# Patient Record
Sex: MALE | Race: OTHER | HISPANIC OR LATINO
[De-identification: names, ages, dates, MRNs, and addresses within clinical notes are randomized per-mention and may not be internally consistent; named-entity substitution may affect disease eponyms.]

---

## 2017-11-06 ENCOUNTER — APPOINTMENT (OUTPATIENT)
Dept: ENDOCRINOLOGY | Facility: CLINIC | Age: 57
End: 2017-11-06

## 2018-04-16 ENCOUNTER — INPATIENT (INPATIENT)
Facility: HOSPITAL | Age: 58
LOS: 1 days | Discharge: HOME | DRG: 247 | End: 2018-04-18
Attending: INTERNAL MEDICINE | Admitting: INTERNAL MEDICINE
Payer: COMMERCIAL

## 2018-04-16 VITALS
RESPIRATION RATE: 18 BRPM | HEIGHT: 65 IN | OXYGEN SATURATION: 100 % | TEMPERATURE: 99 F | DIASTOLIC BLOOD PRESSURE: 70 MMHG | SYSTOLIC BLOOD PRESSURE: 166 MMHG | WEIGHT: 160.06 LBS | HEART RATE: 73 BPM

## 2018-04-16 DIAGNOSIS — E11.9 TYPE 2 DIABETES MELLITUS WITHOUT COMPLICATIONS: ICD-10-CM

## 2018-04-16 DIAGNOSIS — Z95.1 PRESENCE OF AORTOCORONARY BYPASS GRAFT: Chronic | ICD-10-CM

## 2018-04-16 DIAGNOSIS — I25.110 ATHEROSCLEROTIC HEART DISEASE OF NATIVE CORONARY ARTERY WITH UNSTABLE ANGINA PECTORIS: ICD-10-CM

## 2018-04-16 DIAGNOSIS — I20.0 UNSTABLE ANGINA: ICD-10-CM

## 2018-04-16 LAB
ALBUMIN SERPL ELPH-MCNC: 3.8 G/DL — SIGNIFICANT CHANGE UP (ref 3.3–5)
ALP SERPL-CCNC: 68 U/L — SIGNIFICANT CHANGE UP (ref 40–120)
ALT FLD-CCNC: 22 U/L — SIGNIFICANT CHANGE UP (ref 10–45)
ANION GAP SERPL CALC-SCNC: 10 MMOL/L — SIGNIFICANT CHANGE UP (ref 5–17)
APTT BLD: 26.6 SEC — LOW (ref 27.5–37.4)
AST SERPL-CCNC: 18 U/L — SIGNIFICANT CHANGE UP (ref 10–40)
BASOPHILS NFR BLD AUTO: 0.3 % — SIGNIFICANT CHANGE UP (ref 0–2)
BILIRUB SERPL-MCNC: 0.3 MG/DL — SIGNIFICANT CHANGE UP (ref 0.2–1.2)
BUN SERPL-MCNC: 16 MG/DL — SIGNIFICANT CHANGE UP (ref 7–23)
CALCIUM SERPL-MCNC: 8.6 MG/DL — SIGNIFICANT CHANGE UP (ref 8.4–10.5)
CHLORIDE SERPL-SCNC: 97 MMOL/L — SIGNIFICANT CHANGE UP (ref 96–108)
CK MB CFR SERPL CALC: 2.6 NG/ML — SIGNIFICANT CHANGE UP (ref 0–6.7)
CK MB CFR SERPL CALC: 3.2 NG/ML — SIGNIFICANT CHANGE UP (ref 0–6.7)
CK SERPL-CCNC: 157 U/L — SIGNIFICANT CHANGE UP (ref 30–200)
CK SERPL-CCNC: 181 U/L — SIGNIFICANT CHANGE UP (ref 30–200)
CO2 SERPL-SCNC: 27 MMOL/L — SIGNIFICANT CHANGE UP (ref 22–31)
CREAT SERPL-MCNC: 0.86 MG/DL — SIGNIFICANT CHANGE UP (ref 0.5–1.3)
EOSINOPHIL NFR BLD AUTO: 1.4 % — SIGNIFICANT CHANGE UP (ref 0–6)
GLUCOSE BLDC GLUCOMTR-MCNC: 217 MG/DL — HIGH (ref 70–99)
GLUCOSE BLDC GLUCOMTR-MCNC: 222 MG/DL — HIGH (ref 70–99)
GLUCOSE SERPL-MCNC: 377 MG/DL — HIGH (ref 70–99)
HCT VFR BLD CALC: 41.7 % — SIGNIFICANT CHANGE UP (ref 39–50)
HGB BLD-MCNC: 14.5 G/DL — SIGNIFICANT CHANGE UP (ref 13–17)
INR BLD: 1.01 — SIGNIFICANT CHANGE UP (ref 0.88–1.16)
LYMPHOCYTES # BLD AUTO: 15.7 % — SIGNIFICANT CHANGE UP (ref 13–44)
MCHC RBC-ENTMCNC: 29.7 PG — SIGNIFICANT CHANGE UP (ref 27–34)
MCHC RBC-ENTMCNC: 34.8 G/DL — SIGNIFICANT CHANGE UP (ref 32–36)
MCV RBC AUTO: 85.3 FL — SIGNIFICANT CHANGE UP (ref 80–100)
MONOCYTES NFR BLD AUTO: 10.2 % — SIGNIFICANT CHANGE UP (ref 2–14)
NEUTROPHILS NFR BLD AUTO: 72.4 % — SIGNIFICANT CHANGE UP (ref 43–77)
PLATELET # BLD AUTO: 232 K/UL — SIGNIFICANT CHANGE UP (ref 150–400)
POTASSIUM SERPL-MCNC: 4.5 MMOL/L — SIGNIFICANT CHANGE UP (ref 3.5–5.3)
POTASSIUM SERPL-SCNC: 4.5 MMOL/L — SIGNIFICANT CHANGE UP (ref 3.5–5.3)
PROT SERPL-MCNC: 6.6 G/DL — SIGNIFICANT CHANGE UP (ref 6–8.3)
PROTHROM AB SERPL-ACNC: 11.2 SEC — SIGNIFICANT CHANGE UP (ref 9.8–12.7)
RBC # BLD: 4.89 M/UL — SIGNIFICANT CHANGE UP (ref 4.2–5.8)
RBC # FLD: 12.3 % — SIGNIFICANT CHANGE UP (ref 10.3–16.9)
SODIUM SERPL-SCNC: 134 MMOL/L — LOW (ref 135–145)
TROPONIN T SERPL-MCNC: <0.01 NG/ML — SIGNIFICANT CHANGE UP (ref 0–0.01)
WBC # BLD: 8.8 K/UL — SIGNIFICANT CHANGE UP (ref 3.8–10.5)
WBC # FLD AUTO: 8.8 K/UL — SIGNIFICANT CHANGE UP (ref 3.8–10.5)

## 2018-04-16 PROCEDURE — 99285 EMERGENCY DEPT VISIT HI MDM: CPT | Mod: 25

## 2018-04-16 PROCEDURE — 99223 1ST HOSP IP/OBS HIGH 75: CPT | Mod: AI

## 2018-04-16 PROCEDURE — 93010 ELECTROCARDIOGRAM REPORT: CPT

## 2018-04-16 PROCEDURE — 71045 X-RAY EXAM CHEST 1 VIEW: CPT | Mod: 26

## 2018-04-16 RX ORDER — SODIUM CHLORIDE 9 MG/ML
1000 INJECTION, SOLUTION INTRAVENOUS
Qty: 0 | Refills: 0 | Status: DISCONTINUED | OUTPATIENT
Start: 2018-04-16 | End: 2018-04-18

## 2018-04-16 RX ORDER — ASPIRIN/CALCIUM CARB/MAGNESIUM 324 MG
162 TABLET ORAL ONCE
Qty: 0 | Refills: 0 | Status: COMPLETED | OUTPATIENT
Start: 2018-04-16 | End: 2018-04-16

## 2018-04-16 RX ORDER — DEXTROSE 50 % IN WATER 50 %
25 SYRINGE (ML) INTRAVENOUS ONCE
Qty: 0 | Refills: 0 | Status: DISCONTINUED | OUTPATIENT
Start: 2018-04-16 | End: 2018-04-18

## 2018-04-16 RX ORDER — INSULIN HUMAN 100 [IU]/ML
8 INJECTION, SOLUTION SUBCUTANEOUS ONCE
Qty: 0 | Refills: 0 | Status: COMPLETED | OUTPATIENT
Start: 2018-04-16 | End: 2018-04-16

## 2018-04-16 RX ORDER — NITROGLYCERIN 6.5 MG
0.4 CAPSULE, EXTENDED RELEASE ORAL
Qty: 0 | Refills: 0 | Status: DISCONTINUED | OUTPATIENT
Start: 2018-04-16 | End: 2018-04-18

## 2018-04-16 RX ORDER — NITROGLYCERIN 6.5 MG
0.4 CAPSULE, EXTENDED RELEASE ORAL ONCE
Qty: 0 | Refills: 0 | Status: COMPLETED | OUTPATIENT
Start: 2018-04-16 | End: 2018-04-16

## 2018-04-16 RX ORDER — ASPIRIN/CALCIUM CARB/MAGNESIUM 324 MG
81 TABLET ORAL DAILY
Qty: 0 | Refills: 0 | Status: DISCONTINUED | OUTPATIENT
Start: 2018-04-18 | End: 2018-04-18

## 2018-04-16 RX ORDER — CLOPIDOGREL BISULFATE 75 MG/1
0 TABLET, FILM COATED ORAL
Qty: 0 | Refills: 0 | COMMUNITY

## 2018-04-16 RX ORDER — ASPIRIN/CALCIUM CARB/MAGNESIUM 324 MG
325 TABLET ORAL ONCE
Qty: 0 | Refills: 0 | Status: COMPLETED | OUTPATIENT
Start: 2018-04-17 | End: 2018-04-17

## 2018-04-16 RX ORDER — DIPHENHYDRAMINE HCL 50 MG
50 CAPSULE ORAL ONCE
Qty: 0 | Refills: 0 | Status: COMPLETED | OUTPATIENT
Start: 2018-04-17 | End: 2018-04-17

## 2018-04-16 RX ORDER — ATORVASTATIN CALCIUM 80 MG/1
80 TABLET, FILM COATED ORAL AT BEDTIME
Qty: 0 | Refills: 0 | Status: DISCONTINUED | OUTPATIENT
Start: 2018-04-16 | End: 2018-04-18

## 2018-04-16 RX ORDER — LOSARTAN POTASSIUM 100 MG/1
0 TABLET, FILM COATED ORAL
Qty: 0 | Refills: 0 | COMMUNITY

## 2018-04-16 RX ORDER — NITROGLYCERIN 6.5 MG
0.3 CAPSULE, EXTENDED RELEASE ORAL ONCE
Qty: 0 | Refills: 0 | Status: DISCONTINUED | OUTPATIENT
Start: 2018-04-16 | End: 2018-04-16

## 2018-04-16 RX ORDER — LOSARTAN POTASSIUM 100 MG/1
50 TABLET, FILM COATED ORAL DAILY
Qty: 0 | Refills: 0 | Status: DISCONTINUED | OUTPATIENT
Start: 2018-04-16 | End: 2018-04-18

## 2018-04-16 RX ORDER — METOPROLOL TARTRATE 50 MG
0 TABLET ORAL
Qty: 0 | Refills: 0 | COMMUNITY

## 2018-04-16 RX ORDER — INSULIN GLARGINE 100 [IU]/ML
25 INJECTION, SOLUTION SUBCUTANEOUS AT BEDTIME
Qty: 0 | Refills: 0 | Status: DISCONTINUED | OUTPATIENT
Start: 2018-04-16 | End: 2018-04-18

## 2018-04-16 RX ORDER — SODIUM CHLORIDE 9 MG/ML
1000 INJECTION INTRAMUSCULAR; INTRAVENOUS; SUBCUTANEOUS
Qty: 0 | Refills: 0 | Status: DISCONTINUED | OUTPATIENT
Start: 2018-04-17 | End: 2018-04-17

## 2018-04-16 RX ORDER — GLUCAGON INJECTION, SOLUTION 0.5 MG/.1ML
1 INJECTION, SOLUTION SUBCUTANEOUS ONCE
Qty: 0 | Refills: 0 | Status: DISCONTINUED | OUTPATIENT
Start: 2018-04-16 | End: 2018-04-18

## 2018-04-16 RX ORDER — DEXTROSE 50 % IN WATER 50 %
1 SYRINGE (ML) INTRAVENOUS ONCE
Qty: 0 | Refills: 0 | Status: DISCONTINUED | OUTPATIENT
Start: 2018-04-16 | End: 2018-04-18

## 2018-04-16 RX ORDER — CLOPIDOGREL BISULFATE 75 MG/1
75 TABLET, FILM COATED ORAL ONCE
Qty: 0 | Refills: 0 | Status: DISCONTINUED | OUTPATIENT
Start: 2018-04-16 | End: 2018-04-16

## 2018-04-16 RX ORDER — METOPROLOL TARTRATE 50 MG
50 TABLET ORAL
Qty: 0 | Refills: 0 | Status: DISCONTINUED | OUTPATIENT
Start: 2018-04-16 | End: 2018-04-18

## 2018-04-16 RX ORDER — AMLODIPINE BESYLATE 2.5 MG/1
5 TABLET ORAL ONCE
Qty: 0 | Refills: 0 | Status: COMPLETED | OUTPATIENT
Start: 2018-04-16 | End: 2018-04-16

## 2018-04-16 RX ORDER — INSULIN LISPRO 100/ML
VIAL (ML) SUBCUTANEOUS
Qty: 0 | Refills: 0 | Status: DISCONTINUED | OUTPATIENT
Start: 2018-04-16 | End: 2018-04-18

## 2018-04-16 RX ORDER — METOPROLOL TARTRATE 50 MG
25 TABLET ORAL ONCE
Qty: 0 | Refills: 0 | Status: DISCONTINUED | OUTPATIENT
Start: 2018-04-16 | End: 2018-04-16

## 2018-04-16 RX ORDER — CLOPIDOGREL BISULFATE 75 MG/1
75 TABLET, FILM COATED ORAL DAILY
Qty: 0 | Refills: 0 | Status: DISCONTINUED | OUTPATIENT
Start: 2018-04-16 | End: 2018-04-18

## 2018-04-16 RX ORDER — CLOPIDOGREL BISULFATE 75 MG/1
300 TABLET, FILM COATED ORAL ONCE
Qty: 0 | Refills: 0 | Status: COMPLETED | OUTPATIENT
Start: 2018-04-17 | End: 2018-04-17

## 2018-04-16 RX ORDER — SODIUM CHLORIDE 9 MG/ML
1000 INJECTION INTRAMUSCULAR; INTRAVENOUS; SUBCUTANEOUS ONCE
Qty: 0 | Refills: 0 | Status: COMPLETED | OUTPATIENT
Start: 2018-04-16 | End: 2018-04-16

## 2018-04-16 RX ORDER — ASPIRIN/CALCIUM CARB/MAGNESIUM 324 MG
162 TABLET ORAL ONCE
Qty: 0 | Refills: 0 | Status: DISCONTINUED | OUTPATIENT
Start: 2018-04-16 | End: 2018-04-16

## 2018-04-16 RX ORDER — DEXTROSE 50 % IN WATER 50 %
12.5 SYRINGE (ML) INTRAVENOUS ONCE
Qty: 0 | Refills: 0 | Status: DISCONTINUED | OUTPATIENT
Start: 2018-04-16 | End: 2018-04-18

## 2018-04-16 RX ADMIN — AMLODIPINE BESYLATE 5 MILLIGRAM(S): 2.5 TABLET ORAL at 18:50

## 2018-04-16 RX ADMIN — Medication 0.4 MILLIGRAM(S): at 10:41

## 2018-04-16 RX ADMIN — Medication 162 MILLIGRAM(S): at 10:41

## 2018-04-16 RX ADMIN — SODIUM CHLORIDE 3000 MILLILITER(S): 9 INJECTION INTRAMUSCULAR; INTRAVENOUS; SUBCUTANEOUS at 10:47

## 2018-04-16 RX ADMIN — INSULIN HUMAN 8 UNIT(S): 100 INJECTION, SOLUTION SUBCUTANEOUS at 10:54

## 2018-04-16 RX ADMIN — ATORVASTATIN CALCIUM 80 MILLIGRAM(S): 80 TABLET, FILM COATED ORAL at 22:02

## 2018-04-16 RX ADMIN — Medication 2: at 16:36

## 2018-04-16 RX ADMIN — Medication 2: at 22:05

## 2018-04-16 RX ADMIN — Medication 50 MILLIGRAM(S): at 17:09

## 2018-04-16 RX ADMIN — Medication 50 MILLIGRAM(S): at 22:03

## 2018-04-16 RX ADMIN — INSULIN GLARGINE 25 UNIT(S): 100 INJECTION, SOLUTION SUBCUTANEOUS at 22:06

## 2018-04-16 NOTE — ED ADULT NURSE NOTE - CHPI ED SYMPTOMS NEG
no back pain/no cough/no diaphoresis/no chills/no vomiting/no nausea/no syncope/no fever/no dizziness

## 2018-04-16 NOTE — H&P ADULT - PROBLEM SELECTOR PLAN 2
type II  resume home lantus at lower dose 25units sq qhs  hold metformin  F/UP Hg A1C in am   SS ordered

## 2018-04-16 NOTE — H&P ADULT - HISTORY OF PRESENT ILLNESS
57 y/o man  with IV CONTRAST DYE ALLERGY (unknown reaction), ex-smoker (quit in 2008, 20 pack years), questionable medical compliance, PMH  HTN, DM II, CAD s/p 3vCABG at [facility] in 2008 (LIMA-LAD, SVG-OM, SVG-PDA), who presented to [facility] ED 4/16/18 with intermittent 6/10 L sided chest discomfort for 3 days (CCS IV).  IN ED  with 6/10 CP resolved with SL NTG and ASA,  BP  160/80, HR 70's, RR 18, O2 sat 98% RA, aferbrile, troponin negative x 1. 57 y/o man  with IV CONTRAST DYE ALLERGY (hives), ex-smoker (quit in 2008, 20 pack years), questionable medical compliance, PMH  HTN, DM II, CAD s/p 3vCABG at [facility] in 2008 (LIMA-LAD, SVG-OM, SVG-PDA), who presented to [facility] ED 4/16/18 with intermittent 6/10 L sided chest discomfort for 3 days (CCS IV).  IN ED  with 6/10 CP resolved with SL NTG and ASA,  BP  160/80, HR 70's, RR 18, O2 sat 98% RA, aferbrile, troponin negative x 1.    Pt is now admitted to 5U telemetry, serial troponins with plans for a cardiac cath with possible PTCA/stent 4/17 (premedication with oral prednisone x 3 doses ordered).    Patient has not seen a cardiologist for 2 years.  Last stress test reports about 2 years ago, was not recommended for cath.  Pt stopped taking asa for about 2 years (no particular reason, denies allergy of bleed) but takes daily plavix prescribed by his PMD.  As per patient, has good exercise tolerance  and has been CP free since CABG in 2008 until 3 days ago when he developed 6/10 L sided chest discomfort without radiation and without associated symptoms.  Discomfort last 1-2 min,  self limited but recurs 5-6 times a day while at rest.  Patient did not try NTG at home but with NTG in ED CP resolved. 57 y/o man  with IV CONTRAST DYE ALLERGY (hives), ex-smoker (quit in 2008, 20 pack years), questionable medical compliance, PMH  HTN, DM II, CAD s/p 3vCABG at [facility] in 2008 (LIMA-LAD, SVG-OM, SVG-PDA), who presented to [facility] ED 4/16/18 with intermittent 6/10 L sided chest discomfort for 3 days (CCS IV).  IN ED  with 6/10 CP resolved with SL NTG and ASA 162mg.,  BP  160/80, HR 70's, RR 18, O2 sat 98% RA, afebrile, troponin negative x 1.  FS  377 in ED, s/p 1 L bolus NS.  Pt is now admitted to 5U telemetry, serial troponins with plans for a cardiac cath with possible PTCA/stent 4/17 (premedication with oral prednisone x 3 doses ordered).    Patient has not seen a cardiologist for 2 years.  Last stress test reports about 2 years ago, was not recommended for cath.  Pt stopped taking asa for about 2 years (no particular reason, denies allergy of bleed) but takes daily plavix prescribed by his PMD.  As per patient, has good exercise tolerance  and has been CP free since CABG in 2008 until 3 days ago when he developed 6/10 L sided chest discomfort without radiation and without associated symptoms.  Discomfort last 1-2 min,  self limited but recurs 5-6 times a day while at rest.  Patient did not try NTG at home but with NTG in ED CP resolved. 59 y/o man  with IV CONTRAST DYE ALLERGY (hives), ex-smoker (quit in 2008, 20 pack years), questionable medical compliance, PMH  HTN, DM II, CAD s/p 3vCABG at [facility] in 2008 (LIMA-LAD, SVG-OM, SVG-PDA), who presented to [facility] ED 4/16/18 with intermittent 6/10 L sided chest discomfort for 3 days (CCS IV).  IN ED  with 6/10 CP resolved with SL NTG and ASA 162mg.,  BP  160/80, HR 70's, RR 18, O2 sat 98% RA, afebrile, troponin negative x 1, EKG NSR with no acute ST changes.  FS  377 in ED, s/p 1 L bolus NS.  Pt is now admitted to 5U telemetry, serial troponins with plans for a cardiac cath with possible PTCA/stent 4/17 (premedication with oral prednisone x 3 doses ordered).    Patient has not seen a cardiologist for 2 years.  Last stress test reports about 2 years ago, was not recommended for cath.  Pt stopped taking asa for about 2 years (no particular reason, denies allergy of bleed) but takes daily plavix prescribed by his PMD.  As per patient, has good exercise tolerance  and has been CP free since CABG in 2008 until 3 days ago when he developed 6/10 L sided chest discomfort without radiation and without associated symptoms.  Discomfort last 1-2 min,  self limited but recurs 5-6 times a day while at rest.  Patient did not try NTG at home but with NTG in ED CP resolved.

## 2018-04-16 NOTE — H&P ADULT - NSHPLABSRESULTS_GEN_ALL_CORE
14.5   8.8   )-----------( 232      ( 16 Apr 2018 09:59 )             41.7   04-16    134<L>  |  97  |  16  ----------------------------<  377<H>  4.5   |  27  |  0.86    Ca    8.6      16 Apr 2018 09:59    TPro  6.6  /  Alb  3.8  /  TBili  0.3  /  DBili  x   /  AST  18  /  ALT  22  /  AlkPhos  68  04-16  troponin neg x 1

## 2018-04-16 NOTE — ED ADULT NURSE NOTE - OBJECTIVE STATEMENT
Pt is a 58y male complaining of chest pain 4/10 since Saturday. Pt states that he has been having left pectoral chest pain on and off again since Saturday. Pt states that he will having a pinching, shooting pain that will occur and then go away. Pt states that the pain was a 6/10 at first.  Pt states that he took Tylenol last night and that it helped but when he took it today it did not help. Pt states that he sometimes has sob but not all the time. Pt is able to speak in full sentences. Pt received triple bypass in 2008. Pt denies nausea, vomiting, fever, chills, dizziness. Ekg done. Pt placed on cardiac monitor. Will continue to monitor.

## 2018-04-16 NOTE — ED PROVIDER NOTE - PMH
Coronary artery disease with angina pectoris, unspecified vessel or lesion type, unspecified whether native or transplanted heart    Diabetes    HTN (hypertension)

## 2018-04-16 NOTE — H&P ADULT - ASSESSMENT
57 y/o man  with IV CONTRAST DYE ALLERGY (hives), ex-smoker (quit in 2008, 20 pack years), questionable medical compliance, PMH  HTN, DM II, CAD s/p 3vCABG at [facility] in 2008 (LIMA-LAD, SVG-OM, SVG-PDA), who presented to [facility] ED 4/16/18 with intermittent 6/10 L sided chest discomfort for 3 days (CCS IV).  IN ED  with 6/10 CP resolved with SL NTG and ASA,  BP  160/80, HR 70's, RR 18, O2 sat 98% RA, aferbrile, troponin negative x 1.    Pt is now admitted to 5U telemetry, serial troponins with plans for a cardiac cath with possible PTCA/stent 4/17 (premedication with oral prednisone x 3 doses ordered). 59 y/o man  with IV CONTRAST DYE ALLERGY (hives), ex-smoker (quit in 2008, 20 pack years), questionable medical compliance, PMH  HTN, DM II, CAD s/p 3vCABG at [facility] in 2008 (LIMA-LAD, SVG-OM, SVG-PDA), who presented to [facility] ED 4/16/18 with intermittent 6/10 L sided chest discomfort for 3 days (CCS IV).  IN ED  with 6/10 CP resolved with SL NTG and ASA 162mg.,  BP  160/80, HR 70's, RR 18, O2 sat 98% RA, afebrile, troponin negative x 1.  Patient is now admitted to 5U telemetry with plans for cardiac cath 4/17.

## 2018-04-16 NOTE — ED PROVIDER NOTE - MEDICAL DECISION MAKING DETAILS
SS noted above concerning for unstable angina.  DO not suspect PE or dissection given context. Plan labs, cxr, ekg, monitor, asa, nitro and discuss with cardiology.

## 2018-04-16 NOTE — H&P ADULT - PROBLEM SELECTOR PLAN 1
in patient with hx CABG   HD stable with resolved CP after SL NTG, no ischemic EKG changes, first troponin negative  PLAN for cardiac cath with Dr Day 4/17, oral prednisone 50mg x 3 doses as per protocol and IV benadryl are ordered given hx IV contrast dye allergy  continue asa, plavix, home dose metoprolol and losartan, NTG PRN  Consider IV heparin if  recurrent CP with EKG changes or + troponin in patient with hx CABG   HD stable with resolved CP after SL NTG, no ischemic EKG changes, first troponin negative  PLAN for cardiac cath with Dr Day 4/17, oral prednisone 50mg x 3 doses as per protocol and IV benadryl are ordered given hx IV contrast dye allergy  continue asa, plavix, home dose metoprolol and losartan, NTG PRN, add statin  Consider IV heparin if  recurrent CP with EKG changes or + troponin    ASA III Mallampati III  Risks & benefits of procedure and alternative therapy have been explained to the patient including but not limited to: allergic reaction, bleeding w/possible need for blood transfusion, infection, renal and vascular compromise, limb damage, arrhythmia, stroke, vessel dissection/perforation, Myocardial infarction, emergent CABG. Informed consent obtained and in chart

## 2018-04-16 NOTE — H&P ADULT - NSHPPHYSICALEXAM_GEN_ALL_CORE
comfortable   denies CP at present  NECK: no JVD or bruits   Chest : CTA b/l  Cor S1 S2 RRR no murmurs  Abd: soft, ND/NT  Ext: well perfused, no edema  R wrist with well healed surgical scar 122/60 HR 66 RR 18 O2 sat 98% RA  appears comfortable   denies CP at present  NECK: no JVD or bruits   Chest : CTA b/l  Cor S1 S2 RRR no murmurs  Abd: soft, ND/NT  Ext: well perfused, no edema  R wrist with well healed surgical scar

## 2018-04-17 ENCOUNTER — TRANSCRIPTION ENCOUNTER (OUTPATIENT)
Age: 58
End: 2018-04-17

## 2018-04-17 DIAGNOSIS — I10 ESSENTIAL (PRIMARY) HYPERTENSION: ICD-10-CM

## 2018-04-17 LAB
ALBUMIN SERPL ELPH-MCNC: 3.6 G/DL — SIGNIFICANT CHANGE UP (ref 3.3–5)
ALP SERPL-CCNC: 67 U/L — SIGNIFICANT CHANGE UP (ref 40–120)
ALT FLD-CCNC: 22 U/L — SIGNIFICANT CHANGE UP (ref 10–45)
ANION GAP SERPL CALC-SCNC: 10 MMOL/L — SIGNIFICANT CHANGE UP (ref 5–17)
AST SERPL-CCNC: 16 U/L — SIGNIFICANT CHANGE UP (ref 10–40)
BILIRUB DIRECT SERPL-MCNC: <0.2 MG/DL — SIGNIFICANT CHANGE UP (ref 0–0.2)
BILIRUB INDIRECT FLD-MCNC: >0.2 MG/DL — SIGNIFICANT CHANGE UP (ref 0.2–1)
BILIRUB SERPL-MCNC: 0.4 MG/DL — SIGNIFICANT CHANGE UP (ref 0.2–1.2)
BUN SERPL-MCNC: 14 MG/DL — SIGNIFICANT CHANGE UP (ref 7–23)
CALCIUM SERPL-MCNC: 9.1 MG/DL — SIGNIFICANT CHANGE UP (ref 8.4–10.5)
CHLORIDE SERPL-SCNC: 100 MMOL/L — SIGNIFICANT CHANGE UP (ref 96–108)
CHOLEST SERPL-MCNC: 182 MG/DL — SIGNIFICANT CHANGE UP (ref 10–199)
CK SERPL-CCNC: 158 U/L — SIGNIFICANT CHANGE UP (ref 30–200)
CO2 SERPL-SCNC: 26 MMOL/L — SIGNIFICANT CHANGE UP (ref 22–31)
CREAT SERPL-MCNC: 0.86 MG/DL — SIGNIFICANT CHANGE UP (ref 0.5–1.3)
CRP SERPL-MCNC: 0.06 MG/DL — SIGNIFICANT CHANGE UP (ref 0–0.4)
GLUCOSE BLDC GLUCOMTR-MCNC: 184 MG/DL — HIGH (ref 70–99)
GLUCOSE BLDC GLUCOMTR-MCNC: 198 MG/DL — HIGH (ref 70–99)
GLUCOSE BLDC GLUCOMTR-MCNC: 206 MG/DL — HIGH (ref 70–99)
GLUCOSE BLDC GLUCOMTR-MCNC: 238 MG/DL — HIGH (ref 70–99)
GLUCOSE BLDC GLUCOMTR-MCNC: 380 MG/DL — HIGH (ref 70–99)
GLUCOSE SERPL-MCNC: 252 MG/DL — HIGH (ref 70–99)
HBA1C BLD-MCNC: 9.5 % — HIGH (ref 4–5.6)
HCT VFR BLD CALC: 45.4 % — SIGNIFICANT CHANGE UP (ref 39–50)
HDLC SERPL-MCNC: 49 MG/DL — SIGNIFICANT CHANGE UP (ref 40–125)
HGB BLD-MCNC: 15.5 G/DL — SIGNIFICANT CHANGE UP (ref 13–17)
LIPID PNL WITH DIRECT LDL SERPL: 119 MG/DL — SIGNIFICANT CHANGE UP
MAGNESIUM SERPL-MCNC: 1.8 MG/DL — SIGNIFICANT CHANGE UP (ref 1.6–2.6)
MCHC RBC-ENTMCNC: 29.1 PG — SIGNIFICANT CHANGE UP (ref 27–34)
MCHC RBC-ENTMCNC: 34.1 G/DL — SIGNIFICANT CHANGE UP (ref 32–36)
MCV RBC AUTO: 85.3 FL — SIGNIFICANT CHANGE UP (ref 80–100)
PLATELET # BLD AUTO: 239 K/UL — SIGNIFICANT CHANGE UP (ref 150–400)
POTASSIUM SERPL-MCNC: 4.6 MMOL/L — SIGNIFICANT CHANGE UP (ref 3.5–5.3)
POTASSIUM SERPL-SCNC: 4.6 MMOL/L — SIGNIFICANT CHANGE UP (ref 3.5–5.3)
PROT SERPL-MCNC: 6.5 G/DL — SIGNIFICANT CHANGE UP (ref 6–8.3)
RBC # BLD: 5.32 M/UL — SIGNIFICANT CHANGE UP (ref 4.2–5.8)
RBC # FLD: 12.3 % — SIGNIFICANT CHANGE UP (ref 10.3–16.9)
SODIUM SERPL-SCNC: 136 MMOL/L — SIGNIFICANT CHANGE UP (ref 135–145)
TOTAL CHOLESTEROL/HDL RATIO MEASUREMENT: 3.7 RATIO — SIGNIFICANT CHANGE UP (ref 3.4–9.6)
TRIGL SERPL-MCNC: 69 MG/DL — SIGNIFICANT CHANGE UP (ref 10–149)
TROPONIN T SERPL-MCNC: <0.01 NG/ML — SIGNIFICANT CHANGE UP (ref 0–0.01)
WBC # BLD: 9.2 K/UL — SIGNIFICANT CHANGE UP (ref 3.8–10.5)
WBC # FLD AUTO: 9.2 K/UL — SIGNIFICANT CHANGE UP (ref 3.8–10.5)

## 2018-04-17 PROCEDURE — 93459 L HRT ART/GRFT ANGIO: CPT | Mod: 26,XU

## 2018-04-17 PROCEDURE — 92928 PRQ TCAT PLMT NTRAC ST 1 LES: CPT | Mod: LM,XS

## 2018-04-17 PROCEDURE — 99233 SBSQ HOSP IP/OBS HIGH 50: CPT

## 2018-04-17 RX ORDER — CLOPIDOGREL BISULFATE 75 MG/1
300 TABLET, FILM COATED ORAL ONCE
Qty: 0 | Refills: 0 | Status: DISCONTINUED | OUTPATIENT
Start: 2018-04-17 | End: 2018-04-17

## 2018-04-17 RX ORDER — SODIUM CHLORIDE 9 MG/ML
1000 INJECTION INTRAMUSCULAR; INTRAVENOUS; SUBCUTANEOUS
Qty: 0 | Refills: 0 | Status: DISCONTINUED | OUTPATIENT
Start: 2018-04-17 | End: 2018-04-18

## 2018-04-17 RX ORDER — HYDRALAZINE HCL 50 MG
25 TABLET ORAL ONCE
Qty: 0 | Refills: 0 | Status: COMPLETED | OUTPATIENT
Start: 2018-04-17 | End: 2018-04-17

## 2018-04-17 RX ADMIN — Medication 50 MILLIGRAM(S): at 18:07

## 2018-04-17 RX ADMIN — CLOPIDOGREL BISULFATE 300 MILLIGRAM(S): 75 TABLET, FILM COATED ORAL at 06:21

## 2018-04-17 RX ADMIN — Medication 2: at 06:26

## 2018-04-17 RX ADMIN — Medication 50 MILLIGRAM(S): at 06:22

## 2018-04-17 RX ADMIN — Medication 25 MILLIGRAM(S): at 00:33

## 2018-04-17 RX ADMIN — Medication 50 MILLIGRAM(S): at 13:59

## 2018-04-17 RX ADMIN — Medication 5: at 22:04

## 2018-04-17 RX ADMIN — ATORVASTATIN CALCIUM 80 MILLIGRAM(S): 80 TABLET, FILM COATED ORAL at 22:05

## 2018-04-17 RX ADMIN — Medication 325 MILLIGRAM(S): at 06:20

## 2018-04-17 RX ADMIN — Medication 1: at 18:06

## 2018-04-17 RX ADMIN — SODIUM CHLORIDE 75 MILLILITER(S): 9 INJECTION INTRAMUSCULAR; INTRAVENOUS; SUBCUTANEOUS at 06:23

## 2018-04-17 RX ADMIN — INSULIN GLARGINE 25 UNIT(S): 100 INJECTION, SOLUTION SUBCUTANEOUS at 22:05

## 2018-04-17 RX ADMIN — Medication 2: at 11:50

## 2018-04-17 RX ADMIN — LOSARTAN POTASSIUM 50 MILLIGRAM(S): 100 TABLET, FILM COATED ORAL at 06:22

## 2018-04-17 NOTE — DISCHARGE NOTE ADULT - MEDICATION SUMMARY - MEDICATIONS TO TAKE
I will START or STAY ON the medications listed below when I get home from the hospital:    aspirin 81 mg oral delayed release tablet  -- 1 tab(s) by mouth once a day  -- Indication: For CAD, helps keep your stent open    losartan  -- 50 milligram(s) by mouth once a day  -- Indication: For Hypertension    Lantus 100 units/mL subcutaneous solution  -- 35 unit(s) subcutaneous once a day (at bedtime)  -- Indication: For Diabetes    atorvastatin 80 mg oral tablet  -- 1 tab(s) by mouth once a day (at bedtime)  -- Indication: For Hyperlipidemia, CAD, helps keep your stent open     clopidogrel 75 mg oral tablet  -- 1 tab(s) by mouth once a day  -- Indication: For CAD, helps keep your stent open     metoprolol  -- 50 milligram(s) by mouth 2 times a day  -- Indication: For Hypertension

## 2018-04-17 NOTE — DISCHARGE NOTE ADULT - PRINCIPAL DIAGNOSIS
Coronary artery disease with angina pectoris, unspecified vessel or lesion type, unspecified whether native or transplanted heart

## 2018-04-17 NOTE — PROGRESS NOTE ADULT - SUBJECTIVE AND OBJECTIVE BOX
Interventional Cardiology PA Adult Progress Note    Subjective Assessment: Pt seen and examined at bedside. Pt without any complaints. Pt denies CP, SOB, N/V, dizziness, LE edema.  12 point review of systems otherwise negative except for subjective HPI.   	  MEDICATIONS:  losartan 50 milliGRAM(s) Oral daily  metoprolol tartrate 50 milliGRAM(s) Oral two times a day  nitroglycerin     SubLingual 0.4 milliGRAM(s) SubLingual every 5 minutes PRN      diphenhydrAMINE   Injectable 50 milliGRAM(s) IV Push once        atorvastatin 80 milliGRAM(s) Oral at bedtime  dextrose 50% Injectable 12.5 Gram(s) IV Push once  dextrose 50% Injectable 25 Gram(s) IV Push once  dextrose 50% Injectable 25 Gram(s) IV Push once  dextrose Gel 1 Dose(s) Oral once PRN  glucagon  Injectable 1 milliGRAM(s) IntraMuscular once PRN  insulin glargine Injectable (LANTUS) 25 Unit(s) SubCutaneous at bedtime  insulin lispro (HumaLOG) corrective regimen sliding scale   SubCutaneous Before meals and at bedtime  predniSONE   Tablet 50 milliGRAM(s) Oral once    clopidogrel Tablet 75 milliGRAM(s) Oral daily  dextrose 5%. 1000 milliLiter(s) IV Continuous <Continuous>  sodium chloride 0.9%. 1000 milliLiter(s) IV Continuous <Continuous>      	    [PHYSICAL EXAM:  TELEMETRY:  T(C): 36.1 (04-17-18 @ 10:15), Max: 37.2 (04-16-18 @ 21:57)  HR: 58 (04-17-18 @ 09:32) (57 - 74)  BP: 142/67 (04-17-18 @ 09:32) (127/69 - 191/85)  RR: 16 (04-17-18 @ 09:32) (16 - 18)  SpO2: 97% (04-17-18 @ 09:32) (95% - 100%)  Wt(kg): --  I&O's Summary    17 Apr 2018 07:01  -  17 Apr 2018 11:58  --------------------------------------------------------  IN: 0 mL / OUT: 0 mL / NET: 0 mL        Deras:  Central/PICC/Mid Line:                                         Appearance: Normal	  HEENT:   Normal oral mucosa, PERRL, EOMI	  Neck: Supple, - JVD; No Carotid Bruit   Cardiovascular: Normal S1 S2, No JVD, No murmurs,   Respiratory: Lungs clear to auscultation, No Rales, Rhonchi, Wheezing	  Gastrointestinal:  Soft, Non-tender, + BS	  Extremities:  No clubbing, cyanosis or edema  Vascular: Peripheral pulses palpable 2+ bilaterally        	    ECG:  	  RADIOLOGY:   DIAGNOSTIC TESTING:  [ ] Echocardiogram:  [ ]  Catheterization:  [ ] Stress Test:    [ ] YENNI  OTHER: 	    LABS:	 	  CARDIAC MARKERS:                                  15.5   9.2   )-----------( 239      ( 17 Apr 2018 06:40 )             45.4     04-17    136  |  100  |  14  ----------------------------<  252<H>  4.6   |  26  |  0.86    Ca    9.1      17 Apr 2018 06:38  Mg     1.8     04-17    TPro  6.5  /  Alb  3.6  /  TBili  0.4  /  DBili  <0.2  /  AST  16  /  ALT  22  /  AlkPhos  67  04-17    proBNP:   Lipid Profile:   HgA1c: Hemoglobin A1C, Whole Blood: 9.5 % (04-17 @ 06:40)    TSH:   PT/INR - ( 16 Apr 2018 09:59 )   PT: 11.2 sec;   INR: 1.01          PTT - ( 16 Apr 2018 09:59 )  PTT:26.6 sec    ASSESSMENT/PLAN: 	        DVT ppx:  Dispo:

## 2018-04-17 NOTE — DISCHARGE NOTE ADULT - CARE PROVIDERS DIRECT ADDRESSES
[email or web address],DirectAddress_Unknown

## 2018-04-17 NOTE — DISCHARGE NOTE ADULT - INSTRUCTIONS
Please avoid any heavy lifting  (no more than 3 to 5 lbs) or strenuous activity for five days  If you develop any swelling, bleeding, hardening of the skin (hematoma formation), acute pain, numbness/tingling  in your leg please contact your doctor immediately or call our 24/7 line: [phone]

## 2018-04-17 NOTE — PROGRESS NOTE ADULT - PROBLEM SELECTOR PLAN 2
On admission 's, HR 70's s/p home losartan 50 mg and lopressor 50 mg BID.   -Additional Norvasc 5 mg PO x 1 dose given  -SBP currently 120's   -c/w losartan and lopressor On admission 's, HR 70's s/p home losartan 50 mg and lopressor 50 mg BID.   -Norvasc 5 mg PO x 1 dose and hydralazine 25 mg x 1 dose given  -SBP currently 120-140's    -c/w losartan and lopressor

## 2018-04-17 NOTE — PROGRESS NOTE ADULT - PROBLEM SELECTOR PLAN 1
-hx 3V CABG   -HD stable with resolved CP after SL NTG, no ischemic EKG changes, currently CP free   -Trop negative x 3   -continue asa, plavix, home dose metoprolol and losartan, NTG PRN, added atorvastatin 80 mg   - cardiac cath with Dr Day 4/17, oral prednisone 50mg x 3 doses as per protocol and IV benadryl are ordered given hx  contrast dye allergy  -Echo ordered  -NPO for cath today, consent in chart -hx 3V CABG   -HD stable with resolved CP after SL NTG, no ischemic EKG changes, currently CP free   -Trop negative x 3   -continue asa, plavix, home dose metoprolol and losartan, NTG PRN, added atorvastatin 80 mg   - cardiac cath with Dr Day 4/17, oral prednisone 50mg x 3 doses as per protocol and IV benadryl are ordered given hx  contrast dye allergy  -Echo ordered  -NPO for cath today, consent in chart  -Of note: pt has not f/u with a cardiologist in 2 years

## 2018-04-17 NOTE — DISCHARGE NOTE ADULT - CARE PLAN
Principal Discharge DX:	Coronary artery disease with angina pectoris, unspecified vessel or lesion type, unspecified whether native or transplanted heart  Goal:	Please continue to take aspirin 81 mg oral daily, plavix 75 mg oral daily, atorvastatin 80 mg oral daily, and metoprolol 50 mg oral twice daily  Assessment and plan of treatment:	You underwent a cardiac catheterization or angiogram and received a stent to one of the arteries of the heart. The procedure was done through the groin. You do not need to keep this area covered and you may shower.  Please avoid any heavy lifting  (no more than 3 to 5 lbs) or strenuous activity for five days  If you develop any swelling, bleeding, hardening of the skin (hematoma formation), acute pain, numbness/tingling  in your leg please contact your doctor immediately or call our 24/7 line: [phone]    NEVER MISS A DOSE OF ASPIRIN OR PLAVIX; IF YOU DO, YOU ARE AT RISK OF YOUR STENT CLOSING AND HAVING A HEART ATTACK. DO NOT STOP THESE TWO MEDICATIONS UNLESS INSTRUCTED TO DO SO BY YOUR CARDIOLOGIST  Secondary Diagnosis:	Diabetes  Goal:	Please HOLD metformin and RESTART on Friday  Assessment and plan of treatment:	If you are a diabetic and you take Metformin: DO NOT TAKE your Metformin for two days. This medication can interact with the contrast used during your procedure therefore we want to ensure the contrast has left your body prior to you restarting your Metformin.  Make sure you monitor your finger sticks and diet while you are not taking your Metformin!  Secondary Diagnosis:	HTN (hypertension) Principal Discharge DX:	Coronary artery disease with angina pectoris, unspecified vessel or lesion type, unspecified whether native or transplanted heart  Goal:	Please continue to take aspirin 81 mg oral daily, plavix 75 mg oral daily, atorvastatin 80 mg oral daily, and metoprolol 50 mg oral twice daily  Assessment and plan of treatment:	You underwent a cardiac catheterization or angiogram and received a stent to one of the arteries of the heart. The procedure was done through the groin. You do not need to keep this area covered and you may shower.  Please avoid any heavy lifting  (no more than 3 to 5 lbs) or strenuous activity for five days  If you develop any swelling, bleeding, hardening of the skin (hematoma formation), acute pain, numbness/tingling  in your leg please contact your doctor immediately or call our 24/7 line: [phone]    NEVER MISS A DOSE OF ASPIRIN OR PLAVIX; IF YOU DO, YOU ARE AT RISK OF YOUR STENT CLOSING AND HAVING A HEART ATTACK. DO NOT STOP THESE TWO MEDICATIONS UNLESS INSTRUCTED TO DO SO BY YOUR CARDIOLOGIST    Please follow up with your cardiologist Dr. Garcia in 1 week.  There is a remaining artery in the heart that will need to be interveined on in 5 weeks.  Secondary Diagnosis:	Diabetes  Goal:	Please HOLD metformin and RESTART on Friday. Continue to take your insulin  Assessment and plan of treatment:	If you are a diabetic and you take Metformin: DO NOT TAKE your Metformin for two days. This medication can interact with the contrast used during your procedure therefore we want to ensure the contrast has left your body prior to you restarting your Metformin.  Make sure you monitor your finger sticks and diet while you are not taking your Metformin!  Secondary Diagnosis:	HTN (hypertension)  Goal:	Please continue to take losartan 50 mg oral daily and metoprolol tartrate 50 mg oral twice daily Principal Discharge DX:	Coronary artery disease with angina pectoris, unspecified vessel or lesion type, unspecified whether native or transplanted heart  Goal:	Please continue to take aspirin 81 mg oral daily, plavix 75 mg oral daily, atorvastatin 80 mg oral daily, and metoprolol 50 mg oral twice daily  Assessment and plan of treatment:	You underwent a cardiac catheterization or angiogram and received a stent to one of the arteries of the heart. The procedure was done through the groin. You do not need to keep this area covered and you may shower.  Please avoid any heavy lifting  (no more than 3 to 5 lbs) or strenuous activity for five days  If you develop any swelling, bleeding, hardening of the skin (hematoma formation), acute pain, numbness/tingling  in your leg please contact your doctor immediately or call our 24/7 line: [phone]    NEVER MISS A DOSE OF ASPIRIN OR PLAVIX; IF YOU DO, YOU ARE AT RISK OF YOUR STENT CLOSING AND HAVING A HEART ATTACK. DO NOT STOP THESE TWO MEDICATIONS UNLESS INSTRUCTED TO DO SO BY YOUR CARDIOLOGIST    Prescriptions for aspirin 81 mg, plavix 75 mg, and atorvastatin 80 mg were sent to the [facility] [id number] [place] [address]    Please follow up with your cardiologist Dr. Garcia in 1 week.  There is a remaining artery in the heart that will need to be intervened on with Dr. Day on Monday 6/11/2018.  Secondary Diagnosis:	Diabetes  Goal:	Please HOLD metformin and RESTART on Friday 4/20/18. Continue to take your Lantus 100 units/ml 35 units once day at bedtime  Assessment and plan of treatment:	If you are a diabetic and you take Metformin: DO NOT TAKE your Metformin for two days. This medication can interact with the contrast used during your procedure therefore we want to ensure the contrast has left your body prior to you restarting your Metformin.  Make sure you monitor your finger sticks and diet while you are not taking your Metformin!    Please contact your PCP Dr. Bereket Costa concerning metformin refill and further management of your diabetes.  Secondary Diagnosis:	HTN (hypertension)  Goal:	Please continue to take losartan 50 mg oral daily and metoprolol tartrate 50 mg oral twice daily

## 2018-04-17 NOTE — PROGRESS NOTE ADULT - PROBLEM SELECTOR PLAN 3
Hg AIC 9.5  -home lantus 35 units, continued as 25 units sq qhs during hospital stay   -Metformin held  -FS/ISS

## 2018-04-17 NOTE — DISCHARGE NOTE ADULT - PLAN OF CARE
Please HOLD metformin and RESTART on Friday If you are a diabetic and you take Metformin: DO NOT TAKE your Metformin for two days. This medication can interact with the contrast used during your procedure therefore we want to ensure the contrast has left your body prior to you restarting your Metformin.  Make sure you monitor your finger sticks and diet while you are not taking your Metformin! Please continue to take aspirin 81 mg oral daily, plavix 75 mg oral daily, atorvastatin 80 mg oral daily, and metoprolol 50 mg oral twice daily You underwent a cardiac catheterization or angiogram and received a stent to one of the arteries of the heart. The procedure was done through the groin. You do not need to keep this area covered and you may shower.  Please avoid any heavy lifting  (no more than 3 to 5 lbs) or strenuous activity for five days  If you develop any swelling, bleeding, hardening of the skin (hematoma formation), acute pain, numbness/tingling  in your leg please contact your doctor immediately or call our 24/7 line: [phone]    NEVER MISS A DOSE OF ASPIRIN OR PLAVIX; IF YOU DO, YOU ARE AT RISK OF YOUR STENT CLOSING AND HAVING A HEART ATTACK. DO NOT STOP THESE TWO MEDICATIONS UNLESS INSTRUCTED TO DO SO BY YOUR CARDIOLOGIST You underwent a cardiac catheterization or angiogram and received a stent to one of the arteries of the heart. The procedure was done through the groin. You do not need to keep this area covered and you may shower.  Please avoid any heavy lifting  (no more than 3 to 5 lbs) or strenuous activity for five days  If you develop any swelling, bleeding, hardening of the skin (hematoma formation), acute pain, numbness/tingling  in your leg please contact your doctor immediately or call our 24/7 line: [phone]    NEVER MISS A DOSE OF ASPIRIN OR PLAVIX; IF YOU DO, YOU ARE AT RISK OF YOUR STENT CLOSING AND HAVING A HEART ATTACK. DO NOT STOP THESE TWO MEDICATIONS UNLESS INSTRUCTED TO DO SO BY YOUR CARDIOLOGIST    Please follow up with your cardiologist Dr. Garcia in 1 week.  There is a remaining artery in the heart that will need to be interveined on in 5 weeks. Please HOLD metformin and RESTART on Friday. Continue to take your insulin Please continue to take losartan 50 mg oral daily and metoprolol tartrate 50 mg oral twice daily You underwent a cardiac catheterization or angiogram and received a stent to one of the arteries of the heart. The procedure was done through the groin. You do not need to keep this area covered and you may shower.  Please avoid any heavy lifting  (no more than 3 to 5 lbs) or strenuous activity for five days  If you develop any swelling, bleeding, hardening of the skin (hematoma formation), acute pain, numbness/tingling  in your leg please contact your doctor immediately or call our 24/7 line: [phone]    NEVER MISS A DOSE OF ASPIRIN OR PLAVIX; IF YOU DO, YOU ARE AT RISK OF YOUR STENT CLOSING AND HAVING A HEART ATTACK. DO NOT STOP THESE TWO MEDICATIONS UNLESS INSTRUCTED TO DO SO BY YOUR CARDIOLOGIST    Prescriptions for aspirin 81 mg, plavix 75 mg, and atorvastatin 80 mg were sent to the [facility] [address] [place] [address]    Please follow up with your cardiologist Dr. Garcia in 1 week.  There is a remaining artery in the heart that will need to be intervened on with Dr. Day on Monday 6/11/2018. Please HOLD metformin and RESTART on Friday 4/20/18. Continue to take your Lantus 100 units/ml 35 units once day at bedtime If you are a diabetic and you take Metformin: DO NOT TAKE your Metformin for two days. This medication can interact with the contrast used during your procedure therefore we want to ensure the contrast has left your body prior to you restarting your Metformin.  Make sure you monitor your finger sticks and diet while you are not taking your Metformin!    Please contact your PCP Dr. Bereket Costa concerning metformin refill and further management of your diabetes.

## 2018-04-17 NOTE — DISCHARGE NOTE ADULT - HOSPITAL COURSE
59 y/o man  with IV CONTRAST DYE ALLERGY (hives), ex-smoker (quit in 2008, 20 pack years), questionable medical compliance, PMH  HTN, DM II, CAD s/p 3vCABG at [facility] in 2008 (LIMA-LAD, SVG-OM, SVG-PDA), who presented to [facility] ED 4/16/18 with intermittent 6/10 L sided chest discomfort for 3 days (CCS IV).  In ED  with 6/10 CP resolved with SL NTG and ASA 162mg,  BP  160/80, HR 70's, RR 18, O2 sat 98% RA, afebrile, troponin negative x 1, EKG NSR with no acute ST changes.  FS  377 in ED, s/p 1 L bolus NS.   Patient has not seen a cardiologist for 2 years.  Last stress test reports about 2 years ago, was not recommended for cath.  Pt stopped taking asa for about 2 years (no particular reason, denies allergy or bleed) but takes daily plavix prescribed by his PMD.  As per patient, has good exercise tolerance  and has been CP free since CABG in 2008 until 3 days ago when he developed 6/10 L sided chest discomfort without radiation and without associated symptoms.  Discomfort last 1-2 min,  self limited but recurs 5-6 times a day while at rest.  Patient did not try NTG at home but with NTG in ED CP resolved.  Pt admitted to 5U telemetry for  cardiac cath with possible PTCA/stent 4/17/18 (premedication with oral prednisone x 3 doses ordered). On admission 's, HR 70's s/p taking home losartan 50 mg and lopressor 50 mg BID. Pt given hydralazine 25 mg x 1 and norvasc 5 mg x 1 with reduction of -140's. Pt remained CP free throughout hospital course. ASA 325mg and plavix 300 mg loaded, prednisone 50 mg PO x 3 doses premedication prior to cath. Pt now s/p cardiac catheterization 4/17/2018 with RAJENDRA x1 LM to OM1 100% mLAD fill via LIMA, 100% OM fills via SVG, 80% pRCA, 90% mRCA, EF unknown (f/u Echo), R groin PC, return for PCI of RCA in 5 weeks.  ON events on telemetry, Vital signs, labs checked  Pt will continue aspirin 81 mg oral daily, plavix 75 mg oral daily, atoravstatin 80 mg oral daily, metoprolol tartrate 50 mg oral daily, and losartan 50 mg oral daily.     Pt deemed stable for discharge per Dr. Garcia 57 y/o man  with IV CONTRAST DYE ALLERGY (hives), ex-smoker (quit in 2008, 20 pack years), questionable medical compliance, PMH  HTN, DM II, CAD s/p 3vCABG at [facility] in 2008 (LIMA-LAD, SVG-OM, SVG-PDA), who presented to [facility] ED 4/16/18 with intermittent 6/10 L sided chest discomfort for 3 days (CCS IV).  In ED  with 6/10 CP resolved with SL NTG and ASA 162mg,  BP  160/80, HR 70's, RR 18, O2 sat 98% RA, afebrile, troponin negative x 1, EKG NSR with no acute ST changes.  FS  377 in ED, s/p 1 L bolus NS.   Patient has not seen a cardiologist for 2 years.  Last stress test reports about 2 years ago, was not recommended for cath.  Pt stopped taking asa for about 2 years (no particular reason, denies allergy or bleed) but takes daily plavix prescribed by his PMD.  As per patient, has good exercise tolerance  and has been CP free since CABG in 2008 until 3 days ago when he developed 6/10 L sided chest discomfort without radiation and without associated symptoms.  Discomfort last 1-2 min,  self limited but recurs 5-6 times a day while at rest.  Patient did not try NTG at home but with NTG in ED CP resolved.  Pt admitted to 5U telemetry for  cardiac cath with possible PTCA/stent 4/17/18 (premedication with oral prednisone x 3 doses ordered). On admission 's, HR 70's s/p taking home losartan 50 mg and lopressor 50 mg BID. Pt given hydralazine 25 mg x 1 and norvasc 5 mg x 1 with reduction of -140's. Pt remained CP free throughout hospital course. ASA 325mg and plavix 300 mg loaded, prednisone 50 mg PO x 3 doses premedication prior to cath. Pt now s/p cardiac catheterization 4/17/2018 with RAJENDRA x1 LM to OM1 100% mLAD fill via LIMA, 100% OM fills via SVG, 80% pRCA, 90% mRCA, EF unknown (f/u Echo), R groin PC, return for PCI of RCA in 5 weeks.  No overnight events on telemetry, Vital signs, labs checked. R groin site stable, no hematoma, bleeding, distal pulses +1. Pt will continue aspirin 81 mg oral daily, plavix 75 mg oral daily, atoravstatin 80 mg oral daily, metoprolol tartrate 50 mg oral daily, and losartan 50 mg oral daily.  Pt deemed stable for discharge per Dr. Garcia and will follow up with Dr. Garcia in 1 week. 57 y/o man  with IV CONTRAST DYE ALLERGY (hives), ex-smoker (quit in 2008, 20 pack years), questionable medical compliance, PMH  HTN, DM II, CAD s/p 3vCABG at [facility] in 2008 (LIMA-LAD, SVG-OM, SVG-PDA), who presented to [facility] ED 4/16/18 with intermittent 6/10 L sided chest discomfort for 3 days (CCS IV).  In ED  with 6/10 CP resolved with SL NTG and ASA 162mg,  BP  160/80, HR 70's, RR 18, O2 sat 98% RA, afebrile, troponin negative x 1, EKG NSR with no acute ST changes.  FS  377 in ED, s/p 1 L bolus NS.   Patient has not seen a cardiologist for 2 years.  Last stress test reports about 2 years ago, was not recommended for cath.  Pt stopped taking asa for about 2 years (no particular reason, denies allergy or bleed) but takes daily plavix prescribed by his PMD.  As per patient, has good exercise tolerance  and has been CP free since CABG in 2008 until 3 days ago when he developed 6/10 L sided chest discomfort without radiation and without associated symptoms.  Discomfort last 1-2 min,  self limited but recurs 5-6 times a day while at rest.  Patient did not try NTG at home but with NTG in ED CP resolved.  Pt admitted to 5U telemetry for  cardiac cath with possible PTCA/stent 4/17/18 (premedication with oral prednisone x 3 doses ordered). On admission 's, HR 70's s/p taking home losartan 50 mg and lopressor 50 mg BID. Pt given hydralazine 25 mg x 1 and norvasc 5 mg x 1 with reduction of -140's. Pt remained CP free throughout hospital course. ASA 325mg and plavix 300 mg loaded, prednisone 50 mg PO x 3 doses premedication prior to cath. Pt now s/p cardiac catheterization 4/17/2018 with RAJENDRA x1 LM to OM1 100% mLAD fill via LIMA, 100% OM fills via SVG, 80% pRCA, 90% mRCA, EF 45-50% by ECHO, R groin PC, return for PCI of RCA in 5 weeks.  ECHO 4/18/18 with EF 45-50% and mild global hypokinesis. No overnight events on telemetry, Vital signs, labs checked. R groin site stable, no hematoma, bleeding, distal pulses +1. Pt will continue aspirin 81 mg oral daily, plavix 75 mg oral daily, atoravstatin 80 mg oral daily, metoprolol tartrate 50 mg oral daily, and losartan 50 mg oral daily. Pt instructed to follow up with PCP concerning refills of his metformin and further management of his diabetes, Hgb AIC 9.5 during admission. Stacie Ruth NP consulted and provided prevention counseling and material. Pt deemed stable for discharge per Dr. Garcia and will follow up with Dr. Garcia in 1 week and will return for staged PCI on Monday 6/11/2018.

## 2018-04-17 NOTE — DISCHARGE NOTE ADULT - PATIENT PORTAL LINK FT
You can access the [facility], offered by [facility], by registering with the following website: [email or web address]

## 2018-04-17 NOTE — PROGRESS NOTE ADULT - ASSESSMENT
59 y/o man  with IV CONTRAST DYE ALLERGY (hives), ex-smoker (quit in 2008, 20 pack years), questionable medical compliance, PMH  HTN, DM II, CAD s/p 3vCABG at [facility] in 2008 (LIMA-LAD, SVG-OM, SVG-PDA), who presented to [facility] ED 4/16/18 with intermittent 6/10 L sided chest discomfort for 3 days (CCS IV).  In ED  with 6/10 CP resolved with SL NTG and ASA 162mg.,  BP  160/80, HR 70's, RR 18, O2 sat 98% RA, afebrile, troponin negative x 3.  Patient is now admitted to 5U telemetry for cardiac cath 4/17/18 with Dr. Day.      ASA III, Mallampati III   Risks & benefits of procedure and alternative therapy have been explained to the patient including but not limited to: allergic reaction, bleeding w/possible need for blood transfusion, infection, renal and vascular compromise, limb damage, arrhythmia, stroke, vessel dissection/perforation, Myocardial infarction, emergent CABG. Informed consent obtained and in chart.

## 2018-04-17 NOTE — DISCHARGE NOTE ADULT - CARE PROVIDER_API CALL
Sharmila Garcia), Internal Medicine  [address] [id number]  Phone: [phone]  Fax: [phone]    Bereket Costa), Medicine  [address] [place] [address]  Phone: [phone]  Fax: [phone]    Ashish Costa  [facility] [place] [address]  Phone: [phone]  Fax: (       -

## 2018-04-17 NOTE — DISCHARGE NOTE ADULT - PROVIDER TOKENS
TOKEN:'[id number]:'[id number]',FREE:[LAST:[Igor],FIRST:[Ashish],PHONE:[phone]],FAX:[(   )    -],ADDRESS:[[facility] [place] [address] Ave, [place] [address]]]

## 2018-04-18 VITALS — TEMPERATURE: 96 F

## 2018-04-18 LAB
ANION GAP SERPL CALC-SCNC: 11 MMOL/L — SIGNIFICANT CHANGE UP (ref 5–17)
BUN SERPL-MCNC: 27 MG/DL — HIGH (ref 7–23)
CALCIUM SERPL-MCNC: 8.6 MG/DL — SIGNIFICANT CHANGE UP (ref 8.4–10.5)
CHLORIDE SERPL-SCNC: 101 MMOL/L — SIGNIFICANT CHANGE UP (ref 96–108)
CO2 SERPL-SCNC: 23 MMOL/L — SIGNIFICANT CHANGE UP (ref 22–31)
CREAT SERPL-MCNC: 1.04 MG/DL — SIGNIFICANT CHANGE UP (ref 0.5–1.3)
GLUCOSE BLDC GLUCOMTR-MCNC: 269 MG/DL — HIGH (ref 70–99)
GLUCOSE BLDC GLUCOMTR-MCNC: 286 MG/DL — HIGH (ref 70–99)
GLUCOSE SERPL-MCNC: 309 MG/DL — HIGH (ref 70–99)
HCT VFR BLD CALC: 41.8 % — SIGNIFICANT CHANGE UP (ref 39–50)
HGB BLD-MCNC: 14 G/DL — SIGNIFICANT CHANGE UP (ref 13–17)
MAGNESIUM SERPL-MCNC: 2.1 MG/DL — SIGNIFICANT CHANGE UP (ref 1.6–2.6)
MCHC RBC-ENTMCNC: 28.9 PG — SIGNIFICANT CHANGE UP (ref 27–34)
MCHC RBC-ENTMCNC: 33.5 G/DL — SIGNIFICANT CHANGE UP (ref 32–36)
MCV RBC AUTO: 86.2 FL — SIGNIFICANT CHANGE UP (ref 80–100)
PLATELET # BLD AUTO: 253 K/UL — SIGNIFICANT CHANGE UP (ref 150–400)
POTASSIUM SERPL-MCNC: 4.8 MMOL/L — SIGNIFICANT CHANGE UP (ref 3.5–5.3)
POTASSIUM SERPL-SCNC: 4.8 MMOL/L — SIGNIFICANT CHANGE UP (ref 3.5–5.3)
RBC # BLD: 4.85 M/UL — SIGNIFICANT CHANGE UP (ref 4.2–5.8)
RBC # FLD: 12.8 % — SIGNIFICANT CHANGE UP (ref 10.3–16.9)
SODIUM SERPL-SCNC: 135 MMOL/L — SIGNIFICANT CHANGE UP (ref 135–145)
WBC # BLD: 16.4 K/UL — HIGH (ref 3.8–10.5)
WBC # FLD AUTO: 16.4 K/UL — HIGH (ref 3.8–10.5)

## 2018-04-18 PROCEDURE — 93306 TTE W/DOPPLER COMPLETE: CPT

## 2018-04-18 PROCEDURE — 80076 HEPATIC FUNCTION PANEL: CPT

## 2018-04-18 PROCEDURE — 71045 X-RAY EXAM CHEST 1 VIEW: CPT

## 2018-04-18 PROCEDURE — 93010 ELECTROCARDIOGRAM REPORT: CPT

## 2018-04-18 PROCEDURE — 82553 CREATINE MB FRACTION: CPT

## 2018-04-18 PROCEDURE — 85025 COMPLETE CBC W/AUTO DIFF WBC: CPT

## 2018-04-18 PROCEDURE — C1874: CPT

## 2018-04-18 PROCEDURE — 96372 THER/PROPH/DIAG INJ SC/IM: CPT

## 2018-04-18 PROCEDURE — 85027 COMPLETE CBC AUTOMATED: CPT

## 2018-04-18 PROCEDURE — 80061 LIPID PANEL: CPT

## 2018-04-18 PROCEDURE — C1725: CPT

## 2018-04-18 PROCEDURE — 93306 TTE W/DOPPLER COMPLETE: CPT | Mod: 26

## 2018-04-18 PROCEDURE — 80053 COMPREHEN METABOLIC PANEL: CPT

## 2018-04-18 PROCEDURE — 99232 SBSQ HOSP IP/OBS MODERATE 35: CPT

## 2018-04-18 PROCEDURE — 93005 ELECTROCARDIOGRAM TRACING: CPT

## 2018-04-18 PROCEDURE — 80048 BASIC METABOLIC PNL TOTAL CA: CPT

## 2018-04-18 PROCEDURE — 86140 C-REACTIVE PROTEIN: CPT

## 2018-04-18 PROCEDURE — 82550 ASSAY OF CK (CPK): CPT

## 2018-04-18 PROCEDURE — 83036 HEMOGLOBIN GLYCOSYLATED A1C: CPT

## 2018-04-18 PROCEDURE — 82962 GLUCOSE BLOOD TEST: CPT

## 2018-04-18 PROCEDURE — 36415 COLL VENOUS BLD VENIPUNCTURE: CPT

## 2018-04-18 PROCEDURE — 83735 ASSAY OF MAGNESIUM: CPT

## 2018-04-18 PROCEDURE — 84484 ASSAY OF TROPONIN QUANT: CPT

## 2018-04-18 PROCEDURE — C1889: CPT

## 2018-04-18 PROCEDURE — C1894: CPT

## 2018-04-18 PROCEDURE — C1887: CPT

## 2018-04-18 PROCEDURE — 99285 EMERGENCY DEPT VISIT HI MDM: CPT | Mod: 25

## 2018-04-18 PROCEDURE — C1769: CPT

## 2018-04-18 PROCEDURE — C1760: CPT

## 2018-04-18 PROCEDURE — 85610 PROTHROMBIN TIME: CPT

## 2018-04-18 PROCEDURE — 85730 THROMBOPLASTIN TIME PARTIAL: CPT

## 2018-04-18 RX ORDER — CLOPIDOGREL BISULFATE 75 MG/1
1 TABLET, FILM COATED ORAL
Qty: 0 | Refills: 0 | COMMUNITY

## 2018-04-18 RX ORDER — CLOPIDOGREL BISULFATE 75 MG/1
1 TABLET, FILM COATED ORAL
Qty: 30 | Refills: 11 | OUTPATIENT
Start: 2018-04-18 | End: 2019-04-12

## 2018-04-18 RX ORDER — ASPIRIN/CALCIUM CARB/MAGNESIUM 324 MG
1 TABLET ORAL
Qty: 30 | Refills: 11 | OUTPATIENT
Start: 2018-04-18 | End: 2019-04-12

## 2018-04-18 RX ORDER — POTASSIUM CHLORIDE 20 MEQ
20 PACKET (EA) ORAL ONCE
Qty: 0 | Refills: 0 | Status: DISCONTINUED | OUTPATIENT
Start: 2018-04-18 | End: 2018-04-18

## 2018-04-18 RX ORDER — ATORVASTATIN CALCIUM 80 MG/1
1 TABLET, FILM COATED ORAL
Qty: 30 | Refills: 11
Start: 2018-04-18 | End: 2019-04-12

## 2018-04-18 RX ADMIN — Medication 3: at 08:08

## 2018-04-18 RX ADMIN — LOSARTAN POTASSIUM 50 MILLIGRAM(S): 100 TABLET, FILM COATED ORAL at 05:47

## 2018-04-18 RX ADMIN — Medication 50 MILLIGRAM(S): at 05:47

## 2018-04-18 RX ADMIN — Medication 81 MILLIGRAM(S): at 12:17

## 2018-04-18 RX ADMIN — CLOPIDOGREL BISULFATE 75 MILLIGRAM(S): 75 TABLET, FILM COATED ORAL at 12:15

## 2018-04-18 RX ADMIN — Medication 3: at 12:16

## 2018-04-18 NOTE — CONSULT NOTE ADULT - SUBJECTIVE AND OBJECTIVE BOX
CHIEF COMPLAINT: Chest Pain     HISTORY OF PRESENT ILLNESS:  59 y/o man  with IV CONTRAST DYE ALLERGY (hives), ex-smoker (quit in 2008, 20 pack years), questionable medical compliance, PMH  HTN, DM II, CAD s/p 3vCABG at [facility] in 2008 (LIMA-LAD, SVG-OM, SVG-PDA), who presented to [facility] ED 4/16/18 with intermittent 6/10 L sided chest discomfort for 3 days (CCS IV).  IN ED  with 6/10 CP resolved with SL NTG and ASA 162mg.,  BP  160/80, HR 70's, RR 18, O2 sat 98% RA, afebrile, troponin negative x 1, EKG NSR with no acute ST changes.  FS  377 in ED, s/p 1 L bolus NS.  Pt is now admitted to 5U telemetry, serial troponins with plans for a cardiac cath with possible PTCA/stent 4/17 (premedication with oral prednisone x 3 doses ordered).    Patient has not seen a cardiologist for 2 years.  Last stress test reports about 2 years ago, was not recommended for cath.  Pt stopped taking asa for about 2 years (no particular reason, denies allergy of bleed) but takes daily plavix prescribed by his PMD.  As per patient, has good exercise tolerance  and has been CP free since CABG in 2008 until 3 days ago when he developed 6/10 L sided chest discomfort without radiation and without associated symptoms.  Discomfort last 1-2 min,  self limited but recurs 5-6 times a day while at rest.  Patient did not try NTG at home but with NTG in ED CP resolved.  Patient was seen at the bedside to discuss prevention.     PAST MEDICAL & SURGICAL HISTORY:  Coronary artery disease with angina pectoris, unspecified vessel or lesion type, unspecified whether native or transplanted heart  Diabetes  HTN (hypertension)  S/P CABG x 3    FAMILY HISTORY:   Denies.     Allergies:   iodinated radiocontrast agents (Hives)    HOME MEDICATIONS:  · 	clopidogrel 75 mg oral tablet: 1 tab(s) orally once a day, Last Dose Taken:    · 	Lantus 100 units/mL subcutaneous solution: 35 unit(s) subcutaneous once a day (at bedtime), Last Dose Taken:    · 	metoprolol: 50 milligram(s) orally 2 times a day, Last Dose Taken:    · 	losartan: 50 milligram(s) orally once a day, Last Dose Taken:    Additionally patient reports taking   -Atorvastatin 40 mg/d  -metformin 1,000 mg/d  	    PHYSICAL EXAM:  T(C): 35.8 (04-18-18 @ 09:28), Max: 36.3 (04-17-18 @ 21:50)  T(F): 96.4 (04-18-18 @ 09:28), Max: 97.3 (04-17-18 @ 21:50)  HR: 65 (04-18-18 @ 05:46) (65 - 81)  BP: 165/70 (04-18-18 @ 05:46) (125/59 - 165/70)  RR: 18 (04-18-18 @ 05:46) (16 - 18)  SpO2: 99% (04-18-18 @ 05:46) (98% - 99%)  Height (cm): 165.1 (04-16 @ 09:35)  Weight (kg): 72.6 (04-16 @ 09:35)  BMI (kg/m2): 26.6 (04-16 @ 09:35)  	  LABS:                        14.0   16.4  )-----------( 253      ( 18 Apr 2018 06:12 )             41.8     04-18    135  |  101  |  27<H>  ----------------------------<  309<H>  4.8   |  23  |  1.04    Ca    8.6      18 Apr 2018 06:12  Mg     2.1     04-18    TPro  6.5  /  Alb  3.6  /  TBili  0.4  /  DBili  <0.2  /  AST  16  /  ALT  22  /  AlkPhos  67  04-17    Cholesterol, Serum: 182 mg/dL (04-17 @ 06:38)  HDL Cholesterol, Serum: 49 mg/dL (04-17 @ 06:38)  Triglycerides, Serum: 69 mg/dL (04-17 @ 06:38)  Direct LDL: 119 mg/dL (04-17 @ 06:38)    C-Reactive Protein, Serum: 0.06 mg/dL (04-17 @ 06:38)    Hemoglobin A1C, Whole Blood: 9.5 % (04-17-18 @ 06:40)      10 "S" ASSESSMENT PLAN: SMOKING, SITTING, SUGAR, SALT, SOME FATS, SOCIAL, SLEEP, SIGNS, AND MEDS:  Tobacco usage: Patient is an ex-smoker, 20 year pack history, he quit in 2008.   ETOH: He drinks 3-5 beers every other weekend.   Stress: He rates his stress level as "ok".   Caffeine: He has 4-5 cups of coffee per day with Splenda.   Hormone Replacement: Denies.   Sleep Disorder: His family reports that he snores loudly. He states that he sleeps well and wakes feeling rested.   Inflammatory Condition: Denies.   Activity Level: Sedentary.   Heart Failure: Denies history of CHF or current symptoms suggestive of congestion.   Myopathy with Statins: Denies.   GI/ Issues: Denies.   Migraines: Denies.   Current Diet: Breakfast) whole wheat bagel with butter or cheese and coffee. Lunch) whole wheat sandwich with chicken and salad or fast food. Dinner) fried chicken wings or baked chicken with rice and salad. Snacks) Oreos or sugar-free cookies.     ASSESSMENT/RECOMMENDATIONS: 	  Summary: 59 y/o man  with IV CONTRAST DYE ALLERGY (hives), ex-smoker (quit in 2008, 20 pack years), questionable medical compliance, [facility]  HTN, DM II, CAD s/p 3vCABG at [facility] in 2008 (LIMA-LAD, SVG-OM, SVG-PDA), who presented to [facility] ED 4/16/18 with intermittent 6/10 L sided chest discomfort for 3 days (CCS IV).  IN ED  with 6/10 CP resolved with SL NTG and ASA 162mg.,  BP  160/80, HR 70's, RR 18, O2 sat 98% RA, afebrile, troponin negative x 1, EKG NSR with no acute ST changes.  FS  377 in ED, s/p 1 L bolus NS.  Pt is now admitted to 5U telemetry, serial troponins with plans for a cardiac cath with possible PTCA/stent 4/17 (premedication with oral prednisone x 3 doses ordered).    Patient has not seen a cardiologist for 2 years.  Last stress test reports about 2 years ago, was not recommended for cath.  Pt stopped taking asa for about 2 years (no particular reason, denies allergy of bleed) but takes daily plavix prescribed by his PMD.  As per patient, has good exercise tolerance  and has been CP free since CABG in 2008 until 3 days ago when he developed 6/10 L sided chest discomfort without radiation and without associated symptoms.  Discomfort last 1-2 min,  self limited but recurs 5-6 times a day while at rest.  Patient did not try NTG at home but with NTG in ED CP resolved.  Patient was seen at the bedside to discuss prevention.     RECOMMENDATIONS:   Anti-platelet Therapy: DAPT per interventionalist recommendation.   Lipid Therapy: High dose statin therapy would likely benefit this patient. He state that he is currently on Lipitor 40. His A1C remains above goal of 70 mg/dl and is currently 119. Increasing Lipitor to 80 mg/d and adding Zetia might benefit this patient.   Beta Blocker Therapy: Continue current therapy with metoprolol per your discretion.   ACE/ARB Therapy: Continue current therapy with losartan per your discretion.   Diet: Low-sodium, low-carbohydrate, Mediterranean diet. Written instruction on the Mediterranean diet was provided. Patient states that he is unwilling to make changes to his diet or diabetes medications at this time. We discussed the risks of not treating his diabetes and the benefits of a healthy diet. Patient verbalizes understanding. Referral to endocrine and nutrition suggested.   Exercise: 30-45 minutes most days of the week once cleared to do so by their referring cardiologist.   Smoking: This patient is a non-smoker.   Stress Management: Instruction on mindfulness meditation was provided.   Sleep: A sleep study might benefit this patient.   Other: We discussed reduced alcohol consumption and limiting drinks to no more than two servings on any given day.     Thank you for the opportunity to see this patient. Please feel free to contact Prevention if there are any questions, or if you feel that your patient would benefit from continued follow-up visits with the Program.

## 2018-04-19 PROBLEM — I10 ESSENTIAL (PRIMARY) HYPERTENSION: Chronic | Status: ACTIVE | Noted: 2018-04-16

## 2018-04-19 PROBLEM — E11.9 TYPE 2 DIABETES MELLITUS WITHOUT COMPLICATIONS: Chronic | Status: ACTIVE | Noted: 2018-04-16

## 2018-04-20 DIAGNOSIS — Z95.1 PRESENCE OF AORTOCORONARY BYPASS GRAFT: ICD-10-CM

## 2018-04-20 DIAGNOSIS — E78.5 HYPERLIPIDEMIA, UNSPECIFIED: ICD-10-CM

## 2018-04-20 DIAGNOSIS — I25.10 ATHEROSCLEROTIC HEART DISEASE OF NATIVE CORONARY ARTERY WITHOUT ANGINA PECTORIS: ICD-10-CM

## 2018-04-20 DIAGNOSIS — E11.9 TYPE 2 DIABETES MELLITUS WITHOUT COMPLICATIONS: ICD-10-CM

## 2018-04-20 DIAGNOSIS — Z87.891 PERSONAL HISTORY OF NICOTINE DEPENDENCE: ICD-10-CM

## 2018-04-20 DIAGNOSIS — Z82.49 FAMILY HISTORY OF ISCHEMIC HEART DISEASE AND OTHER DISEASES OF THE CIRCULATORY SYSTEM: ICD-10-CM

## 2018-04-20 DIAGNOSIS — I10 ESSENTIAL (PRIMARY) HYPERTENSION: ICD-10-CM

## 2018-04-30 ENCOUNTER — APPOINTMENT (OUTPATIENT)
Dept: HEART AND VASCULAR | Facility: CLINIC | Age: 58
End: 2018-04-30
Payer: COMMERCIAL

## 2018-04-30 VITALS
SYSTOLIC BLOOD PRESSURE: 126 MMHG | HEIGHT: 64.76 IN | OXYGEN SATURATION: 98 % | TEMPERATURE: 98.7 F | HEART RATE: 59 BPM | BODY MASS INDEX: 25.63 KG/M2 | WEIGHT: 151.99 LBS | DIASTOLIC BLOOD PRESSURE: 64 MMHG

## 2018-04-30 DIAGNOSIS — Z82.3 FAMILY HISTORY OF STROKE: ICD-10-CM

## 2018-04-30 DIAGNOSIS — Z86.39 PERSONAL HISTORY OF OTHER ENDOCRINE, NUTRITIONAL AND METABOLIC DISEASE: ICD-10-CM

## 2018-04-30 DIAGNOSIS — Z83.3 FAMILY HISTORY OF DIABETES MELLITUS: ICD-10-CM

## 2018-04-30 DIAGNOSIS — Z87.891 PERSONAL HISTORY OF NICOTINE DEPENDENCE: ICD-10-CM

## 2018-04-30 DIAGNOSIS — Z78.9 OTHER SPECIFIED HEALTH STATUS: ICD-10-CM

## 2018-04-30 PROCEDURE — 93000 ELECTROCARDIOGRAM COMPLETE: CPT

## 2018-04-30 PROCEDURE — 99214 OFFICE O/P EST MOD 30 MIN: CPT

## 2018-04-30 PROCEDURE — 99406 BEHAV CHNG SMOKING 3-10 MIN: CPT | Mod: 25

## 2018-04-30 PROCEDURE — 99401 PREV MED CNSL INDIV APPRX 15: CPT | Mod: 25

## 2018-04-30 RX ORDER — ASPIRIN 81 MG
81 TABLET, DELAYED RELEASE (ENTERIC COATED) ORAL
Refills: 0 | Status: ACTIVE | COMMUNITY

## 2018-04-30 RX ORDER — CLOPIDOGREL BISULFATE 75 MG/1
75 TABLET, FILM COATED ORAL
Refills: 0 | Status: ACTIVE | COMMUNITY

## 2018-06-08 VITALS
WEIGHT: 151.9 LBS | TEMPERATURE: 97 F | HEIGHT: 65 IN | RESPIRATION RATE: 16 BRPM | OXYGEN SATURATION: 98 % | SYSTOLIC BLOOD PRESSURE: 153 MMHG | HEART RATE: 56 BPM | DIASTOLIC BLOOD PRESSURE: 66 MMHG

## 2018-06-08 NOTE — H&P ADULT - HISTORY OF PRESENT ILLNESS
** skeleton    59yo male, CONTRAST DYE ALLERGY (HIVES), Former smoker with PMHx of questionable medical compliance, HTN, DM II, CAD s/p 3vCABG at [facility] in 2008 (LIMA-LAD, SVG-OM, SVG-PDA) who presented to [facility] ED 4/2018 with chest pain s/p Cardiac Cath 4/17/18 receiving a RAJENDRA to LM-OM1, w/ residual pRCA 80% and mRCA 90% and recommended for staged PCI if clinically indicated.  Since procedure patient with c/o...     Cath Hx  Cardiac Cath @ [facility] 4/17/18: LM 90%, mLAD 100% fills via LIMA, OM1 95%, OM2 100% fills via SVG, pRCA 80%, mRCA 90%, LIMA to LAD patent, SVG to OM1 patent, SVG to RCA occluded, EF 55%.  s/p RAJENDRA to LM-OM1 stenosis.  Recommended for Staged PCI if clinically indicated. ** skeleton **    59yo male, CONTRAST DYE ALLERGY (HIVES), Former smoker with PMHx of HTN, DM II, CAD s/p 3vCABG at [facility] in 2008 (LIMA-LAD, SVG-OM, SVG-PDA) who presented to [facility] ED 4/2018 with chest pain s/p Cardiac Cath 4/17/18 receiving a RAJENDRA to LM-OM1, w/ residual pRCA 80% and mRCA 90% and recommended for staged PCI if clinically indicated.  Since procedure patient with c/o...     Cath Hx  Cardiac Cath @ [facility] 4/17/18: LM 90%, mLAD 100% fills via LIMA, OM1 95%, OM2 100% fills via SVG, pRCA 80%, mRCA 90%, LIMA to LAD patent, SVG to OM1 patent, SVG to RCA occluded, EF 55%.  s/p RAJENDRA to LM-OM1 stenosis.  Recommended for Staged PCI if clinically indicated. 57yo male, CONTRAST DYE ALLERGY (HIVES), Former smoker with PMHx of HTN, DM II, CAD s/p 3vCABG at [facility] in 2008 (LIMA-LAD, SVG-OM, SVG-PDA) who presented to [facility] ED 4/2018 with chest pain s/p Cardiac Cath 4/17/18 receiving a RAJENDRA to LM-OM1, w/ residual pRCA 80% and mRCA 90% and recommended for staged PCI if clinically indicated.  Since procedure patient states he still has intermittent episodes of chest pain described as "twinges"     Cath Hx  Cardiac Cath @ [facility] 4/17/18: LM 90%, mLAD 100% fills via LIMA, OM1 95%, OM2 100% fills via SVG, pRCA 80%, mRCA 90%, LIMA to LAD patent, SVG to OM1 patent, SVG to RCA occluded, EF 55%.  s/p RAJENDRA to LM-OM1 stenosis.  Recommended for Staged PCI if clinically indicated. 57yo male, CONTRAST DYE ALLERGY (HIVES), Former smoker with PMHx of HTN, DM II, CAD s/p 3vCABG at [facility] in 2008 (LIMA-LAD, SVG-OM, SVG-PDA) who presented to [facility] ED 4/2018 with chest pain s/p Cardiac Cath 4/17/18 receiving a RAJENDRA to LM-OM1, w/ residual pRCA 80% and mRCA 90% and recommended for staged PCI if clinically indicated.  Since procedure patient states he still has intermittent episodes of chest pain described as "twinges" that is worsened with sneezing. Pt denies FALK, palpitations, syncope, PND/orthopnea or LE edema.    Cath Hx  Cardiac Cath @ [facility] 4/17/18: LM 90%, mLAD 100% fills via LIMA, OM1 95%, OM2 100% fills via SVG, pRCA 80%, mRCA 90%, LIMA to LAD patent, SVG to OM1 patent, SVG to RCA occluded, EF 55%.  s/p RAJENDRA to LM-OM1 stenosis.  Recommended for Staged PCI if clinically indicated.      In light of pts risk factors known CAD and CCS III anginal symptoms pt returns for staged PCI

## 2018-06-08 NOTE — H&P ADULT - ATTENDING COMMENTS
Assessment: Patient personally seen and examined myself during rounds with the Physician Assistant/House Staff/Nurse Practitioner     Physician Assistant/House Staff/Nurse Practitioner note read, including vitals, physical findings, laboratory data, and radiological reports.   Revisions included below.   Direct personal management at bed side and extensive interpretation of the data.    Plan was outlined and discussed in details with the Physician Assistant/House Staff/Nurse Practitioner.    Decision making of high complexity   Risk high of complications, morbidity, and/or mortality  Assessment and Action taken for acute disease activity to reflect the level of care provided:  -Hemodynamic evaluation and support  -Medication reconciliation  -Review laboratory data  -EKG reviewed   -Echo reviewed   -ACS assessment and treatment as applicable  -Heart failure assessment and treatment as applicable  -Cardiac Telemetry reviewed  -Interdisciplinary discussion with IC / EP / HF / CTS teams as needed  TIME SPENT in evaluation and management, reassessments, review and interpretation of labs and x-rays, and hemodynamic management, formulating a plan and coordinating care: ___50____ MIN.  Time does not include procedural time.

## 2018-06-08 NOTE — H&P ADULT - NSHPSOCIALHISTORY_GEN_ALL_CORE
ex-smoker, quit after CABg 2008, 20 pack years  drinks 7 beers on weekends  denies ilicits ex-smoker, quit after CABg 2008, x25 years  drinks 7 beers on weekends  denies ilicits

## 2018-06-11 ENCOUNTER — INPATIENT (INPATIENT)
Facility: HOSPITAL | Age: 58
LOS: 0 days | Discharge: HOME | DRG: 247 | End: 2018-06-12
Attending: INTERNAL MEDICINE | Admitting: INTERNAL MEDICINE
Payer: COMMERCIAL

## 2018-06-11 DIAGNOSIS — Z95.1 PRESENCE OF AORTOCORONARY BYPASS GRAFT: Chronic | ICD-10-CM

## 2018-06-11 LAB
ALBUMIN SERPL ELPH-MCNC: 4.2 G/DL — SIGNIFICANT CHANGE UP (ref 3.3–5)
ALP SERPL-CCNC: 83 U/L — SIGNIFICANT CHANGE UP (ref 40–120)
ALT FLD-CCNC: 23 U/L — SIGNIFICANT CHANGE UP (ref 10–45)
ANION GAP SERPL CALC-SCNC: 10 MMOL/L — SIGNIFICANT CHANGE UP (ref 5–17)
APTT BLD: 29.5 SEC — SIGNIFICANT CHANGE UP (ref 27.5–37.4)
AST SERPL-CCNC: 17 U/L — SIGNIFICANT CHANGE UP (ref 10–40)
BASOPHILS NFR BLD AUTO: 0.6 % — SIGNIFICANT CHANGE UP (ref 0–2)
BILIRUB SERPL-MCNC: 0.3 MG/DL — SIGNIFICANT CHANGE UP (ref 0.2–1.2)
BUN SERPL-MCNC: 17 MG/DL — SIGNIFICANT CHANGE UP (ref 7–23)
CALCIUM SERPL-MCNC: 9.6 MG/DL — SIGNIFICANT CHANGE UP (ref 8.4–10.5)
CHLORIDE SERPL-SCNC: 100 MMOL/L — SIGNIFICANT CHANGE UP (ref 96–108)
CHOLEST SERPL-MCNC: 155 MG/DL — SIGNIFICANT CHANGE UP (ref 10–199)
CK MB CFR SERPL CALC: 3.4 NG/ML — SIGNIFICANT CHANGE UP (ref 0–6.7)
CK SERPL-CCNC: 180 U/L — SIGNIFICANT CHANGE UP (ref 30–200)
CO2 SERPL-SCNC: 28 MMOL/L — SIGNIFICANT CHANGE UP (ref 22–31)
CREAT SERPL-MCNC: 0.85 MG/DL — SIGNIFICANT CHANGE UP (ref 0.5–1.3)
CRP SERPL-MCNC: 0.09 MG/DL — SIGNIFICANT CHANGE UP (ref 0–0.4)
EOSINOPHIL NFR BLD AUTO: 5.3 % — SIGNIFICANT CHANGE UP (ref 0–6)
GLUCOSE SERPL-MCNC: 217 MG/DL — HIGH (ref 70–99)
HBA1C BLD-MCNC: 8.9 % — HIGH (ref 4–5.6)
HCT VFR BLD CALC: 39.7 % — SIGNIFICANT CHANGE UP (ref 39–50)
HDLC SERPL-MCNC: 38 MG/DL — LOW (ref 40–125)
HGB BLD-MCNC: 13.8 G/DL — SIGNIFICANT CHANGE UP (ref 13–17)
INR BLD: 0.98 — SIGNIFICANT CHANGE UP (ref 0.88–1.16)
LIPID PNL WITH DIRECT LDL SERPL: 78 MG/DL — SIGNIFICANT CHANGE UP
LYMPHOCYTES # BLD AUTO: 22.2 % — SIGNIFICANT CHANGE UP (ref 13–44)
MCHC RBC-ENTMCNC: 29.5 PG — SIGNIFICANT CHANGE UP (ref 27–34)
MCHC RBC-ENTMCNC: 34.8 G/DL — SIGNIFICANT CHANGE UP (ref 32–36)
MCV RBC AUTO: 84.8 FL — SIGNIFICANT CHANGE UP (ref 80–100)
MONOCYTES NFR BLD AUTO: 11 % — SIGNIFICANT CHANGE UP (ref 2–14)
NEUTROPHILS NFR BLD AUTO: 60.9 % — SIGNIFICANT CHANGE UP (ref 43–77)
PLATELET # BLD AUTO: 231 K/UL — SIGNIFICANT CHANGE UP (ref 150–400)
POTASSIUM SERPL-MCNC: 4.1 MMOL/L — SIGNIFICANT CHANGE UP (ref 3.5–5.3)
POTASSIUM SERPL-SCNC: 4.1 MMOL/L — SIGNIFICANT CHANGE UP (ref 3.5–5.3)
PROT SERPL-MCNC: 6.9 G/DL — SIGNIFICANT CHANGE UP (ref 6–8.3)
PROTHROM AB SERPL-ACNC: 10.9 SEC — SIGNIFICANT CHANGE UP (ref 9.8–12.7)
RBC # BLD: 4.68 M/UL — SIGNIFICANT CHANGE UP (ref 4.2–5.8)
RBC # FLD: 12.7 % — SIGNIFICANT CHANGE UP (ref 10.3–16.9)
SODIUM SERPL-SCNC: 138 MMOL/L — SIGNIFICANT CHANGE UP (ref 135–145)
TOTAL CHOLESTEROL/HDL RATIO MEASUREMENT: 4.1 RATIO — SIGNIFICANT CHANGE UP (ref 3.4–9.6)
TRIGL SERPL-MCNC: 194 MG/DL — HIGH (ref 10–149)
WBC # BLD: 8.5 K/UL — SIGNIFICANT CHANGE UP (ref 3.8–10.5)
WBC # FLD AUTO: 8.5 K/UL — SIGNIFICANT CHANGE UP (ref 3.8–10.5)

## 2018-06-11 PROCEDURE — 92933 PRQ TRLML C ATHRC ST ANGIOP1: CPT | Mod: RC

## 2018-06-11 PROCEDURE — 93010 ELECTROCARDIOGRAM REPORT: CPT

## 2018-06-11 PROCEDURE — 99222 1ST HOSP IP/OBS MODERATE 55: CPT

## 2018-06-11 RX ORDER — LOSARTAN POTASSIUM 100 MG/1
50 TABLET, FILM COATED ORAL DAILY
Qty: 0 | Refills: 0 | Status: DISCONTINUED | OUTPATIENT
Start: 2018-06-11 | End: 2018-06-12

## 2018-06-11 RX ORDER — CLOPIDOGREL BISULFATE 75 MG/1
75 TABLET, FILM COATED ORAL DAILY
Qty: 0 | Refills: 0 | Status: DISCONTINUED | OUTPATIENT
Start: 2018-06-11 | End: 2018-06-12

## 2018-06-11 RX ORDER — INSULIN GLARGINE 100 [IU]/ML
35 INJECTION, SOLUTION SUBCUTANEOUS AT BEDTIME
Qty: 0 | Refills: 0 | Status: DISCONTINUED | OUTPATIENT
Start: 2018-06-11 | End: 2018-06-12

## 2018-06-11 RX ORDER — INSULIN LISPRO 100/ML
VIAL (ML) SUBCUTANEOUS ONCE
Qty: 0 | Refills: 0 | Status: COMPLETED | OUTPATIENT
Start: 2018-06-11 | End: 2018-06-11

## 2018-06-11 RX ORDER — INSULIN LISPRO 100/ML
VIAL (ML) SUBCUTANEOUS AT BEDTIME
Qty: 0 | Refills: 0 | Status: DISCONTINUED | OUTPATIENT
Start: 2018-06-11 | End: 2018-06-12

## 2018-06-11 RX ORDER — ASPIRIN/CALCIUM CARB/MAGNESIUM 324 MG
81 TABLET ORAL DAILY
Qty: 0 | Refills: 0 | Status: DISCONTINUED | OUTPATIENT
Start: 2018-06-11 | End: 2018-06-12

## 2018-06-11 RX ORDER — SODIUM CHLORIDE 9 MG/ML
500 INJECTION INTRAMUSCULAR; INTRAVENOUS; SUBCUTANEOUS
Qty: 0 | Refills: 0 | Status: DISCONTINUED | OUTPATIENT
Start: 2018-06-11 | End: 2018-06-11

## 2018-06-11 RX ORDER — METOPROLOL TARTRATE 50 MG
50 TABLET ORAL
Qty: 0 | Refills: 0 | Status: DISCONTINUED | OUTPATIENT
Start: 2018-06-11 | End: 2018-06-12

## 2018-06-11 RX ORDER — HYDROCORTISONE 20 MG
200 TABLET ORAL ONCE
Qty: 0 | Refills: 0 | Status: COMPLETED | OUTPATIENT
Start: 2018-06-11 | End: 2018-06-11

## 2018-06-11 RX ORDER — INSULIN LISPRO 100/ML
VIAL (ML) SUBCUTANEOUS
Qty: 0 | Refills: 0 | Status: DISCONTINUED | OUTPATIENT
Start: 2018-06-11 | End: 2018-06-12

## 2018-06-11 RX ORDER — ATORVASTATIN CALCIUM 80 MG/1
80 TABLET, FILM COATED ORAL AT BEDTIME
Qty: 0 | Refills: 0 | Status: DISCONTINUED | OUTPATIENT
Start: 2018-06-11 | End: 2018-06-12

## 2018-06-11 RX ORDER — DIPHENHYDRAMINE HCL 50 MG
50 CAPSULE ORAL ONCE
Qty: 0 | Refills: 0 | Status: COMPLETED | OUTPATIENT
Start: 2018-06-11 | End: 2018-06-11

## 2018-06-11 RX ORDER — SODIUM CHLORIDE 9 MG/ML
500 INJECTION INTRAMUSCULAR; INTRAVENOUS; SUBCUTANEOUS
Qty: 0 | Refills: 0 | Status: DISCONTINUED | OUTPATIENT
Start: 2018-06-11 | End: 2018-06-12

## 2018-06-11 RX ADMIN — SODIUM CHLORIDE 75 MILLILITER(S): 9 INJECTION INTRAMUSCULAR; INTRAVENOUS; SUBCUTANEOUS at 16:03

## 2018-06-11 RX ADMIN — INSULIN GLARGINE 35 UNIT(S): 100 INJECTION, SOLUTION SUBCUTANEOUS at 22:36

## 2018-06-11 RX ADMIN — Medication 108 MILLIGRAM(S): at 16:05

## 2018-06-11 RX ADMIN — ATORVASTATIN CALCIUM 80 MILLIGRAM(S): 80 TABLET, FILM COATED ORAL at 21:45

## 2018-06-11 RX ADMIN — Medication 50 MILLIGRAM(S): at 21:45

## 2018-06-11 RX ADMIN — Medication 50 MILLIGRAM(S): at 16:07

## 2018-06-12 ENCOUNTER — TRANSCRIPTION ENCOUNTER (OUTPATIENT)
Age: 58
End: 2018-06-12

## 2018-06-12 VITALS — TEMPERATURE: 97 F

## 2018-06-12 LAB
ANION GAP SERPL CALC-SCNC: 9 MMOL/L — SIGNIFICANT CHANGE UP (ref 5–17)
BUN SERPL-MCNC: 20 MG/DL — SIGNIFICANT CHANGE UP (ref 7–23)
CALCIUM SERPL-MCNC: 8.9 MG/DL — SIGNIFICANT CHANGE UP (ref 8.4–10.5)
CHLORIDE SERPL-SCNC: 102 MMOL/L — SIGNIFICANT CHANGE UP (ref 96–108)
CO2 SERPL-SCNC: 28 MMOL/L — SIGNIFICANT CHANGE UP (ref 22–31)
CREAT SERPL-MCNC: 0.91 MG/DL — SIGNIFICANT CHANGE UP (ref 0.5–1.3)
GLUCOSE SERPL-MCNC: 180 MG/DL — HIGH (ref 70–99)
HCT VFR BLD CALC: 38.7 % — LOW (ref 39–50)
HGB BLD-MCNC: 13.4 G/DL — SIGNIFICANT CHANGE UP (ref 13–17)
MAGNESIUM SERPL-MCNC: 2 MG/DL — SIGNIFICANT CHANGE UP (ref 1.6–2.6)
MCHC RBC-ENTMCNC: 29.5 PG — SIGNIFICANT CHANGE UP (ref 27–34)
MCHC RBC-ENTMCNC: 34.6 G/DL — SIGNIFICANT CHANGE UP (ref 32–36)
MCV RBC AUTO: 85.2 FL — SIGNIFICANT CHANGE UP (ref 80–100)
PLATELET # BLD AUTO: 227 K/UL — SIGNIFICANT CHANGE UP (ref 150–400)
POTASSIUM SERPL-MCNC: 4.6 MMOL/L — SIGNIFICANT CHANGE UP (ref 3.5–5.3)
POTASSIUM SERPL-SCNC: 4.6 MMOL/L — SIGNIFICANT CHANGE UP (ref 3.5–5.3)
RBC # BLD: 4.54 M/UL — SIGNIFICANT CHANGE UP (ref 4.2–5.8)
RBC # FLD: 12.8 % — SIGNIFICANT CHANGE UP (ref 10.3–16.9)
SODIUM SERPL-SCNC: 139 MMOL/L — SIGNIFICANT CHANGE UP (ref 135–145)
WBC # BLD: 10.2 K/UL — SIGNIFICANT CHANGE UP (ref 3.8–10.5)
WBC # FLD AUTO: 10.2 K/UL — SIGNIFICANT CHANGE UP (ref 3.8–10.5)

## 2018-06-12 PROCEDURE — 93005 ELECTROCARDIOGRAM TRACING: CPT

## 2018-06-12 PROCEDURE — 99238 HOSP IP/OBS DSCHRG MGMT 30/<: CPT

## 2018-06-12 PROCEDURE — 36415 COLL VENOUS BLD VENIPUNCTURE: CPT

## 2018-06-12 PROCEDURE — 83735 ASSAY OF MAGNESIUM: CPT

## 2018-06-12 PROCEDURE — C1894: CPT

## 2018-06-12 PROCEDURE — C1724: CPT

## 2018-06-12 PROCEDURE — C1725: CPT

## 2018-06-12 PROCEDURE — 85610 PROTHROMBIN TIME: CPT

## 2018-06-12 PROCEDURE — 82553 CREATINE MB FRACTION: CPT

## 2018-06-12 PROCEDURE — 85025 COMPLETE CBC W/AUTO DIFF WBC: CPT

## 2018-06-12 PROCEDURE — C1769: CPT

## 2018-06-12 PROCEDURE — 83036 HEMOGLOBIN GLYCOSYLATED A1C: CPT

## 2018-06-12 PROCEDURE — 86140 C-REACTIVE PROTEIN: CPT

## 2018-06-12 PROCEDURE — C1760: CPT

## 2018-06-12 PROCEDURE — 85027 COMPLETE CBC AUTOMATED: CPT

## 2018-06-12 PROCEDURE — 82550 ASSAY OF CK (CPK): CPT

## 2018-06-12 PROCEDURE — 80048 BASIC METABOLIC PNL TOTAL CA: CPT

## 2018-06-12 PROCEDURE — C1887: CPT

## 2018-06-12 PROCEDURE — 80053 COMPREHEN METABOLIC PANEL: CPT

## 2018-06-12 PROCEDURE — 82962 GLUCOSE BLOOD TEST: CPT

## 2018-06-12 PROCEDURE — C1874: CPT

## 2018-06-12 PROCEDURE — 80061 LIPID PANEL: CPT

## 2018-06-12 PROCEDURE — C1889: CPT

## 2018-06-12 PROCEDURE — 85730 THROMBOPLASTIN TIME PARTIAL: CPT

## 2018-06-12 RX ORDER — LOSARTAN POTASSIUM 100 MG/1
1 TABLET, FILM COATED ORAL
Qty: 30 | Refills: 2
Start: 2018-06-12 | End: 2018-09-09

## 2018-06-12 RX ORDER — METFORMIN HYDROCHLORIDE 850 MG/1
1 TABLET ORAL
Qty: 0 | Refills: 0 | COMMUNITY

## 2018-06-12 RX ORDER — ASPIRIN/CALCIUM CARB/MAGNESIUM 324 MG
1 TABLET ORAL
Qty: 30 | Refills: 11
Start: 2018-06-12 | End: 2019-06-06

## 2018-06-12 RX ORDER — LOSARTAN POTASSIUM 100 MG/1
50 TABLET, FILM COATED ORAL
Qty: 0 | Refills: 0 | COMMUNITY

## 2018-06-12 RX ORDER — CLOPIDOGREL BISULFATE 75 MG/1
1 TABLET, FILM COATED ORAL
Qty: 30 | Refills: 11
Start: 2018-06-12 | End: 2019-06-06

## 2018-06-12 RX ADMIN — Medication 81 MILLIGRAM(S): at 11:13

## 2018-06-12 RX ADMIN — CLOPIDOGREL BISULFATE 75 MILLIGRAM(S): 75 TABLET, FILM COATED ORAL at 11:12

## 2018-06-12 RX ADMIN — LOSARTAN POTASSIUM 50 MILLIGRAM(S): 100 TABLET, FILM COATED ORAL at 05:52

## 2018-06-12 RX ADMIN — Medication 50 MILLIGRAM(S): at 05:52

## 2018-06-12 RX ADMIN — Medication 1: at 07:13

## 2018-06-12 NOTE — DISCHARGE NOTE ADULT - CARE PROVIDER_API CALL
Sharmila Garcia), Internal Medicine  [address] [id number]  Phone: [phone]  Fax: [phone]

## 2018-06-12 NOTE — DISCHARGE NOTE ADULT - MEDICATION SUMMARY - MEDICATIONS TO TAKE
I will START or STAY ON the medications listed below when I get home from the hospital:    aspirin 81 mg oral delayed release tablet  -- 1 tab(s) by mouth once a day  -- Indication: For Coronary Artery Disease    Cozaar 50 mg oral tablet  -- 1 tab(s) by mouth once a day  -- Indication: For Hypertension    metFORMIN 1000 mg oral tablet  -- 1 tab(s) by mouth 2 times a day    hold until Friday June 15th, 2018  -- Indication: For Diabetes- resume Friday June 15th, 2018    Lantus 100 units/mL subcutaneous solution  -- 35 unit(s) subcutaneous once a day (at bedtime)  -- Indication: For Diabetes    atorvastatin 80 mg oral tablet  -- 1 tab(s) by mouth once a day (at bedtime)  -- Indication: For Hyperlipidemia    clopidogrel 75 mg oral tablet  -- 1 tab(s) by mouth once a day  -- Indication: For Coronary Artery Disease    metoprolol  -- 50 milligram(s) by mouth 2 times a day  -- Indication: For Hypertension

## 2018-06-12 NOTE — DISCHARGE NOTE ADULT - PLAN OF CARE
You had two new stents placed to your heart and need to continue taking Aspirin 81mg and Plavix 75mg daily and DO NOT STOP THESE MEDICATIONS UNLESS INSTRUCTED BY YOUR CARDIOLOGIST ONLY You underwent a coronary angiogram and should wait 3 days before returning to ordinary activities. Do not drive for 2 days. Consult your doctor before returning to vigorous activity. You may return to work in 3-5 days. The catheter from your groin was removed. You may shower once the dressing is removed, but avoid baths, hot tubs, or swimming for 5 days to prevent infection. If you notice bleeding from the site, hardening and pain at the site, drainage or redness from the site, coolness/paleness of the extremity, swelling, or fever, please call [phone]. Please continue your aspirin and plavix as prescribed unless otherwise indicated by your cardiologist. All of your prescriptions have been sent to the pharmacy. Please follow up with Dr. Garcia in 1-2 weeks. All of your needed prescriptions have been sent electronically to your pharmacy. Continue your Losartan 50mg daily, Metoprolol Tartrate 50mg two times a day for your blood pressure. You have a history of elevated blood pressure and you should continue your blood pressure medications as prescribed. Hold your Metformin until Friday 6/15/18 and resume your insulin as prescribed.

## 2018-06-12 NOTE — DISCHARGE NOTE ADULT - PATIENT PORTAL LINK FT
You can access the [facility], offered by [facility], by registering with the following website: [email or web address]

## 2018-06-12 NOTE — DISCHARGE NOTE ADULT - CARE PLAN
Principal Discharge DX:	Coronary artery disease with angina pectoris, unspecified vessel or lesion type, unspecified whether native or transplanted heart  Goal:	You had two new stents placed to your heart and need to continue taking Aspirin 81mg and Plavix 75mg daily and DO NOT STOP THESE MEDICATIONS UNLESS INSTRUCTED BY YOUR CARDIOLOGIST ONLY  Assessment and plan of treatment:	You underwent a coronary angiogram and should wait 3 days before returning to ordinary activities. Do not drive for 2 days. Consult your doctor before returning to vigorous activity. You may return to work in 3-5 days. The catheter from your groin was removed. You may shower once the dressing is removed, but avoid baths, hot tubs, or swimming for 5 days to prevent infection. If you notice bleeding from the site, hardening and pain at the site, drainage or redness from the site, coolness/paleness of the extremity, swelling, or fever, please call [phone]. Please continue your aspirin and plavix as prescribed unless otherwise indicated by your cardiologist. All of your prescriptions have been sent to the pharmacy. Please follow up with Dr. Garcia in 1-2 weeks. All of your needed prescriptions have been sent electronically to your pharmacy.  Secondary Diagnosis:	HTN (hypertension)  Goal:	Continue your Losartan 50mg daily, Metoprolol Tartrate 50mg two times a day for your blood pressure.  Assessment and plan of treatment:	You have a history of elevated blood pressure and you should continue your blood pressure medications as prescribed.  Secondary Diagnosis:	Diabetes  Goal:	Hold your Metformin until Friday 6/15/18 and resume your insulin as prescribed.

## 2018-06-15 DIAGNOSIS — Z95.1 PRESENCE OF AORTOCORONARY BYPASS GRAFT: ICD-10-CM

## 2018-06-15 DIAGNOSIS — I10 ESSENTIAL (PRIMARY) HYPERTENSION: ICD-10-CM

## 2018-06-15 DIAGNOSIS — E11.9 TYPE 2 DIABETES MELLITUS WITHOUT COMPLICATIONS: ICD-10-CM

## 2018-06-15 DIAGNOSIS — E78.5 HYPERLIPIDEMIA, UNSPECIFIED: ICD-10-CM

## 2018-06-15 DIAGNOSIS — Z95.5 PRESENCE OF CORONARY ANGIOPLASTY IMPLANT AND GRAFT: ICD-10-CM

## 2018-06-15 DIAGNOSIS — I25.118 ATHEROSCLEROTIC HEART DISEASE OF NATIVE CORONARY ARTERY WITH OTHER FORMS OF ANGINA PECTORIS: ICD-10-CM

## 2018-06-15 DIAGNOSIS — Z79.84 LONG TERM (CURRENT) USE OF ORAL HYPOGLYCEMIC DRUGS: ICD-10-CM

## 2018-06-15 DIAGNOSIS — Z87.891 PERSONAL HISTORY OF NICOTINE DEPENDENCE: ICD-10-CM

## 2018-06-15 DIAGNOSIS — Z91.041 RADIOGRAPHIC DYE ALLERGY STATUS: ICD-10-CM

## 2018-06-16 ENCOUNTER — INPATIENT (INPATIENT)
Facility: HOSPITAL | Age: 58
LOS: 0 days | Discharge: HOME | DRG: 254 | End: 2018-06-17
Attending: SURGERY | Admitting: SURGERY
Payer: COMMERCIAL

## 2018-06-16 ENCOUNTER — RESULT REVIEW (OUTPATIENT)
Age: 58
End: 2018-06-16

## 2018-06-16 VITALS
OXYGEN SATURATION: 97 % | RESPIRATION RATE: 20 BRPM | HEART RATE: 72 BPM | WEIGHT: 155.65 LBS | SYSTOLIC BLOOD PRESSURE: 160 MMHG | DIASTOLIC BLOOD PRESSURE: 63 MMHG | HEIGHT: 65 IN | TEMPERATURE: 98 F

## 2018-06-16 DIAGNOSIS — Z95.1 PRESENCE OF AORTOCORONARY BYPASS GRAFT: Chronic | ICD-10-CM

## 2018-06-16 LAB
ALBUMIN SERPL ELPH-MCNC: 3.6 G/DL — SIGNIFICANT CHANGE UP (ref 3.3–5)
ALP SERPL-CCNC: 83 U/L — SIGNIFICANT CHANGE UP (ref 40–120)
ALT FLD-CCNC: 27 U/L — SIGNIFICANT CHANGE UP (ref 10–45)
ANION GAP SERPL CALC-SCNC: 14 MMOL/L — SIGNIFICANT CHANGE UP (ref 5–17)
ANION GAP SERPL CALC-SCNC: 8 MMOL/L — SIGNIFICANT CHANGE UP (ref 5–17)
APTT BLD: 29.8 SEC — SIGNIFICANT CHANGE UP (ref 27.5–37.4)
AST SERPL-CCNC: 20 U/L — SIGNIFICANT CHANGE UP (ref 10–40)
BASOPHILS NFR BLD AUTO: 0.1 % — SIGNIFICANT CHANGE UP (ref 0–2)
BASOPHILS NFR BLD AUTO: 0.3 % — SIGNIFICANT CHANGE UP (ref 0–2)
BILIRUB SERPL-MCNC: 0.3 MG/DL — SIGNIFICANT CHANGE UP (ref 0.2–1.2)
BLD GP AB SCN SERPL QL: NEGATIVE — SIGNIFICANT CHANGE UP
BUN SERPL-MCNC: 20 MG/DL — SIGNIFICANT CHANGE UP (ref 7–23)
BUN SERPL-MCNC: 23 MG/DL — SIGNIFICANT CHANGE UP (ref 7–23)
CALCIUM SERPL-MCNC: 8.4 MG/DL — SIGNIFICANT CHANGE UP (ref 8.4–10.5)
CALCIUM SERPL-MCNC: 8.9 MG/DL — SIGNIFICANT CHANGE UP (ref 8.4–10.5)
CHLORIDE SERPL-SCNC: 100 MMOL/L — SIGNIFICANT CHANGE UP (ref 96–108)
CHLORIDE SERPL-SCNC: 98 MMOL/L — SIGNIFICANT CHANGE UP (ref 96–108)
CO2 SERPL-SCNC: 21 MMOL/L — LOW (ref 22–31)
CO2 SERPL-SCNC: 26 MMOL/L — SIGNIFICANT CHANGE UP (ref 22–31)
CREAT SERPL-MCNC: 0.78 MG/DL — SIGNIFICANT CHANGE UP (ref 0.5–1.3)
CREAT SERPL-MCNC: 0.99 MG/DL — SIGNIFICANT CHANGE UP (ref 0.5–1.3)
EOSINOPHIL NFR BLD AUTO: 4 % — SIGNIFICANT CHANGE UP (ref 0–6)
GLUCOSE BLDC GLUCOMTR-MCNC: 193 MG/DL — HIGH (ref 70–99)
GLUCOSE BLDC GLUCOMTR-MCNC: 216 MG/DL — HIGH (ref 70–99)
GLUCOSE BLDC GLUCOMTR-MCNC: 237 MG/DL — HIGH (ref 70–99)
GLUCOSE SERPL-MCNC: 250 MG/DL — HIGH (ref 70–99)
GLUCOSE SERPL-MCNC: 284 MG/DL — HIGH (ref 70–99)
HBA1C BLD-MCNC: 9.1 % — HIGH (ref 4–5.6)
HCT VFR BLD CALC: 38 % — LOW (ref 39–50)
HCT VFR BLD CALC: 38.4 % — LOW (ref 39–50)
HGB BLD-MCNC: 13.1 G/DL — SIGNIFICANT CHANGE UP (ref 13–17)
HGB BLD-MCNC: 13.5 G/DL — SIGNIFICANT CHANGE UP (ref 13–17)
INR BLD: 1.04 — SIGNIFICANT CHANGE UP (ref 0.88–1.16)
LYMPHOCYTES # BLD AUTO: 16.4 % — SIGNIFICANT CHANGE UP (ref 13–44)
LYMPHOCYTES # BLD AUTO: 5.6 % — LOW (ref 13–44)
MAGNESIUM SERPL-MCNC: 1.7 MG/DL — SIGNIFICANT CHANGE UP (ref 1.6–2.6)
MCHC RBC-ENTMCNC: 29.3 PG — SIGNIFICANT CHANGE UP (ref 27–34)
MCHC RBC-ENTMCNC: 29.9 PG — SIGNIFICANT CHANGE UP (ref 27–34)
MCHC RBC-ENTMCNC: 34.1 G/DL — SIGNIFICANT CHANGE UP (ref 32–36)
MCHC RBC-ENTMCNC: 35.5 G/DL — SIGNIFICANT CHANGE UP (ref 32–36)
MCV RBC AUTO: 84.3 FL — SIGNIFICANT CHANGE UP (ref 80–100)
MCV RBC AUTO: 85.9 FL — SIGNIFICANT CHANGE UP (ref 80–100)
MONOCYTES NFR BLD AUTO: 11.9 % — SIGNIFICANT CHANGE UP (ref 2–14)
MONOCYTES NFR BLD AUTO: 4.5 % — SIGNIFICANT CHANGE UP (ref 2–14)
NEUTROPHILS NFR BLD AUTO: 67.4 % — SIGNIFICANT CHANGE UP (ref 43–77)
NEUTROPHILS NFR BLD AUTO: 89.8 % — HIGH (ref 43–77)
PHOSPHATE SERPL-MCNC: 3.8 MG/DL — SIGNIFICANT CHANGE UP (ref 2.5–4.5)
PLATELET # BLD AUTO: 244 K/UL — SIGNIFICANT CHANGE UP (ref 150–400)
PLATELET # BLD AUTO: 265 K/UL — SIGNIFICANT CHANGE UP (ref 150–400)
POTASSIUM SERPL-MCNC: 4.3 MMOL/L — SIGNIFICANT CHANGE UP (ref 3.5–5.3)
POTASSIUM SERPL-MCNC: 4.9 MMOL/L — SIGNIFICANT CHANGE UP (ref 3.5–5.3)
POTASSIUM SERPL-SCNC: 4.3 MMOL/L — SIGNIFICANT CHANGE UP (ref 3.5–5.3)
POTASSIUM SERPL-SCNC: 4.9 MMOL/L — SIGNIFICANT CHANGE UP (ref 3.5–5.3)
PROT SERPL-MCNC: 6.7 G/DL — SIGNIFICANT CHANGE UP (ref 6–8.3)
PROTHROM AB SERPL-ACNC: 11.6 SEC — SIGNIFICANT CHANGE UP (ref 9.8–12.7)
RBC # BLD: 4.47 M/UL — SIGNIFICANT CHANGE UP (ref 4.2–5.8)
RBC # BLD: 4.51 M/UL — SIGNIFICANT CHANGE UP (ref 4.2–5.8)
RBC # FLD: 12.6 % — SIGNIFICANT CHANGE UP (ref 10.3–16.9)
RBC # FLD: 12.9 % — SIGNIFICANT CHANGE UP (ref 10.3–16.9)
RH IG SCN BLD-IMP: POSITIVE — SIGNIFICANT CHANGE UP
SODIUM SERPL-SCNC: 133 MMOL/L — LOW (ref 135–145)
SODIUM SERPL-SCNC: 134 MMOL/L — LOW (ref 135–145)
WBC # BLD: 14.2 K/UL — HIGH (ref 3.8–10.5)
WBC # BLD: 9.1 K/UL — SIGNIFICANT CHANGE UP (ref 3.8–10.5)
WBC # FLD AUTO: 14.2 K/UL — HIGH (ref 3.8–10.5)
WBC # FLD AUTO: 9.1 K/UL — SIGNIFICANT CHANGE UP (ref 3.8–10.5)

## 2018-06-16 PROCEDURE — 99285 EMERGENCY DEPT VISIT HI MDM: CPT

## 2018-06-16 PROCEDURE — 35371 RECHANNELING OF ARTERY: CPT | Mod: GC

## 2018-06-16 PROCEDURE — 34201 REMOVAL OF ARTERY CLOT: CPT | Mod: 59,GC

## 2018-06-16 PROCEDURE — 75710 ARTERY X-RAYS ARM/LEG: CPT | Mod: 26,59,GC

## 2018-06-16 PROCEDURE — 71275 CT ANGIOGRAPHY CHEST: CPT | Mod: 26

## 2018-06-16 PROCEDURE — 71046 X-RAY EXAM CHEST 2 VIEWS: CPT | Mod: 26

## 2018-06-16 PROCEDURE — 75635 CT ANGIO ABDOMINAL ARTERIES: CPT | Mod: 26

## 2018-06-16 RX ORDER — HEPARIN SODIUM 5000 [USP'U]/ML
INJECTION INTRAVENOUS; SUBCUTANEOUS
Qty: 25000 | Refills: 0 | Status: DISCONTINUED | OUTPATIENT
Start: 2018-06-16 | End: 2018-06-16

## 2018-06-16 RX ORDER — SODIUM CHLORIDE 9 MG/ML
1000 INJECTION INTRAMUSCULAR; INTRAVENOUS; SUBCUTANEOUS
Qty: 0 | Refills: 0 | Status: DISCONTINUED | OUTPATIENT
Start: 2018-06-16 | End: 2018-06-17

## 2018-06-16 RX ORDER — OXYCODONE AND ACETAMINOPHEN 5; 325 MG/1; MG/1
1 TABLET ORAL EVERY 4 HOURS
Qty: 0 | Refills: 0 | Status: DISCONTINUED | OUTPATIENT
Start: 2018-06-16 | End: 2018-06-17

## 2018-06-16 RX ORDER — HEPARIN SODIUM 5000 [USP'U]/ML
5000 INJECTION INTRAVENOUS; SUBCUTANEOUS EVERY 8 HOURS
Qty: 0 | Refills: 0 | Status: DISCONTINUED | OUTPATIENT
Start: 2018-06-16 | End: 2018-06-17

## 2018-06-16 RX ORDER — HEPARIN SODIUM 5000 [USP'U]/ML
6000 INJECTION INTRAVENOUS; SUBCUTANEOUS ONCE
Qty: 0 | Refills: 0 | Status: DISCONTINUED | OUTPATIENT
Start: 2018-06-16 | End: 2018-06-16

## 2018-06-16 RX ORDER — HEPARIN SODIUM 5000 [USP'U]/ML
6000 INJECTION INTRAVENOUS; SUBCUTANEOUS EVERY 6 HOURS
Qty: 0 | Refills: 0 | Status: DISCONTINUED | OUTPATIENT
Start: 2018-06-16 | End: 2018-06-16

## 2018-06-16 RX ORDER — DIPHENHYDRAMINE HCL 50 MG
50 CAPSULE ORAL ONCE
Qty: 0 | Refills: 0 | Status: COMPLETED | OUTPATIENT
Start: 2018-06-16 | End: 2018-06-16

## 2018-06-16 RX ORDER — DOCUSATE SODIUM 100 MG
100 CAPSULE ORAL THREE TIMES A DAY
Qty: 0 | Refills: 0 | Status: DISCONTINUED | OUTPATIENT
Start: 2018-06-16 | End: 2018-06-17

## 2018-06-16 RX ORDER — SODIUM CHLORIDE 9 MG/ML
1000 INJECTION, SOLUTION INTRAVENOUS
Qty: 0 | Refills: 0 | Status: DISCONTINUED | OUTPATIENT
Start: 2018-06-16 | End: 2018-06-17

## 2018-06-16 RX ORDER — SODIUM CHLORIDE 9 MG/ML
1000 INJECTION INTRAMUSCULAR; INTRAVENOUS; SUBCUTANEOUS ONCE
Qty: 0 | Refills: 0 | Status: COMPLETED | OUTPATIENT
Start: 2018-06-16 | End: 2018-06-16

## 2018-06-16 RX ORDER — HEPARIN SODIUM 5000 [USP'U]/ML
3000 INJECTION INTRAVENOUS; SUBCUTANEOUS EVERY 6 HOURS
Qty: 0 | Refills: 0 | Status: DISCONTINUED | OUTPATIENT
Start: 2018-06-16 | End: 2018-06-16

## 2018-06-16 RX ORDER — ONDANSETRON 8 MG/1
4 TABLET, FILM COATED ORAL EVERY 6 HOURS
Qty: 0 | Refills: 0 | Status: DISCONTINUED | OUTPATIENT
Start: 2018-06-16 | End: 2018-06-17

## 2018-06-16 RX ORDER — OXYCODONE AND ACETAMINOPHEN 5; 325 MG/1; MG/1
2 TABLET ORAL EVERY 6 HOURS
Qty: 0 | Refills: 0 | Status: DISCONTINUED | OUTPATIENT
Start: 2018-06-16 | End: 2018-06-17

## 2018-06-16 RX ORDER — HYDROMORPHONE HYDROCHLORIDE 2 MG/ML
0.5 INJECTION INTRAMUSCULAR; INTRAVENOUS; SUBCUTANEOUS EVERY 6 HOURS
Qty: 0 | Refills: 0 | Status: DISCONTINUED | OUTPATIENT
Start: 2018-06-16 | End: 2018-06-17

## 2018-06-16 RX ORDER — DEXTROSE 50 % IN WATER 50 %
25 SYRINGE (ML) INTRAVENOUS ONCE
Qty: 0 | Refills: 0 | Status: DISCONTINUED | OUTPATIENT
Start: 2018-06-16 | End: 2018-06-17

## 2018-06-16 RX ORDER — DEXTROSE 50 % IN WATER 50 %
12.5 SYRINGE (ML) INTRAVENOUS ONCE
Qty: 0 | Refills: 0 | Status: DISCONTINUED | OUTPATIENT
Start: 2018-06-16 | End: 2018-06-17

## 2018-06-16 RX ORDER — CEFAZOLIN SODIUM 1 G
2000 VIAL (EA) INJECTION EVERY 8 HOURS
Qty: 0 | Refills: 0 | Status: COMPLETED | OUTPATIENT
Start: 2018-06-16 | End: 2018-06-17

## 2018-06-16 RX ORDER — INSULIN GLARGINE 100 [IU]/ML
20 INJECTION, SOLUTION SUBCUTANEOUS AT BEDTIME
Qty: 0 | Refills: 0 | Status: DISCONTINUED | OUTPATIENT
Start: 2018-06-16 | End: 2018-06-17

## 2018-06-16 RX ORDER — METOPROLOL TARTRATE 50 MG
50 TABLET ORAL
Qty: 0 | Refills: 0 | Status: DISCONTINUED | OUTPATIENT
Start: 2018-06-16 | End: 2018-06-17

## 2018-06-16 RX ORDER — DEXTROSE 50 % IN WATER 50 %
15 SYRINGE (ML) INTRAVENOUS ONCE
Qty: 0 | Refills: 0 | Status: DISCONTINUED | OUTPATIENT
Start: 2018-06-16 | End: 2018-06-17

## 2018-06-16 RX ORDER — HEPARIN SODIUM 5000 [USP'U]/ML
6000 INJECTION INTRAVENOUS; SUBCUTANEOUS ONCE
Qty: 0 | Refills: 0 | Status: COMPLETED | OUTPATIENT
Start: 2018-06-16 | End: 2018-06-16

## 2018-06-16 RX ORDER — GLUCAGON INJECTION, SOLUTION 0.5 MG/.1ML
1 INJECTION, SOLUTION SUBCUTANEOUS ONCE
Qty: 0 | Refills: 0 | Status: DISCONTINUED | OUTPATIENT
Start: 2018-06-16 | End: 2018-06-17

## 2018-06-16 RX ORDER — INSULIN LISPRO 100/ML
VIAL (ML) SUBCUTANEOUS
Qty: 0 | Refills: 0 | Status: DISCONTINUED | OUTPATIENT
Start: 2018-06-16 | End: 2018-06-17

## 2018-06-16 RX ORDER — ATORVASTATIN CALCIUM 80 MG/1
80 TABLET, FILM COATED ORAL AT BEDTIME
Qty: 0 | Refills: 0 | Status: DISCONTINUED | OUTPATIENT
Start: 2018-06-16 | End: 2018-06-17

## 2018-06-16 RX ORDER — ASPIRIN/CALCIUM CARB/MAGNESIUM 324 MG
81 TABLET ORAL DAILY
Qty: 0 | Refills: 0 | Status: DISCONTINUED | OUTPATIENT
Start: 2018-06-16 | End: 2018-06-17

## 2018-06-16 RX ORDER — CLOPIDOGREL BISULFATE 75 MG/1
75 TABLET, FILM COATED ORAL DAILY
Qty: 0 | Refills: 0 | Status: DISCONTINUED | OUTPATIENT
Start: 2018-06-16 | End: 2018-06-17

## 2018-06-16 RX ADMIN — HEPARIN SODIUM 5000 UNIT(S): 5000 INJECTION INTRAVENOUS; SUBCUTANEOUS at 22:27

## 2018-06-16 RX ADMIN — Medication 81 MILLIGRAM(S): at 11:22

## 2018-06-16 RX ADMIN — SODIUM CHLORIDE 60 MILLILITER(S): 9 INJECTION INTRAMUSCULAR; INTRAVENOUS; SUBCUTANEOUS at 17:06

## 2018-06-16 RX ADMIN — INSULIN GLARGINE 20 UNIT(S): 100 INJECTION, SOLUTION SUBCUTANEOUS at 22:27

## 2018-06-16 RX ADMIN — Medication 4: at 17:39

## 2018-06-16 RX ADMIN — HEPARIN SODIUM 6000 UNIT(S): 5000 INJECTION INTRAVENOUS; SUBCUTANEOUS at 11:22

## 2018-06-16 RX ADMIN — Medication 40 MILLIGRAM(S): at 11:22

## 2018-06-16 RX ADMIN — Medication 100 MILLIGRAM(S): at 22:27

## 2018-06-16 RX ADMIN — Medication 50 MILLIGRAM(S): at 18:56

## 2018-06-16 RX ADMIN — SODIUM CHLORIDE 2000 MILLILITER(S): 9 INJECTION INTRAMUSCULAR; INTRAVENOUS; SUBCUTANEOUS at 11:22

## 2018-06-16 RX ADMIN — Medication 4: at 22:29

## 2018-06-16 RX ADMIN — Medication 50 MILLIGRAM(S): at 11:22

## 2018-06-16 RX ADMIN — Medication 50 MILLIGRAM(S): at 11:40

## 2018-06-16 RX ADMIN — ATORVASTATIN CALCIUM 80 MILLIGRAM(S): 80 TABLET, FILM COATED ORAL at 22:27

## 2018-06-16 NOTE — H&P ADULT - HISTORY OF PRESENT ILLNESS
57yo male, CONTRAST DYE ALLERGY (HIVES), Former smoker with PMHx of HTN, DM II, CAD s/p 3vCABG at [facility] in 2008 (LIMA-LAD, SVG-OM, SVG-PDA), and recent PCI w/ RAJENDRA placement x2 on Monday 6/11, presenting today to [facility] ED with 1 day history of numbness, coolness, and exertional pain of his R foot. Patient reports he was walking his dog yesterday morning and after walking 1-2 blocks, he experienced acute onset R foot pain and heaviness, which improved with rest. The pain was from the mid calf down to the dorsum of his foot. He also noted sudden numbness and tingling from the ankle down to his foot, and he reports that the numbness and paresthesias have persisted since they started yesterday. Currently he denies rest pain or motor deficits. He denies previous claudication symptoms, previous history of peripheral arterial disease or previous interventions on his lower extremities other than R groin access for his staged PCI. Currently, pain well-controlled. Denies nausea, vomiting, chest pain, shortness of breath, fevers, or chills.

## 2018-06-16 NOTE — ED PROVIDER NOTE - ATTENDING CONTRIBUTION TO CARE
57 y/o male with hx of DM, HTN, CAD s/p cardiac cath on 6/11/18 c/o numbness to right foot x 1 day. pt states numbness and tingling from right ankle to entire right foot. no trauma. no cp or sob. no fever or chills. no back pain. no groin pain. no further complaints.    PE - no acute distress, no dopplerable pulses dp on right - vascular immediately called, recommends heparin and ct angio, will take to OR for intervention, preop in ED.

## 2018-06-16 NOTE — PROGRESS NOTE ADULT - SUBJECTIVE AND OBJECTIVE BOX
Surgery Post-Op Note, PCN:     Pre-Op Dx: NUMBNESS  Limb ischemia  Ischemic foot  Numbness of foot    Procedure: Femoral artery thrombectomy    Surgeon: Co    Subjective:    Vital Signs Last 24 Hrs  T(C): 36.6 (16 Jun 2018 17:00), Max: 36.9 (16 Jun 2018 09:43)  T(F): 97.8 (16 Jun 2018 17:00), Max: 98.4 (16 Jun 2018 09:43)  HR: 78 (16 Jun 2018 18:49) (72 - 86)  BP: 148/70 (16 Jun 2018 18:49) (128/67 - 160/63)  BP(mean): 99 (16 Jun 2018 17:30) (98 - 117)  RR: 18 (16 Jun 2018 18:49) (16 - 20)  SpO2: 97% (16 Jun 2018 18:49) (96% - 100%)    Physical Exam:  General: NAD, resting comfortably in bed  Pulmonary: Nonlabored breathing, no respiratory distress  Cardiovascular: NSR  Abdominal: soft, NT/ND  Extremities: WWP, normal strength  Neuro: A/O x 3, CNs II-XII grossly intact, normal motor/sensation, no focal deficits  Pulses:   Right:                                                                          Left:  FEM [ ]2+ [ ]1+ [ ]doppler                                             FEM [ ]2+ [ ]1+ [ ]doppler    POP [ ]2+ [ ]1+ [ ]doppler                                             POP [ ]2+ [ ]1+ [ ]doppler    DP [ ]2+ [ ]1+ [ ]doppler                                                DP [ ]2+ [ ]1+ [ ]doppler  PT[ ]2+ [ ]1+ [ ]doppler                                                  PT [ ]2+ [ ]1+ [ ]doppler      LABS:                        13.5   9.1   )-----------( 244      ( 16 Jun 2018 10:31 )             38.0     06-16    134<L>  |  100  |  20  ----------------------------<  284<H>  4.3   |  26  |  0.78    Ca    8.9      16 Jun 2018 10:31    TPro  6.7  /  Alb  3.6  /  TBili  0.3  /  DBili  x   /  AST  20  /  ALT  27  /  AlkPhos  83  06-16    PT/INR - ( 16 Jun 2018 10:31 )   PT: 11.6 sec;   INR: 1.04          PTT - ( 16 Jun 2018 10:31 )  PTT:29.8 sec  CAPILLARY BLOOD GLUCOSE  216 (16 Jun 2018 16:00)      POCT Blood Glucose.: 216 mg/dL (16 Jun 2018 16:07)  POCT Blood Glucose.: 193 mg/dL (16 Jun 2018 13:07)    LIVER FUNCTIONS - ( 16 Jun 2018 10:31 )  Alb: 3.6 g/dL / Pro: 6.7 g/dL / ALK PHOS: 83 U/L / ALT: 27 U/L / AST: 20 U/L / GGT: x               Radiology and Additional Studies:    Assessment:58y Male s/p above procedure    Plan:  Pain/nausea control PRN  Home meds  Incentive spirometer/OOB/Ambulate  Vitals per protocol  IVF, Diet:  ToV? Deras?  AM labs Surgery Post-Op Note, PCN:     Pre-Op Dx: NUMBNESS  Limb ischemia  Ischemic foot  Numbness of foot    Procedure: Femoral artery thrombectomy    Surgeon: Mauricio    Subjective: Patient seen at bedside. Doing well. No complaints. R groin incision dressing clean and no complaints of incisional pain. Foot paresthesias improved. Sensory deficits persistent but patient reports possible improvement. Patient eating dinner and tolerating. Pain well-controlled. Denies nausea, vomiting, chest pain, shortness of breath, fevers, or chills.          Vital Signs Last 24 Hrs  T(C): 36.6 (16 Jun 2018 17:00), Max: 36.9 (16 Jun 2018 09:43)  T(F): 97.8 (16 Jun 2018 17:00), Max: 98.4 (16 Jun 2018 09:43)  HR: 78 (16 Jun 2018 18:49) (72 - 86)  BP: 148/70 (16 Jun 2018 18:49) (128/67 - 160/63)  BP(mean): 99 (16 Jun 2018 17:30) (98 - 117)  RR: 18 (16 Jun 2018 18:49) (16 - 20)  SpO2: 97% (16 Jun 2018 18:49) (96% - 100%)    Physical Exam:  Gen:  NAD, resting comfortably  Pulm:  no respiratory distress, nonlabored breathing  C/V: normal sinus rhythm, RRR  Abd: soft, NT/ND  Groin: femoral cutdown incision with dressing in place; dressing CDI; nttp; no fullness  Extrem: WWP, non-edematous, cap refill improved; no motor deficits; sensory deficit about the same as preop  - RLE: triphasic DP, palpable PT; palp pop; femoral pulse not assessed due to new dressing  - LLE: palp DP/PT, pop, femoral        LABS:                        13.5   9.1   )-----------( 244      ( 16 Jun 2018 10:31 )             38.0     06-16    134<L>  |  100  |  20  ----------------------------<  284<H>  4.3   |  26  |  0.78    Ca    8.9      16 Jun 2018 10:31    TPro  6.7  /  Alb  3.6  /  TBili  0.3  /  DBili  x   /  AST  20  /  ALT  27  /  AlkPhos  83  06-16    PT/INR - ( 16 Jun 2018 10:31 )   PT: 11.6 sec;   INR: 1.04          PTT - ( 16 Jun 2018 10:31 )  PTT:29.8 sec  CAPILLARY BLOOD GLUCOSE  216 (16 Jun 2018 16:00)      POCT Blood Glucose.: 216 mg/dL (16 Jun 2018 16:07)  POCT Blood Glucose.: 193 mg/dL (16 Jun 2018 13:07)    LIVER FUNCTIONS - ( 16 Jun 2018 10:31 )  Alb: 3.6 g/dL / Pro: 6.7 g/dL / ALK PHOS: 83 U/L / ALT: 27 U/L / AST: 20 U/L / GGT: x               Radiology and Additional Studies:

## 2018-06-16 NOTE — BRIEF OPERATIVE NOTE - PROCEDURE
<<-----Click on this checkbox to enter Procedure Femoral artery thrombectomy  06/16/2018  - R femoral artery exploration  - vangie catheter thrombectomy  - patch repair  - completion angiogram  Active  NISSA

## 2018-06-16 NOTE — PROGRESS NOTE ADULT - ASSESSMENT
57yo male, CONTRAST DYE ALLERGY (HIVES), Former smoker with PMHx of HTN, DM II, CAD s/p 3vCABG at [facility] in 2008 (LIMA-LAD, SVG-OM, SVG-PDA), and recent PCI w/ RAJENDRA placement x2 on Monday 6/11, presenting today to [facility] ED with 1 day history of numbness, coolness, and exertional pain of his R foot. CTA confirming acute limb ischemia 2/2 short segment occlusion of the R common femoral artery. Patient now s/p R femoral artery exploration, endarterectomy, vangie thrombectomy, patch repair 6/16    Plan:  - pain/nausea control  - home meds  - cc diet/cont. gentle IVF overnight  - king out at midnight, TOV in AM  - asa/plavix/subq hep  - ancef x3 more doses  - ISS + home Lantus  - post op labs  - AM labs

## 2018-06-16 NOTE — ED PROVIDER NOTE - MEDICAL DECISION MAKING DETAILS
numbness to right foot and unable to obtain DP pulse. no evidnce of infection on exam.  vascular consulted and will take to OR.  requesting CTA with run off.  Methylprednisone given and will wait 4 hrs prior to cta given contrast allergy. Heparin given as per vascular recommendations. pt admitted to vascular.

## 2018-06-16 NOTE — H&P ADULT - ATTENDING COMMENTS
Patient seen and examined.    Acute limb ischemia 2/2 cardiac cath and closure device.    Had sensory changes but no motor defecit.    Likely Tori 1 vs. 2A.    Plan to take to OR emergently for revasc.

## 2018-06-16 NOTE — ED PROVIDER NOTE - OBJECTIVE STATEMENT
57 y/o male with hx of DM, HTN, CAD s/p cardiac cath on 6/11/18 c/o numbness to right foot x 1 day. pt states numbness and tingling from right ankle to entire right foot. no trauma. no cp or sob. no fever or chills. no back pain. no groin pain. no further complaints.

## 2018-06-16 NOTE — BRIEF OPERATIVE NOTE - OPERATION/FINDINGS
- R femoral cutdown, exploration  - vertical arteriotomy after proximal and distal control  - endarterectomy of distal common femoral artery  - vangie catheter thrombectomy; no clot present distally; small clot removed proximally with brisk bleeding noted afterward  - patch repair with bovine pericardium  - completion angiogram; patent common femoral; pop; 3 vessel runoff  - post op pulse exam: triphasic DP, palpable PT

## 2018-06-16 NOTE — H&P ADULT - ASSESSMENT
59yo male, CONTRAST DYE ALLERGY (HIVES), Former smoker with PMHx of HTN, DM II, CAD s/p 3vCABG at [facility] in 2008 (LIMA-LAD, SVG-OM, SVG-PDA), and recent coronary angiogram and RAJENDRA stent placement on Monday 6/11, presenting with 1 day history of acute sensory deficits and claudication of the R foot. Patient has acute limb ischemia, marginally threatened    Plan:   - NPO  - stat CTA chest abdomen pelvis with lower extremity run off  - IV solumedrol and Benadryl for contrast allergy  - heparin gtt  - preop for OR for class 1  - EKG, CXR, type and screen

## 2018-06-16 NOTE — H&P ADULT - NSHPPHYSICALEXAM_GEN_ALL_CORE
Gen:  NAD, resting comfortably  Pulm:  no respiratory distress, nonlabored breathing  C/V: normal sinus rhythm, RRR, normotensive  Abd: soft, NT/ND  Extrem:  RLE cooler than LLE on dorsum of foot, non-ruborous, motor intact; 60% decrease in sensory   Pulse Exam:   RLE: weak 1+ femoral pulse; biphasic pop; biphasic PT; no DP signal  LLE: 2+ palp femoral, pop, DP, PT

## 2018-06-16 NOTE — ED PROVIDER NOTE - PHYSICAL EXAMINATION
unable to palpate pulses to right foot. skin warm. cap refill < 2secs.  + decrease sensation from ankle to foot. failed two point discrimination on ankle and foot. PT pulse obtained with doppler but unable to obtain DP pulse. no bruising or redness  right groin. no erythema. non tender. no edema

## 2018-06-16 NOTE — ED ADULT TRIAGE NOTE - CHIEF COMPLAINT QUOTE
Patient came for rt foot numbness and tingling sensation since 8am yesterday . Had coronary angiogram last 06/11/18 .

## 2018-06-16 NOTE — ED ADULT NURSE NOTE - OBJECTIVE STATEMENT
Pt CO Right Foot sensation changes for > 24 hrs.  Pt ambulating with steady gait.  Pt states "I had an Angiogram through my Right groin on Monday."  Right Groin site noted to be CDI at this time.  Pulses to Right foot palpable, skin is warm, dry and normal color.  PT denies N/V/D, SOB, Fevers, CP and Dizziness. Pt CO Right Foot sensation changes for > 24 hrs.  Pt ambulating with steady gait.  Pt states "I had an Angiogram through my Right groin on Monday."  Right Groin site noted to be CDI at this time.  Skin to Right Foot is warm, dry and normal color.  PT denies N/V/D, SOB, Fevers, CP and Dizziness.

## 2018-06-16 NOTE — ED ADULT NURSE REASSESSMENT NOTE - NS ED NURSE REASSESS COMMENT FT1
Report given to OR nurse Elli.  Heparin IVP admin per order, Heparin gtt held for OR.  Vascular resident and OR nurse made aware.

## 2018-06-17 ENCOUNTER — TRANSCRIPTION ENCOUNTER (OUTPATIENT)
Age: 58
End: 2018-06-17

## 2018-06-17 VITALS — TEMPERATURE: 97 F

## 2018-06-17 LAB
ANION GAP SERPL CALC-SCNC: 10 MMOL/L — SIGNIFICANT CHANGE UP (ref 5–17)
BUN SERPL-MCNC: 22 MG/DL — SIGNIFICANT CHANGE UP (ref 7–23)
CALCIUM SERPL-MCNC: 9 MG/DL — SIGNIFICANT CHANGE UP (ref 8.4–10.5)
CHLORIDE SERPL-SCNC: 101 MMOL/L — SIGNIFICANT CHANGE UP (ref 96–108)
CO2 SERPL-SCNC: 25 MMOL/L — SIGNIFICANT CHANGE UP (ref 22–31)
CREAT SERPL-MCNC: 0.95 MG/DL — SIGNIFICANT CHANGE UP (ref 0.5–1.3)
GLUCOSE BLDC GLUCOMTR-MCNC: 150 MG/DL — HIGH (ref 70–99)
GLUCOSE BLDC GLUCOMTR-MCNC: 207 MG/DL — HIGH (ref 70–99)
GLUCOSE SERPL-MCNC: 161 MG/DL — HIGH (ref 70–99)
HCT VFR BLD CALC: 37.4 % — LOW (ref 39–50)
HGB BLD-MCNC: 12.9 G/DL — LOW (ref 13–17)
MAGNESIUM SERPL-MCNC: 1.8 MG/DL — SIGNIFICANT CHANGE UP (ref 1.6–2.6)
MCHC RBC-ENTMCNC: 29.9 PG — SIGNIFICANT CHANGE UP (ref 27–34)
MCHC RBC-ENTMCNC: 34.5 G/DL — SIGNIFICANT CHANGE UP (ref 32–36)
MCV RBC AUTO: 86.8 FL — SIGNIFICANT CHANGE UP (ref 80–100)
PHOSPHATE SERPL-MCNC: 3.8 MG/DL — SIGNIFICANT CHANGE UP (ref 2.5–4.5)
PLATELET # BLD AUTO: 249 K/UL — SIGNIFICANT CHANGE UP (ref 150–400)
POTASSIUM SERPL-MCNC: 4.5 MMOL/L — SIGNIFICANT CHANGE UP (ref 3.5–5.3)
POTASSIUM SERPL-SCNC: 4.5 MMOL/L — SIGNIFICANT CHANGE UP (ref 3.5–5.3)
RBC # BLD: 4.31 M/UL — SIGNIFICANT CHANGE UP (ref 4.2–5.8)
RBC # FLD: 12.9 % — SIGNIFICANT CHANGE UP (ref 10.3–16.9)
SODIUM SERPL-SCNC: 136 MMOL/L — SIGNIFICANT CHANGE UP (ref 135–145)
WBC # BLD: 14 K/UL — HIGH (ref 3.8–10.5)
WBC # FLD AUTO: 14 K/UL — HIGH (ref 3.8–10.5)

## 2018-06-17 RX ORDER — MAGNESIUM OXIDE 400 MG ORAL TABLET 241.3 MG
400 TABLET ORAL ONCE
Qty: 0 | Refills: 0 | Status: COMPLETED | OUTPATIENT
Start: 2018-06-17 | End: 2018-06-17

## 2018-06-17 RX ADMIN — MAGNESIUM OXIDE 400 MG ORAL TABLET 400 MILLIGRAM(S): 241.3 TABLET ORAL at 11:11

## 2018-06-17 RX ADMIN — Medication 50 MILLIGRAM(S): at 06:09

## 2018-06-17 RX ADMIN — OXYCODONE AND ACETAMINOPHEN 2 TABLET(S): 5; 325 TABLET ORAL at 06:13

## 2018-06-17 RX ADMIN — Medication 4: at 11:38

## 2018-06-17 RX ADMIN — HEPARIN SODIUM 5000 UNIT(S): 5000 INJECTION INTRAVENOUS; SUBCUTANEOUS at 06:09

## 2018-06-17 RX ADMIN — Medication 100 MILLIGRAM(S): at 07:11

## 2018-06-17 RX ADMIN — OXYCODONE AND ACETAMINOPHEN 2 TABLET(S): 5; 325 TABLET ORAL at 07:17

## 2018-06-17 RX ADMIN — CLOPIDOGREL BISULFATE 75 MILLIGRAM(S): 75 TABLET, FILM COATED ORAL at 11:11

## 2018-06-17 RX ADMIN — Medication 81 MILLIGRAM(S): at 11:11

## 2018-06-17 RX ADMIN — Medication 100 MILLIGRAM(S): at 11:11

## 2018-06-17 RX ADMIN — Medication 100 MILLIGRAM(S): at 06:09

## 2018-06-17 NOTE — DISCHARGE NOTE ADULT - PLAN OF CARE
wound healing Follow-up with Dr. Martínez in 1-2 weeks in office at [phone]. Remove dressings to shower with soap and water. Dry well. Cover with dry gauze and Tegaderm daily. Please call your doctor if you have a fever of over 101.9 or swelling/bleeding at wound. Diabetes restart metformin 6/18/18  was holding due to contrast injection during operation

## 2018-06-17 NOTE — DISCHARGE NOTE ADULT - CARE PLAN
Principal Discharge DX:	Ischemic foot  Goal:	wound healing  Assessment and plan of treatment:	Follow-up with Dr. Martínez in 1-2 weeks in office at [phone]. Remove dressings to shower with soap and water. Dry well. Cover with dry gauze and Tegaderm daily. Please call your doctor if you have a fever of over 101.9 or swelling/bleeding at wound.  Goal:	Diabetes  Assessment and plan of treatment:	restart metformin 6/18/18  was holding due to contrast injection during operation

## 2018-06-17 NOTE — DISCHARGE NOTE ADULT - CARE PROVIDER_API CALL
South Martínez (MD), Surgery  [address]  [address]  [place], [place] [address]  Phone: [phone]  Fax: [phone]

## 2018-06-17 NOTE — DISCHARGE NOTE ADULT - MEDICATION SUMMARY - MEDICATIONS TO TAKE
I will START or STAY ON the medications listed below when I get home from the hospital:    aspirin 81 mg oral delayed release tablet  -- 1 tab(s) by mouth once a day  -- Indication: For home medication    Cozaar 50 mg oral tablet  -- 1 tab(s) by mouth once a day  -- Indication: For home medication    metFORMIN 1000 mg oral tablet  -- 1 tab(s) by mouth 2 times a day    hold until Friday June 15th, 2018  -- Indication: For home medication    Lantus 100 units/mL subcutaneous solution  -- 35 unit(s) subcutaneous once a day (at bedtime)  -- Indication: For home medication    atorvastatin 80 mg oral tablet  -- 1 tab(s) by mouth once a day (at bedtime)  -- Indication: For home medication    clopidogrel 75 mg oral tablet  -- 1 tab(s) by mouth once a day  -- Indication: For home medication    metoprolol  -- 50 milligram(s) by mouth 2 times a day  -- Indication: For home medication

## 2018-06-17 NOTE — DISCHARGE NOTE ADULT - HOSPITAL COURSE
59yo male, CONTRAST DYE ALLERGY (HIVES), Former smoker with PMHx of HTN, DM II, CAD s/p 3vCABG at [facility] in 2008 (LIMA-LAD, SVG-OM, SVG-PDA), and recent PCI w/ RAJENDRA placement x2 on Monday 6/11, presenting 6/16 to [facility] ED with 1 day history of numbness, coolness, and exertional pain of his R foot. Patient reports he was walking his dog yesterday morning and after walking 1-2 blocks, he experienced acute onset R foot pain and heaviness, which improved with rest. The pain was from the mid calf down to the dorsum of his foot. He also noted sudden numbness and tingling from the ankle down to his foot, and he reports that the numbness and paresthesias have persisted since they started yesterday. Currently he denies rest pain or motor deficits. He denies previous claudication symptoms, previous history of peripheral arterial disease or previous interventions on his lower extremities other than R groin access for his staged PCI. Currently, pain well-controlled. Denies nausea, vomiting, chest pain, shortness of breath, fevers, or chills. s/p R femoral artery exploration, vangie catheter thrombectomy, patch repair, completion angiogram 6/16 His postoperative course was unremarkable with advancement of diet, passing trial of void, and pain control. On day of discharge patient was stable to be d/c'd home.

## 2018-06-17 NOTE — PROGRESS NOTE ADULT - SUBJECTIVE AND OBJECTIVE BOX
24hr Events:  O/N: Post-op lab wnl, king removed at midnight, voided x1  6/16: OR for R femoral artery exploration; thrombectomy; patch repair; POC normal; triphasic DP, palp PT post op      Assessment/Plan;  59yo male, CONTRAST DYE ALLERGY (HIVES), Former smoker with PMHx of HTN, DM II, CAD s/p 3vCABG at [facility] in 2008 (LIMA-LAD, SVG-OM, SVG-PDA), and recent coronary angiogram and RAJENDRA stent placement on Monday 6/11, presenting with 1 day history of acute sensory deficits and claudication of the R foot. Patient has acute limb ischemia, marginally threatened s/p Femoral artery thrombectomy, R femoral artery exploration,  vangie catheter thrombectomy and patch repair 6/16/18      - pain/nausea control  - home meds  - cc diet/cont. gentle IVF overnight  - asa/plavix/subq hep  - ISS + home Lantus  - AM labs 24hr Events:  O/N: Post-op lab wnl, king removed at midnight, voided x1  6/16: OR for R femoral artery exploration; thrombectomy; patch repair; POC normal; triphasic DP, palp PT post op    ceFAZolin   IVPB 2000  aspirin enteric coated 81  ceFAZolin   IVPB 2000  clopidogrel Tablet 75  heparin  Injectable 5000  metoprolol tartrate 50        Vital Signs Last 24 Hrs  T(C): 35.9 (17 Jun 2018 06:51), Max: 36.9 (16 Jun 2018 09:43)  T(F): 96.6 (17 Jun 2018 06:51), Max: 98.4 (16 Jun 2018 09:43)  HR: 65 (17 Jun 2018 05:20) (65 - 86)  BP: 161/74 (17 Jun 2018 05:20) (128/67 - 168/76)  BP(mean): 99 (16 Jun 2018 17:30) (98 - 117)  RR: 18 (17 Jun 2018 05:20) (16 - 20)  SpO2: 96% (17 Jun 2018 05:20) (96% - 100%)  I&O's Summary    16 Jun 2018 07:01  -  17 Jun 2018 07:00  --------------------------------------------------------  IN: 1540 mL / OUT: 250 mL / NET: 1290 mL        Physical Exam:  General: NAD, resting comfortably in bed  Pulmonary: Nonlabored breathing, no respiratory distress  Abd: soft, NT/ND  Groin: femoral cutdown incision with dressing in place; dressing CDI; nttp; no fullness  Extrem: WWP, non-edematous, cap refill improved; no motor deficits; sensory deficit about the same as preop  - RLE: triphasic DP, palpable PT; palp pop; femoral pulse not assessed due to new dressing  - LLE: palp DP/PT, pop, femoral      LABS:                        13.1   14.2  )-----------( 265      ( 16 Jun 2018 22:54 )             38.4     06-16    133<L>  |  98  |  23  ----------------------------<  250<H>  4.9   |  21<L>  |  0.99    Ca    8.4      16 Jun 2018 22:54  Phos  3.8     06-16  Mg     1.7     06-16    TPro  6.7  /  Alb  3.6  /  TBili  0.3  /  DBili  x   /  AST  20  /  ALT  27  /  AlkPhos  83  06-16    PT/INR - ( 16 Jun 2018 10:31 )   PT: 11.6 sec;   INR: 1.04          PTT - ( 16 Jun 2018 10:31 )  PTT:29.8 sec    Radiology and Additional Studies:    Assessment and Plan:     Assessment/Plan;  57yo male, CONTRAST DYE ALLERGY (HIVES), Former smoker with PMHx of HTN, DM II, CAD s/p 3vCABG at [facility] in 2008 (LIMA-LAD, SVG-OM, SVG-PDA), and recent coronary angiogram and RAJENDRA stent placement on Monday 6/11, presenting with 1 day history of acute sensory deficits and claudication of the R foot. Patient has acute limb ischemia, marginally threatened s/p Femoral artery thrombectomy, R femoral artery exploration,  vangie catheter thrombectomy and patch repair 6/16/18      - pain/nausea control  - home meds  - cc diet/cont. gentle IVF overnight  - asa/plavix/hsq  - ISS + home Lantus  - AM labs

## 2018-06-17 NOTE — DISCHARGE NOTE ADULT - PATIENT PORTAL LINK FT
You can access the [facility], offered by [facility], by registering with the following website: [email or web address]

## 2018-06-19 PROCEDURE — 83036 HEMOGLOBIN GLYCOSYLATED A1C: CPT

## 2018-06-19 PROCEDURE — C1757: CPT

## 2018-06-19 PROCEDURE — 85027 COMPLETE CBC AUTOMATED: CPT

## 2018-06-19 PROCEDURE — 88311 DECALCIFY TISSUE: CPT

## 2018-06-19 PROCEDURE — 88304 TISSUE EXAM BY PATHOLOGIST: CPT

## 2018-06-19 PROCEDURE — 96375 TX/PRO/DX INJ NEW DRUG ADDON: CPT

## 2018-06-19 PROCEDURE — 76000 FLUOROSCOPY <1 HR PHYS/QHP: CPT

## 2018-06-19 PROCEDURE — C1894: CPT

## 2018-06-19 PROCEDURE — 86850 RBC ANTIBODY SCREEN: CPT

## 2018-06-19 PROCEDURE — 86900 BLOOD TYPING SEROLOGIC ABO: CPT

## 2018-06-19 PROCEDURE — 80053 COMPREHEN METABOLIC PANEL: CPT

## 2018-06-19 PROCEDURE — 86923 COMPATIBILITY TEST ELECTRIC: CPT

## 2018-06-19 PROCEDURE — 96374 THER/PROPH/DIAG INJ IV PUSH: CPT | Mod: XU

## 2018-06-19 PROCEDURE — C1769: CPT

## 2018-06-19 PROCEDURE — 84100 ASSAY OF PHOSPHORUS: CPT

## 2018-06-19 PROCEDURE — C1781: CPT

## 2018-06-19 PROCEDURE — 85610 PROTHROMBIN TIME: CPT

## 2018-06-19 PROCEDURE — 85730 THROMBOPLASTIN TIME PARTIAL: CPT

## 2018-06-19 PROCEDURE — 80048 BASIC METABOLIC PNL TOTAL CA: CPT

## 2018-06-19 PROCEDURE — 71046 X-RAY EXAM CHEST 2 VIEWS: CPT

## 2018-06-19 PROCEDURE — C1887: CPT

## 2018-06-19 PROCEDURE — 75635 CT ANGIO ABDOMINAL ARTERIES: CPT

## 2018-06-19 PROCEDURE — 36415 COLL VENOUS BLD VENIPUNCTURE: CPT

## 2018-06-19 PROCEDURE — 82962 GLUCOSE BLOOD TEST: CPT

## 2018-06-19 PROCEDURE — 99285 EMERGENCY DEPT VISIT HI MDM: CPT | Mod: 25

## 2018-06-19 PROCEDURE — 71275 CT ANGIOGRAPHY CHEST: CPT

## 2018-06-19 PROCEDURE — 86901 BLOOD TYPING SEROLOGIC RH(D): CPT

## 2018-06-19 PROCEDURE — 83735 ASSAY OF MAGNESIUM: CPT

## 2018-06-19 PROCEDURE — 85025 COMPLETE CBC W/AUTO DIFF WBC: CPT

## 2018-06-21 ENCOUNTER — APPOINTMENT (OUTPATIENT)
Dept: HEART AND VASCULAR | Facility: CLINIC | Age: 58
End: 2018-06-21
Payer: COMMERCIAL

## 2018-06-21 VITALS
OXYGEN SATURATION: 100 % | WEIGHT: 150 LBS | TEMPERATURE: 98.7 F | BODY MASS INDEX: 24.69 KG/M2 | SYSTOLIC BLOOD PRESSURE: 140 MMHG | HEART RATE: 64 BPM | HEIGHT: 65.35 IN | DIASTOLIC BLOOD PRESSURE: 72 MMHG

## 2018-06-21 DIAGNOSIS — Z95.1 PRESENCE OF AORTOCORONARY BYPASS GRAFT: ICD-10-CM

## 2018-06-21 DIAGNOSIS — I10 ESSENTIAL (PRIMARY) HYPERTENSION: ICD-10-CM

## 2018-06-21 DIAGNOSIS — I25.10 ATHEROSCLEROTIC HEART DISEASE OF NATIVE CORONARY ARTERY WITHOUT ANGINA PECTORIS: ICD-10-CM

## 2018-06-21 DIAGNOSIS — Z95.5 PRESENCE OF CORONARY ANGIOPLASTY IMPLANT AND GRAFT: ICD-10-CM

## 2018-06-21 DIAGNOSIS — Z91.041 RADIOGRAPHIC DYE ALLERGY STATUS: ICD-10-CM

## 2018-06-21 DIAGNOSIS — E11.9 TYPE 2 DIABETES MELLITUS WITHOUT COMPLICATIONS: ICD-10-CM
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
## 2018-06-21 DIAGNOSIS — T82.868A THROMBOSIS DUE TO VASCULAR PROSTHETIC DEVICES, IMPLANTS AND GRAFTS, INITIAL ENCOUNTER: ICD-10-CM

## 2018-06-21 DIAGNOSIS — I74.3 EMBOLISM AND THROMBOSIS OF ARTERIES OF THE LOWER EXTREMITIES: ICD-10-CM

## 2018-06-21 DIAGNOSIS — Z87.891 PERSONAL HISTORY OF NICOTINE DEPENDENCE: ICD-10-CM

## 2018-06-21 PROCEDURE — 93000 ELECTROCARDIOGRAM COMPLETE: CPT

## 2018-06-21 PROCEDURE — 99401 PREV MED CNSL INDIV APPRX 15: CPT | Mod: 25

## 2018-06-21 PROCEDURE — 99214 OFFICE O/P EST MOD 30 MIN: CPT

## 2018-06-21 PROCEDURE — 99406 BEHAV CHNG SMOKING 3-10 MIN: CPT | Mod: 25

## 2018-06-22 LAB — SURGICAL PATHOLOGY STUDY: SIGNIFICANT CHANGE UP

## 2018-06-26 ENCOUNTER — APPOINTMENT (OUTPATIENT)
Dept: VASCULAR SURGERY | Facility: CLINIC | Age: 58
End: 2018-06-26
Payer: COMMERCIAL

## 2018-06-26 VITALS
HEIGHT: 65 IN | TEMPERATURE: 99.1 F | BODY MASS INDEX: 24.99 KG/M2 | DIASTOLIC BLOOD PRESSURE: 97 MMHG | OXYGEN SATURATION: 99 % | SYSTOLIC BLOOD PRESSURE: 196 MMHG | WEIGHT: 150 LBS

## 2018-06-26 PROCEDURE — 93971 EXTREMITY STUDY: CPT | Mod: 79

## 2018-06-26 PROCEDURE — 99024 POSTOP FOLLOW-UP VISIT: CPT

## 2018-07-19 ENCOUNTER — APPOINTMENT (OUTPATIENT)
Dept: HEART AND VASCULAR | Facility: CLINIC | Age: 58
End: 2018-07-19

## 2018-07-24 ENCOUNTER — APPOINTMENT (OUTPATIENT)
Dept: HEART AND VASCULAR | Facility: CLINIC | Age: 58
End: 2018-07-24
Payer: COMMERCIAL

## 2018-07-24 VITALS
SYSTOLIC BLOOD PRESSURE: 130 MMHG | HEART RATE: 59 BPM | DIASTOLIC BLOOD PRESSURE: 73 MMHG | HEIGHT: 65 IN | WEIGHT: 150.99 LBS | BODY MASS INDEX: 25.16 KG/M2 | TEMPERATURE: 98.7 F | OXYGEN SATURATION: 99 %

## 2018-07-24 DIAGNOSIS — I99.8 OTHER DISORDER OF CIRCULATORY SYSTEM: ICD-10-CM

## 2018-07-24 PROCEDURE — 99406 BEHAV CHNG SMOKING 3-10 MIN: CPT | Mod: 25

## 2018-07-24 PROCEDURE — 99214 OFFICE O/P EST MOD 30 MIN: CPT

## 2018-07-24 PROCEDURE — 93000 ELECTROCARDIOGRAM COMPLETE: CPT

## 2018-07-24 PROCEDURE — 99401 PREV MED CNSL INDIV APPRX 15: CPT | Mod: 25

## 2018-07-24 RX ORDER — INSULIN GLARGINE 100 [IU]/ML
100 INJECTION, SOLUTION SUBCUTANEOUS
Refills: 0 | Status: ACTIVE | COMMUNITY

## 2018-07-24 RX ORDER — ATORVASTATIN CALCIUM 80 MG/1
80 TABLET, FILM COATED ORAL DAILY
Refills: 0 | Status: ACTIVE | COMMUNITY

## 2018-07-24 RX ORDER — METOPROLOL TARTRATE 25 MG/1
25 TABLET, FILM COATED ORAL TWICE DAILY
Qty: 180 | Refills: 3 | Status: ACTIVE | COMMUNITY
Start: 1900-01-01 | End: 1900-01-01

## 2018-07-31 ENCOUNTER — APPOINTMENT (OUTPATIENT)
Dept: VASCULAR SURGERY | Facility: CLINIC | Age: 58
End: 2018-07-31
Payer: COMMERCIAL

## 2018-07-31 PROCEDURE — 99024 POSTOP FOLLOW-UP VISIT: CPT

## 2018-10-18 ENCOUNTER — APPOINTMENT (OUTPATIENT)
Dept: HEART AND VASCULAR | Facility: CLINIC | Age: 58
End: 2018-10-18

## 2018-10-25 ENCOUNTER — APPOINTMENT (OUTPATIENT)
Dept: HEART AND VASCULAR | Facility: CLINIC | Age: 58
End: 2018-10-25
Payer: COMMERCIAL

## 2018-10-25 VITALS
WEIGHT: 156 LBS | HEART RATE: 58 BPM | TEMPERATURE: 98 F | OXYGEN SATURATION: 98 % | SYSTOLIC BLOOD PRESSURE: 160 MMHG | DIASTOLIC BLOOD PRESSURE: 72 MMHG | HEIGHT: 65 IN | BODY MASS INDEX: 25.99 KG/M2

## 2018-10-25 PROCEDURE — 99401 PREV MED CNSL INDIV APPRX 15: CPT | Mod: 25

## 2018-10-25 PROCEDURE — 99406 BEHAV CHNG SMOKING 3-10 MIN: CPT | Mod: 25

## 2018-10-25 PROCEDURE — 93000 ELECTROCARDIOGRAM COMPLETE: CPT

## 2018-10-25 PROCEDURE — 99215 OFFICE O/P EST HI 40 MIN: CPT

## 2019-04-10 ENCOUNTER — APPOINTMENT (OUTPATIENT)
Dept: HEART AND VASCULAR | Facility: CLINIC | Age: 59
End: 2019-04-10
Payer: COMMERCIAL

## 2019-04-10 VITALS
BODY MASS INDEX: 25.96 KG/M2 | DIASTOLIC BLOOD PRESSURE: 72 MMHG | WEIGHT: 155.8 LBS | SYSTOLIC BLOOD PRESSURE: 142 MMHG | HEART RATE: 66 BPM | TEMPERATURE: 98.5 F | HEIGHT: 64.88 IN | OXYGEN SATURATION: 99 %

## 2019-04-10 DIAGNOSIS — Z78.9 OTHER SPECIFIED HEALTH STATUS: ICD-10-CM

## 2019-04-10 PROCEDURE — 96161 CAREGIVER HEALTH RISK ASSMT: CPT

## 2019-04-10 PROCEDURE — 99401 PREV MED CNSL INDIV APPRX 15: CPT | Mod: 25

## 2019-04-10 PROCEDURE — 99214 OFFICE O/P EST MOD 30 MIN: CPT | Mod: 25

## 2019-04-10 PROCEDURE — G0446: CPT | Mod: 59

## 2019-04-10 NOTE — DISCUSSION/SUMMARY
[FreeTextEntry1] : The number of diagnostic and/or management options \par CAD\par Pt now s/p cardiac catheterization 4/17/2018 with \par RAJENDRA x1 LM to OM1 100% mLAD fill via LIMA, 100% OM fills via SVG, 80% pRCA, 90% \par mRCA, EF 45-50% by ECHO, R groin PC, .  ECHO \par 4/18/18 with EF 45-50% and mild global hypokinesis. N\par \par  continue aspirin 81 mg oral daily, plavix \par 75 mg oral daily, atoravstatin 80 mg oral daily, metoprolol tartrate 50 mg oral \par daily, and losartan 50 mg oral daily. \par \par CCS3 Sx persitst to return for PCI of RCA  w Dr Day 6/11/18\par \par repeat echo \par continue dapt, cardiac clear for egd/colon - hold plavix 75mg 7 d prior and restart after procedure, must continue asa indefinately\par \par \par \par Labs, radiology: ekg echo cath\par \par Aspirin therapy: yes\par \par LDL: statin\par \par Overall Risk: High Complexity\par \par Administration of PHQ-2/9 for the benefit of the patient\par Score = 0\par Rx = Reassurance provided\par \par Prevention counseling 17min\par Patient was competent and alert at the time of the counseling provided. Will reinforce subsequent visit.\par ·	The patient’s blood pressure goal SBP<130mmHg\par ·	Advised Mediterranean diet discussion, No salt diet - including increased CAD PAD and mortality \par ·	Assessed willingness to attempt - contemplation stage\par ·	Providing methods and skills for prevention - prevention medicine referral, nutritionist\par ·	Medication management - n/a\par ·	Resources provided - prevention medicine referral, nutritionist, cbt therapy, group therapy\par ·	Setting goals - not ready to commit to a date              \par ·	Follow-up arranged - next scheduled visit\par \par

## 2019-04-10 NOTE — HISTORY OF PRESENT ILLNESS
[FreeTextEntry1] : 59yo male, CONTRAST DYE ALLERGY (HIVES), Former smoker with PMHx of HTN, DM II, \par CAD s/p 3vCABG at [facility] in 2008 (LIMA-LAD, SVG-OM, SVG-PDA), and recent PCI w/ \par RAJENDRA placement x2 on Monday 6/11, presenting 6/16 to [facility] ED with 1 day history \par of numbness, coolness, and exertional pain of his R foot. Patient reports he \par was walking his dog yesterday morning and after walking 1-2 blocks, he \par experienced acute onset R foot pain and heaviness, which improved with rest. \par The pain was from the mid calf down to the dorsum of his foot. He also noted \par sudden numbness and tingling from the ankle down to his foot, and he reports \par that the numbness and paresthesias have persisted since they started yesterday. \par Currently he denies rest pain or motor deficits. He denies previous \par claudication symptoms, previous history of peripheral arterial disease or \par previous interventions on his lower extremities other than R groin access for \par his staged PCI. Currently, pain well-controlled. Denies nausea, vomiting, chest \par pain, shortness of breath, fevers, or chills. s/p R femoral artery exploration, \par vangie catheter thrombectomy, patch repair, completion angiogram 6/16 His \par postoperative course was unremarkable with advancement of diet, passing trial \par of void, and pain control. \par \par of note was called by vascular as pt presented with claudication szx sent to OR for perclose fixure, endarterectomy and patch\par groin site stable\par no cp sob palp\par good pulses RLE\par \par 7/24/18 still c/o RLE numbness, and fatigue hypotension\par \par 4/10/19 USOH, 100% compliant with meds\par location: chest\par duration: na\par  modifying factors: na\par timing: na\par severity: 0/10\par EKG unchanged from prior (in chart)\par consultation notes reviewed where appropriate\par

## 2019-04-10 NOTE — PHYSICAL EXAM
[General Appearance - Well Developed] : well developed [Well Groomed] : well groomed [Normal Appearance] : normal appearance [No Deformities] : no deformities [General Appearance - Well Nourished] : well nourished [Normal Conjunctiva] : the conjunctiva exhibited no abnormalities [General Appearance - In No Acute Distress] : no acute distress [Eyelids - No Xanthelasma] : the eyelids demonstrated no xanthelasmas [No Oral Pallor] : no oral pallor [Normal Oral Mucosa] : normal oral mucosa [No Oral Cyanosis] : no oral cyanosis [Normal Jugular Venous A Waves Present] : normal jugular venous A waves present [Normal Jugular Venous V Waves Present] : normal jugular venous V waves present [No Jugular Venous Morgan A Waves] : no jugular venous morgan A waves [Respiration, Rhythm And Depth] : normal respiratory rhythm and effort [Auscultation Breath Sounds / Voice Sounds] : lungs were clear to auscultation bilaterally [Exaggerated Use Of Accessory Muscles For Inspiration] : no accessory muscle use [Heart Sounds] : normal S1 and S2 [Heart Rate And Rhythm] : heart rate and rhythm were normal [Abdomen Soft] : soft [Murmurs] : no murmurs present [Abdomen Tenderness] : non-tender [Abdomen Mass (___ Cm)] : no abdominal mass palpated [Nail Clubbing] : no clubbing of the fingernails [Abnormal Walk] : normal gait [Gait - Sufficient For Exercise Testing] : the gait was sufficient for exercise testing [Petechial Hemorrhages (___cm)] : no petechial hemorrhages [Cyanosis, Localized] : no localized cyanosis [Skin Color & Pigmentation] : normal skin color and pigmentation [] : no rash [Skin Lesions] : no skin lesions [No Venous Stasis] : no venous stasis [No Xanthoma] : no  xanthoma was observed [No Skin Ulcers] : no skin ulcer [Affect] : the affect was normal [Oriented To Time, Place, And Person] : oriented to person, place, and time [No Anxiety] : not feeling anxious [Mood] : the mood was normal

## 2019-05-16 ENCOUNTER — APPOINTMENT (OUTPATIENT)
Dept: HEART AND VASCULAR | Facility: CLINIC | Age: 59
End: 2019-05-16
Payer: COMMERCIAL

## 2019-05-16 PROCEDURE — 93306 TTE W/DOPPLER COMPLETE: CPT

## 2019-07-15 ENCOUNTER — EMERGENCY (EMERGENCY)
Facility: HOSPITAL | Age: 59
LOS: 1 days | Discharge: HOME | End: 2019-07-15
Attending: EMERGENCY MEDICINE | Admitting: EMERGENCY MEDICINE
Payer: COMMERCIAL

## 2019-07-15 ENCOUNTER — INBOUND DOCUMENT (OUTPATIENT)
Age: 59
End: 2019-07-15

## 2019-07-15 VITALS
TEMPERATURE: 98 F | RESPIRATION RATE: 18 BRPM | SYSTOLIC BLOOD PRESSURE: 152 MMHG | HEART RATE: 76 BPM | DIASTOLIC BLOOD PRESSURE: 76 MMHG | OXYGEN SATURATION: 98 %

## 2019-07-15 VITALS
TEMPERATURE: 98 F | RESPIRATION RATE: 18 BRPM | WEIGHT: 149.91 LBS | DIASTOLIC BLOOD PRESSURE: 74 MMHG | HEART RATE: 73 BPM | SYSTOLIC BLOOD PRESSURE: 192 MMHG

## 2019-07-15 DIAGNOSIS — Z95.1 PRESENCE OF AORTOCORONARY BYPASS GRAFT: Chronic | ICD-10-CM

## 2019-07-15 DIAGNOSIS — Z79.4 LONG TERM (CURRENT) USE OF INSULIN: ICD-10-CM

## 2019-07-15 DIAGNOSIS — K85.90 ACUTE PANCREATITIS WITHOUT NECROSIS OR INFECTION, UNSPECIFIED: ICD-10-CM

## 2019-07-15 DIAGNOSIS — E11.9 TYPE 2 DIABETES MELLITUS WITHOUT COMPLICATIONS: ICD-10-CM

## 2019-07-15 DIAGNOSIS — I10 ESSENTIAL (PRIMARY) HYPERTENSION: ICD-10-CM

## 2019-07-15 DIAGNOSIS — Z79.82 LONG TERM (CURRENT) USE OF ASPIRIN: ICD-10-CM

## 2019-07-15 DIAGNOSIS — Z79.84 LONG TERM (CURRENT) USE OF ORAL HYPOGLYCEMIC DRUGS: ICD-10-CM

## 2019-07-15 DIAGNOSIS — I25.10 ATHEROSCLEROTIC HEART DISEASE OF NATIVE CORONARY ARTERY WITHOUT ANGINA PECTORIS: ICD-10-CM

## 2019-07-15 DIAGNOSIS — Z95.5 PRESENCE OF CORONARY ANGIOPLASTY IMPLANT AND GRAFT: Chronic | ICD-10-CM

## 2019-07-15 DIAGNOSIS — Z79.899 OTHER LONG TERM (CURRENT) DRUG THERAPY: ICD-10-CM

## 2019-07-15 DIAGNOSIS — Z98.890 OTHER SPECIFIED POSTPROCEDURAL STATES: Chronic | ICD-10-CM

## 2019-07-15 DIAGNOSIS — R10.13 EPIGASTRIC PAIN: ICD-10-CM

## 2019-07-15 LAB
ALBUMIN SERPL ELPH-MCNC: 3.9 G/DL — SIGNIFICANT CHANGE UP (ref 3.3–5)
ALP SERPL-CCNC: 73 U/L — SIGNIFICANT CHANGE UP (ref 40–120)
ALT FLD-CCNC: 22 U/L — SIGNIFICANT CHANGE UP (ref 10–45)
ANION GAP SERPL CALC-SCNC: 9 MMOL/L — SIGNIFICANT CHANGE UP (ref 5–17)
AST SERPL-CCNC: 23 U/L — SIGNIFICANT CHANGE UP (ref 10–40)
BASOPHILS # BLD AUTO: 0.06 K/UL — SIGNIFICANT CHANGE UP (ref 0–0.2)
BASOPHILS NFR BLD AUTO: 0.7 % — SIGNIFICANT CHANGE UP (ref 0–2)
BILIRUB SERPL-MCNC: 0.2 MG/DL — SIGNIFICANT CHANGE UP (ref 0.2–1.2)
BUN SERPL-MCNC: 19 MG/DL — SIGNIFICANT CHANGE UP (ref 7–23)
CALCIUM SERPL-MCNC: 9.5 MG/DL — SIGNIFICANT CHANGE UP (ref 8.4–10.5)
CHLORIDE SERPL-SCNC: 102 MMOL/L — SIGNIFICANT CHANGE UP (ref 96–108)
CO2 SERPL-SCNC: 27 MMOL/L — SIGNIFICANT CHANGE UP (ref 22–31)
CREAT SERPL-MCNC: 1.14 MG/DL — SIGNIFICANT CHANGE UP (ref 0.5–1.3)
EOSINOPHIL # BLD AUTO: 0.21 K/UL — SIGNIFICANT CHANGE UP (ref 0–0.5)
EOSINOPHIL NFR BLD AUTO: 2.4 % — SIGNIFICANT CHANGE UP (ref 0–6)
GLUCOSE SERPL-MCNC: 212 MG/DL — HIGH (ref 70–99)
HCT VFR BLD CALC: 39.9 % — SIGNIFICANT CHANGE UP (ref 39–50)
HGB BLD-MCNC: 13.1 G/DL — SIGNIFICANT CHANGE UP (ref 13–17)
IMM GRANULOCYTES NFR BLD AUTO: 0.2 % — SIGNIFICANT CHANGE UP (ref 0–1.5)
LIDOCAIN IGE QN: 266 U/L — HIGH (ref 7–60)
LYMPHOCYTES # BLD AUTO: 1.54 K/UL — SIGNIFICANT CHANGE UP (ref 1–3.3)
LYMPHOCYTES # BLD AUTO: 17.7 % — SIGNIFICANT CHANGE UP (ref 13–44)
MCHC RBC-ENTMCNC: 29.7 PG — SIGNIFICANT CHANGE UP (ref 27–34)
MCHC RBC-ENTMCNC: 32.8 GM/DL — SIGNIFICANT CHANGE UP (ref 32–36)
MCV RBC AUTO: 90.5 FL — SIGNIFICANT CHANGE UP (ref 80–100)
MONOCYTES # BLD AUTO: 0.77 K/UL — SIGNIFICANT CHANGE UP (ref 0–0.9)
MONOCYTES NFR BLD AUTO: 8.9 % — SIGNIFICANT CHANGE UP (ref 2–14)
NEUTROPHILS # BLD AUTO: 6.1 K/UL — SIGNIFICANT CHANGE UP (ref 1.8–7.4)
NEUTROPHILS NFR BLD AUTO: 70.1 % — SIGNIFICANT CHANGE UP (ref 43–77)
NRBC # BLD: 0 /100 WBCS — SIGNIFICANT CHANGE UP (ref 0–0)
PLATELET # BLD AUTO: 252 K/UL — SIGNIFICANT CHANGE UP (ref 150–400)
POTASSIUM SERPL-MCNC: 4.3 MMOL/L — SIGNIFICANT CHANGE UP (ref 3.5–5.3)
POTASSIUM SERPL-SCNC: 4.3 MMOL/L — SIGNIFICANT CHANGE UP (ref 3.5–5.3)
PROT SERPL-MCNC: 7 G/DL — SIGNIFICANT CHANGE UP (ref 6–8.3)
RBC # BLD: 4.41 M/UL — SIGNIFICANT CHANGE UP (ref 4.2–5.8)
RBC # FLD: 12.2 % — SIGNIFICANT CHANGE UP (ref 10.3–14.5)
SODIUM SERPL-SCNC: 138 MMOL/L — SIGNIFICANT CHANGE UP (ref 135–145)
WBC # BLD: 8.7 K/UL — SIGNIFICANT CHANGE UP (ref 3.8–10.5)
WBC # FLD AUTO: 8.7 K/UL — SIGNIFICANT CHANGE UP (ref 3.8–10.5)

## 2019-07-15 PROCEDURE — 80053 COMPREHEN METABOLIC PANEL: CPT

## 2019-07-15 PROCEDURE — 96374 THER/PROPH/DIAG INJ IV PUSH: CPT

## 2019-07-15 PROCEDURE — 85025 COMPLETE CBC W/AUTO DIFF WBC: CPT

## 2019-07-15 PROCEDURE — 93010 ELECTROCARDIOGRAM REPORT: CPT

## 2019-07-15 PROCEDURE — 74176 CT ABD & PELVIS W/O CONTRAST: CPT

## 2019-07-15 PROCEDURE — 87086 URINE CULTURE/COLONY COUNT: CPT

## 2019-07-15 PROCEDURE — 96375 TX/PRO/DX INJ NEW DRUG ADDON: CPT

## 2019-07-15 PROCEDURE — 99284 EMERGENCY DEPT VISIT MOD MDM: CPT

## 2019-07-15 PROCEDURE — 81001 URINALYSIS AUTO W/SCOPE: CPT

## 2019-07-15 PROCEDURE — 36415 COLL VENOUS BLD VENIPUNCTURE: CPT

## 2019-07-15 PROCEDURE — 93005 ELECTROCARDIOGRAM TRACING: CPT

## 2019-07-15 PROCEDURE — 74176 CT ABD & PELVIS W/O CONTRAST: CPT | Mod: 26

## 2019-07-15 PROCEDURE — 83690 ASSAY OF LIPASE: CPT

## 2019-07-15 PROCEDURE — 99284 EMERGENCY DEPT VISIT MOD MDM: CPT | Mod: 25

## 2019-07-15 RX ORDER — FAMOTIDINE 10 MG/ML
1 INJECTION INTRAVENOUS
Qty: 30 | Refills: 0
Start: 2019-07-15 | End: 2019-08-13

## 2019-07-15 RX ORDER — IOHEXOL 300 MG/ML
30 INJECTION, SOLUTION INTRAVENOUS ONCE
Refills: 0 | Status: COMPLETED | OUTPATIENT
Start: 2019-07-15 | End: 2019-07-15

## 2019-07-15 RX ORDER — FAMOTIDINE 10 MG/ML
20 INJECTION INTRAVENOUS ONCE
Refills: 0 | Status: COMPLETED | OUTPATIENT
Start: 2019-07-15 | End: 2019-07-15

## 2019-07-15 RX ORDER — ONDANSETRON 8 MG/1
4 TABLET, FILM COATED ORAL ONCE
Refills: 0 | Status: COMPLETED | OUTPATIENT
Start: 2019-07-15 | End: 2019-07-15

## 2019-07-15 RX ADMIN — FAMOTIDINE 20 MILLIGRAM(S): 10 INJECTION INTRAVENOUS at 16:26

## 2019-07-15 RX ADMIN — IOHEXOL 30 MILLILITER(S): 300 INJECTION, SOLUTION INTRAVENOUS at 16:26

## 2019-07-15 RX ADMIN — Medication 30 MILLILITER(S): at 16:26

## 2019-07-15 RX ADMIN — ONDANSETRON 4 MILLIGRAM(S): 8 TABLET, FILM COATED ORAL at 16:25

## 2019-07-15 NOTE — ED ADULT NURSE NOTE - CHPI ED NUR SYMPTOMS NEG
no abdominal distension/no burning urination/no fever/no blood in stool/no chills/no hematuria/no dysuria/no diarrhea/no vomiting

## 2019-07-15 NOTE — ED PROVIDER NOTE - GASTROINTESTINAL, MLM
Abdomen soft, ttp epigastric, ruq, suprapubic, no pulsatile mass, no astorga's, no cvat, no rebound, no guarding.

## 2019-07-15 NOTE — ED PROVIDER NOTE - CPE EDP GASTRO NORM
Telephone Encounter by Che Reilly RN at 11/15/17 09:30 AM     Author:  Che Reilly RN Service:  (none) Author Type:  Registered Nurse     Filed:  11/15/17 09:31 AM Encounter Date:  10/31/2017 Status:  Signed     :  Che Reilly RN (Registered Nurse)            [facility] does not see the patient until 11/28.  Will watch for INR to be done on 11/17.[DP1.1M]      Revision History        User Key Date/Time User Provider Type Action    > DP1.1 11/15/17 09:31 AM Che Reilly, RN Registered Nurse Sign    M - Manual             normal...

## 2019-07-15 NOTE — ED PROVIDER NOTE - NSFOLLOWUPINSTRUCTIONS_ED_ALL_ED_FT
Pancreatitis    Use tylenol and/or ibuprofen as needed for pain.  Take pepcid daily.  Follow up with your pmd and a gastroenterologist.  Eat a clear liquid diet until your symptoms improve, then return to a normal diet.  Return for increased pain, vomiting or diarrhea, fever, any other concerns.    Acute Pancreatitis  Image   Acute pancreatitis is a condition in which the pancreas suddenly becomes irritated and swollen (has inflammation). The pancreas is a gland that is located behind the stomach. It produces enzymes that help to digest food. The pancreas also releases the hormones glucagon and insulin, which help to regulate blood sugar. Damage to the pancreas occurs when the digestive enzymes from the pancreas are activated before they are released into the intestine.    Most acute attacks last a couple of days and can cause serious problems. Some people become dehydrated and develop low blood pressure. In severe cases, bleeding into the pancreas can lead to shock and can be life-threatening. The lungs, heart, and kidneys may fail.    What are the causes?  The most common causes of this condition are:  Alcohol abuse.  Gallstones.  Other causes include:  Certain medicines.  Exposure to certain chemicals.  Infection.  Damage caused by an accident (trauma).  Abdominal surgery.  In some cases, the cause may not be known.    What are the signs or symptoms?  Symptoms of this condition include:  Pain in the upper abdomen that may radiate to the back.  Tenderness and swelling of the abdomen.  Nausea and vomiting.  How is this diagnosed?  This condition may be diagnosed based on:  A physical exam.  Blood tests.  Imaging tests, such as X-rays, CT scans, or an ultrasound of the abdomen.  How is this treated?  Treatment for this condition usually requires a stay in the hospital. Treatment may include:  Pain medicine.  Fluid replacement through an IV tube.  Placing a tube in the stomach to remove stomach contents and to control vomiting (NG tube, or nasogastric tube).  Not eating for 3–4 days. This gives the pancreas a rest, because enzymes are not being produced that can cause further damage.  Antibiotic medicines, if your condition is caused by an infection.  Surgery on the pancreas or gallbladder.  Follow these instructions at home:  Eating and drinking     Image   Follow instructions from your health care provider about diet. This may involve avoiding alcohol and decreasing the amount of fat in your diet.  Eat smaller, more frequent meals. This reduces the amount of digestive fluids that the pancreas produces.  Drink enough fluid to keep your urine clear or pale yellow.  Do not drink alcohol if it caused your condition.  General instructions     Take over-the-counter and prescription medicines only as told by your health care provider.  Do not use any tobacco products, such as cigarettes, chewing tobacco, and e-cigarettes. If you need help quitting, ask your health care provider.  Get plenty of rest.  If directed, check your blood sugar at home as told by your health care provider.  Keep all follow-up visits as told by your health care provider. This is important.  Contact a health care provider if:  You do not recover as quickly as expected.  You develop new or worsening symptoms.  You have persistent pain, weakness, or nausea.  You recover and then have another episode of pain.  You have a fever.  Get help right away if:  You cannot eat or keep fluids down.  Your pain becomes severe.  Your skin or the white part of your eyes turns yellow (jaundice).  You vomit.  You feel dizzy or you faint.  Your blood sugar is high (over 300 mg/dL).  -----    Pancreatitis Eating Plan  Pancreatitis is when your pancreas becomes irritated and swollen (inflamed). The pancreas is a small organ located behind your stomach. It helps your body digest food and regulate your blood sugar. Pancreatitis can affect how your body digests food, especially foods with fat. You may also have other symptoms such as abdominal pain or nausea.    When you have pancreatitis, following a low-fat eating plan may help you manage symptoms and recover more quickly. Work with your health care provider or a diet and nutrition specialist (dietitian) to create an eating plan that is right for you.    What are tips for following this plan?  Reading food labels     Use the information on food labels to help keep track of how much fat you eat:  Check the serving size.  Look for the amount of total fat in grams (g) in one serving.  Low-fat foods have 3 g of fat or less per serving.  Fat-free foods have 0.5 g of fat or less per serving.  Keep track of how much fat you eat based on how many servings you eat.  For example, if you eat two servings, the amount of fat you eat will be two times what is listed on the label.  Shopping    Buy low-fat or nonfat foods, such as:  Fresh, frozen, or canned fruits and vegetables.  Grains, including pasta, bread, and rice.  Lean meat, poultry, fish, and other protein foods.  Low-fat or nonfat dairy.  Avoid buying bakery products and other sweets made with whole milk, butter, and eggs.  Avoid buying snack foods with added fat, such as anything with butter or cheese flavoring.  Cooking    Remove skin from poultry, and remove extra fat from meat.  Limit the amount of fat and oil you use to 6 teaspoons or less per day.  Cook using low-fat methods, such as boiling, broiling, grilling, steaming, or baking.  Use spray oil to cook. Add fat-free chicken broth to add flavor and moisture.  Avoid adding cream to thicken soups or sauces. Use other thickeners such as corn starch or tomato paste.  Meal planning    ImageEat a low-fat diet as told by your dietitian. For most people, this means having no more than 55–65 grams of fat each day.  Eat small, frequent meals throughout the day. For example, you may have 5–6 small meals instead of 3 large meals.  Drink enough fluid to keep your urine pale yellow.  Do not drink alcohol. Talk to your health care provider if you need help stopping.  Limit how much caffeine you have, including black coffee, black and green tea, caffeinated soft drinks, and energy drinks.  General information    Let your health care provider or dietitian know if you have unplanned weight loss on this eating plan.  You may be instructed to follow a clear liquid diet during a flare of symptoms. Talk with your health care provider about how to manage your diet during symptoms of a flare.  Take any vitamins or supplements as told by your health care provider.  Work with a dietitian, especially if you have other conditions such as obesity or diabetes mellitus.  What foods should I avoid?  Fruits     Fried fruits. Fruits served with butter or cream.    Vegetables     Fried vegetables. Vegetables cooked with butter, cheese, or cream.    Grains     Biscuits, waffles, donuts, pastries, and croissants. Pies and cookies. Butter-flavored popcorn. Regular crackers.    Meats and other protein foods     Fatty cuts of meat. Poultry with skin. Organ meats. Stover, sausage, and cold cuts. Whole eggs. Nuts and nut butters.    Dairy     Whole and 2% milk. Whole milk yogurt. Whole milk ice cream. Cream and half-and-half. Cream cheese. Sour cream. Cheese.    Beverages     Wine, beer, and liquor.    The items listed above may not be a complete list of foods and beverages to avoid. Contact a dietitian for more information.     Summary  Pancreatitis can affect how your body digests food, especially foods with fat.   When you have pancreatitis, it is recommended that you follow a low-fat eating plan to help you recover more quickly and manage symptoms. For most people, this means limiting fat to no more than 55–65 grams per day.  Do not drink alcohol. Limit the amount of caffeine you have, and drink enough fluid to keep your urine pale yellow.

## 2019-07-15 NOTE — ED PROVIDER NOTE - CARE PROVIDERS DIRECT ADDRESSES
[email or web address]

## 2019-07-15 NOTE — ED ADULT NURSE REASSESSMENT NOTE - NS ED NURSE REASSESS COMMENT FT1
Abdominal pain and symptoms improved s/p meds, CT scan done, vital signs stable, fluids PO tolerated well. Discharged to home in stable condition.

## 2019-07-15 NOTE — ED PROVIDER NOTE - CARE PROVIDER_API CALL
Pelon Narvaez (MD)  Gastroenterology; Internal Medicine  [address] [place] [address]  Phone: [phone]  Fax: [phone]  Follow Up Time:

## 2019-07-15 NOTE — ED PROVIDER NOTE - CLINICAL SUMMARY MEDICAL DECISION MAKING FREE TEXT BOX
Pt c/o abd pain x 1 wk, none on exam and no ttp.  ? pud, ? cholecystitis, pancreatitis, less likely kidney stone, aaa; no exertional sx to suggest atypical cardiac.   Plan labs, ct abd, ekg, gi meds, reassess.

## 2019-07-15 NOTE — ED ADULT NURSE REASSESSMENT NOTE - NS ED NURSE REASSESS COMMENT FT1
Patient c/o of abdominal pain, upper abdomen, no nausea/vomitting. VItal signs stable. Right AC PIV #20 in place, all labs sent.  NSS 1 L bolus, Maalox PO, Famotidine IVP, Zofran IVP administered w/ good pain.  Oral contrast tolerated well. CT scan done.  VItal signs stable.  NPO observed.

## 2019-07-15 NOTE — ED PROVIDER NOTE - PROGRESS NOTE DETAILS
Pt feeling better.  Lipase elevated - pt reports no etoh x 2-3 mo, no heavy etoh in the past.  CT w/o sig abnl including nl gb and pancreas.  Pt offered admit for  pain mgmt, iv fluids, bowel rest but declined; prefers dc and outpt fu.  PMD did not return call.  Discussed return for increased pain, inability to tolerate po's, fever, any other concerns.  Pt placed in referrals coordinator book to help w gi fu. Pt discussed w Dr Costa - he wants to see pt in the office tomorrow.

## 2019-07-15 NOTE — ED ADULT NURSE NOTE - OBJECTIVE STATEMENT
Patient c/o of bilateral upper abdomen pain , sharp, intermittent, radiates to right flank, symptoms X 1 week w/ mild nausea, no vomitting, no diarhea, normal BM this morning, states constipated going every 3 days.  No dysuria, hematuria, no fevers.

## 2019-07-15 NOTE — ED PROVIDER NOTE - OBJECTIVE STATEMENT
60 yo male h/o HTN, DM II, CAD s/p 3vCABG at [facility] in 2008 (LIMA-LAD, SVG-OM, SVG-PDA), stent x2, R femoral artery thrombus s/p thrombectomy June, 2018 c/o 60 yo male h/o HTN, DM II, CAD s/p 3vCABG at [facility] in 2008 (LIMA-LAD, SVG-OM, SVG-PDA), stent x2, R femoral artery thrombus s/p thrombectomy June, 2018 c/o intermittent 7/10 epigastric pain radiating to bilat uq and lower abd unrelated to exertion, po's, position.  No h/o similar, no associated cp, palpitations, sob, change in bowel habits, black/bloody stools, h/o food intol.  No current pain, no etoh abuse, no associated gerd.  No h/o gallstone, pud.  Pt reports nl egd 8-9 yr ago.  No dysuria, hematuria.  No fever. Pt feel pulsating pain when present and feels he can't take a deep breath when pain severe, o/w no sob.  No h/o aortic issues.  No radiation to back.  No increased gerd sx.  No meds for relief for sx when present.

## 2019-07-30 ENCOUNTER — APPOINTMENT (OUTPATIENT)
Dept: GASTROENTEROLOGY | Facility: CLINIC | Age: 59
End: 2019-07-30
Payer: COMMERCIAL

## 2019-07-30 VITALS
RESPIRATION RATE: 14 BRPM | BODY MASS INDEX: 24.99 KG/M2 | HEIGHT: 64.88 IN | HEART RATE: 65 BPM | OXYGEN SATURATION: 98 % | WEIGHT: 150 LBS | SYSTOLIC BLOOD PRESSURE: 140 MMHG | DIASTOLIC BLOOD PRESSURE: 100 MMHG

## 2019-07-30 DIAGNOSIS — K85.20 ALCOHOL INDUCED ACUTE PANCREATITIS WITHOUT NECROSIS OR INFECTION: ICD-10-CM

## 2019-07-30 DIAGNOSIS — K59.00 CONSTIPATION, UNSPECIFIED: ICD-10-CM

## 2019-07-30 DIAGNOSIS — R10.9 UNSPECIFIED ABDOMINAL PAIN: ICD-10-CM

## 2019-07-30 PROCEDURE — 99204 OFFICE O/P NEW MOD 45 MIN: CPT

## 2019-07-30 NOTE — ASSESSMENT
[FreeTextEntry1] : 59yr old M with PMHx significant for HTN, CAD s/p 3vCABG at [facility] in 2008 (LIMA-LAD, SVG-OM, SVG-PDA), and recent PCI with RAJENDRA placement x2 6/11/2018, Dyslipidemia, DMII, PAD, who presents for post ED visit for abdominal pain - which was diagnosed as pancreatitis.\par \par # Abdominal pain -\par - likely 2/2 alcohol induced mild pancreatitis\par - pan is improved currently\par - advised to abstain from alcohol\par - will get H pylori breath test\par - if positive will Rx \par - no current indication for EGD at this time - no alarm symptoms\par \par # Constipation -\par - will start miralax 17g daily\par - monitor for improvement\par \par # Screening colonoscopy - \par - due for a screening colonoscopy\par - has cardiology clearance to stop plavix (Dr Morales) 7days before procedure\par - R/B/A/I discussed with patient and he is willing to proceed with procedure\par - high fibre diet\par - prep instructions will be given to patient\par \par RTC PRN

## 2019-07-30 NOTE — HISTORY OF PRESENT ILLNESS
[Nausea] : denies nausea [Heartburn] : stable heartburn [Vomiting] : denies vomiting [Constipation] : stable constipation [Diarrhea] : denies diarrhea [Yellow Skin Or Eyes (Jaundice)] : denies jaundice [Abdominal Pain] : improved abdominal pain [Abdominal Swelling] : denies abdominal swelling [Rectal Pain] : denies rectal pain [Wt Loss ___ Lbs] : no recent weight loss [Wt Gain ___ Lbs] : no recent weight gain [de-identified] : 59yr old M with PMHx significant for HTN, CAD s/p 3vCABG at [facility] in 2008 (LIMA-LAD, SVG-OM, SVG-PDA), and recent PCI with RAJENDRA placement x2 6/11/, Dyslipidemia, DMII, PAD, who presents for post ED visit for abdominal pain - which was diagnosed as pancreatitis.\par Patient reports that he developed some abdominal pain p2fzoty ago, upper quadrants, R>L, 7/10, intermittent, no identified aggravating factors, alleviated by tylenol and pepcid given in ED, he went to [place] and did have some alcohol use there, also states that he used to drink heavily on saturdays - 8 to 10beers, denies drug use, quit tobacco use 8-9yrs ago. No nausea or emesis, heartburn, mild regurgitation, fever, chills, jaundice, change in color of his eyes, pruritus. He reports that he was regular prior to the onset of the pain, but since then he has been having bowel movements every 3-4days now, hard stools, and has to strain to go. He did take some magnesium citrate a few days ago and was able to have a good bowel movement. Denies blood in the stool.\par \par EGD - maybe a long time ago\par Colonoscopy - also a long time ago (thinks maybe 10yrs ago)\par \par Denies FHx of stomach/pancreatic/esophageal/colon cancer, IBD.\par Mother passed from cirrhosis of liver - he is not sure of the etiology\par \par \par CT ABD/PELVIS - 7/15/2019\par Impression:\par 1. No acute intra-abdominal or pelvic pathology.\par 2. Unchanged soft tissue infiltration around the right common femoral artery at site of known occlusion. Occlusion not visualized without use of IV contrast.\par 3.  Mild bladder wall thickening and a cystitis and/or bladder outlet obstruction from a prominent sized prostate cannot be excluded.

## 2019-07-30 NOTE — END OF VISIT
[] : Fellow [Time Spent: ___ minutes] : I have spent [unfilled] minutes of face to face time with the patient [>50% of Time Spent on Counseling for ____] : Greater than 50% of the encounter time was spent on counseling for [unfilled]

## 2019-07-30 NOTE — PHYSICAL EXAM
[General Appearance - Alert] : alert [General Appearance - In No Acute Distress] : in no acute distress [General Appearance - Well Nourished] : well nourished [Sclera] : the sclera and conjunctiva were normal [Outer Ear] : the ears and nose were normal in appearance [Neck Appearance] : the appearance of the neck was normal [Exaggerated Use Of Accessory Muscles For Inspiration] : no accessory muscle use [Heart Sounds] : normal S1 and S2 [Bowel Sounds] : normal bowel sounds [Abdomen Soft] : soft [Abdomen Tenderness] : non-tender [] : no hepato-splenomegaly [Abdomen Mass (___ Cm)] : no abdominal mass palpated [Abnormal Walk] : normal gait [No Focal Deficits] : no focal deficits [Oriented To Time, Place, And Person] : oriented to person, place, and time

## 2019-08-02 LAB — UREA BREATH TEST QL: NEGATIVE

## 2019-08-30 ENCOUNTER — APPOINTMENT (OUTPATIENT)
Dept: GASTROENTEROLOGY | Facility: CLINIC | Age: 59
End: 2019-08-30
Payer: COMMERCIAL

## 2019-08-30 ENCOUNTER — RESULT REVIEW (OUTPATIENT)
Age: 59
End: 2019-08-30

## 2019-08-30 PROCEDURE — 45380 COLONOSCOPY AND BIOPSY: CPT | Mod: 59,33

## 2019-08-30 PROCEDURE — 45385 COLONOSCOPY W/LESION REMOVAL: CPT | Mod: 33

## 2020-01-23 ENCOUNTER — APPOINTMENT (OUTPATIENT)
Dept: HEART AND VASCULAR | Facility: CLINIC | Age: 60
End: 2020-01-23
Payer: COMMERCIAL

## 2020-01-23 VITALS
BODY MASS INDEX: 27.31 KG/M2 | DIASTOLIC BLOOD PRESSURE: 84 MMHG | OXYGEN SATURATION: 98 % | HEIGHT: 64 IN | SYSTOLIC BLOOD PRESSURE: 142 MMHG | WEIGHT: 160 LBS | HEART RATE: 66 BPM

## 2020-01-23 PROCEDURE — 93000 ELECTROCARDIOGRAM COMPLETE: CPT

## 2020-01-23 PROCEDURE — 99213 OFFICE O/P EST LOW 20 MIN: CPT

## 2020-01-23 NOTE — DISCUSSION/SUMMARY
[Coronary Artery Disease] : coronary artery disease [Exercise Regimen] : an exercise regimen [Dietary Modification] : dietary modification [Peripheral Vascular Disease] : peripheral vascular disease [Weight Reduction] : weight reduction [Medication Changes Per Orders] : as documented in orders [Exercise Rehab] : exercise rehabilitation [Stable] : stable

## 2020-01-23 NOTE — HISTORY OF PRESENT ILLNESS
[FreeTextEntry1] : 57yo male, CONTRAST DYE ALLERGY (HIVES), Former smoker with PMHx of HTN, DM II, \par CAD s/p 3vCABG at [facility] in 2008 (LIMA-LAD, SVG-OM, SVG-PDA), and recent PCI w/ \par RAJENDRA placement x2 on Monday 6/11, presenting 6/16 to [facility] ED with 1 day history \par of numbness, coolness, and exertional pain of his R foot. Patient reports he \par was walking his dog yesterday morning and after walking 1-2 blocks, he \par experienced acute onset R foot pain and heaviness, which improved with rest. \par The pain was from the mid calf down to the dorsum of his foot. He also noted \par sudden numbness and tingling from the ankle down to his foot, and he reports \par that the numbness and paresthesias have persisted since they started yesterday. \par Currently he denies rest pain or motor deficits. He denies previous \par claudication symptoms, previous history of peripheral arterial disease or \par previous interventions on his lower extremities other than R groin access for \par his staged PCI. Currently, pain well-controlled. Denies nausea, vomiting, chest \par pain, shortness of breath, fevers, or chills. s/p R femoral artery exploration, \par vangie catheter thrombectomy, patch repair, completion angiogram 6/16 His \par postoperative course was unremarkable with advancement of diet, passing trial \par of void, and pain control. \par \par of note was called by vascular as pt presented with claudication szx sent to OR for perclose fixure, endarterectomy and patch\par groin site stable\par no cp sob palp\par good pulses RLE\par \par 7/24/18 still c/o RLE numbness, and fatigue hypotension\par \par 4/10/19 USOH, 100% compliant with meds\par location: chest\par duration: na\par  modifying factors: na\par timing: na\par severity: 0/10\par EKG unchanged from prior (in chart)\par consultation notes reviewed where appropriate\par  \par 1/23/20\par Interval Symptoms: EKG today unchanged from prior (in chart)\par usual state of health, no new complaints\par SOB - no\par chest pain - no\par location: chest\par duration: none\par modifying factors: none\par timing: none\par severity: 0/10\par Medications: the patient is adherent with his medication regimen. denies medication side effects. \par Disease Management: the patient is doing well with goals.

## 2020-06-18 ENCOUNTER — APPOINTMENT (OUTPATIENT)
Dept: HEART AND VASCULAR | Facility: CLINIC | Age: 60
End: 2020-06-18
Payer: COMMERCIAL

## 2020-06-18 VITALS
HEART RATE: 66 BPM | BODY MASS INDEX: 27.12 KG/M2 | OXYGEN SATURATION: 99 % | SYSTOLIC BLOOD PRESSURE: 150 MMHG | WEIGHT: 158 LBS | DIASTOLIC BLOOD PRESSURE: 60 MMHG | TEMPERATURE: 98.5 F

## 2020-06-18 DIAGNOSIS — Z86.39 PERSONAL HISTORY OF OTHER ENDOCRINE, NUTRITIONAL AND METABOLIC DISEASE: ICD-10-CM

## 2020-06-18 DIAGNOSIS — R07.9 CHEST PAIN, UNSPECIFIED: ICD-10-CM

## 2020-06-18 DIAGNOSIS — Z86.79 PERSONAL HISTORY OF OTHER DISEASES OF THE CIRCULATORY SYSTEM: ICD-10-CM

## 2020-06-18 DIAGNOSIS — Z00.00 ENCOUNTER FOR GENERAL ADULT MEDICAL EXAMINATION W/OUT ABNORMAL FINDINGS: ICD-10-CM

## 2020-06-18 DIAGNOSIS — I25.10 ATHEROSCLEROTIC HEART DISEASE OF NATIVE CORONARY ARTERY W/OUT ANGINA PECTORIS: ICD-10-CM

## 2020-06-18 DIAGNOSIS — I73.9 PERIPHERAL VASCULAR DISEASE, UNSPECIFIED: ICD-10-CM

## 2020-06-18 PROCEDURE — 99214 OFFICE O/P EST MOD 30 MIN: CPT

## 2020-06-18 PROCEDURE — 93000 ELECTROCARDIOGRAM COMPLETE: CPT

## 2020-06-18 NOTE — DISCUSSION/SUMMARY
[New-Onset Angina] : new-onset angina [Anginal Equivalent] : anginal equivalent [Outpatient Evaluation] : outpatient evaluation [Echocardiogram] : echocardiogram [Possible Angioplasty] : possible angioplasty [Electron Beam CT] : electron beam CT [Multidetector Cardiac CT] : multidetector cardiac CT [Medication Changes Per Orders] : as documented in orders [Deteriorating] : deteriorating [Peripheral Vascular Disease] : peripheral vascular disease [FreeTextEntry1] : Vascular Surgery c/s Dr Martínez

## 2020-06-18 NOTE — PHYSICAL EXAM
[Normal Appearance] : normal appearance [General Appearance - Well Developed] : well developed [Well Groomed] : well groomed [General Appearance - Well Nourished] : well nourished [No Deformities] : no deformities [Normal Conjunctiva] : the conjunctiva exhibited no abnormalities [Eyelids - No Xanthelasma] : the eyelids demonstrated no xanthelasmas [General Appearance - In No Acute Distress] : no acute distress [No Oral Cyanosis] : no oral cyanosis [No Oral Pallor] : no oral pallor [Normal Oral Mucosa] : normal oral mucosa [Normal Jugular Venous A Waves Present] : normal jugular venous A waves present [Normal Jugular Venous V Waves Present] : normal jugular venous V waves present [Respiration, Rhythm And Depth] : normal respiratory rhythm and effort [No Jugular Venous Morgan A Waves] : no jugular venous morgan A waves [Auscultation Breath Sounds / Voice Sounds] : lungs were clear to auscultation bilaterally [Exaggerated Use Of Accessory Muscles For Inspiration] : no accessory muscle use [Heart Rate And Rhythm] : heart rate and rhythm were normal [Heart Sounds] : normal S1 and S2 [Abdomen Soft] : soft [Murmurs] : no murmurs present [Abnormal Walk] : normal gait [Abdomen Tenderness] : non-tender [Abdomen Mass (___ Cm)] : no abdominal mass palpated [Cyanosis, Localized] : no localized cyanosis [Gait - Sufficient For Exercise Testing] : the gait was sufficient for exercise testing [Nail Clubbing] : no clubbing of the fingernails [Skin Color & Pigmentation] : normal skin color and pigmentation [Petechial Hemorrhages (___cm)] : no petechial hemorrhages [] : no rash [No Venous Stasis] : no venous stasis [No Skin Ulcers] : no skin ulcer [No Xanthoma] : no  xanthoma was observed [Skin Lesions] : no skin lesions [Affect] : the affect was normal [No Anxiety] : not feeling anxious [Mood] : the mood was normal [Oriented To Time, Place, And Person] : oriented to person, place, and time

## 2020-06-18 NOTE — HISTORY OF PRESENT ILLNESS
[FreeTextEntry1] : 57yo male, CONTRAST DYE ALLERGY (HIVES), Former smoker with PMHx of HTN, DM II, \par CAD s/p 3vCABG at [facility] in 2008 (LIMA-LAD, SVG-OM, SVG-PDA), and recent PCI w/ \par RAJENDRA placement x2 on Monday 6/11, presenting 6/16 to [facility] ED with 1 day history \par of numbness, coolness, and exertional pain of his R foot. Patient reports he \par was walking his dog yesterday morning and after walking 1-2 blocks, he \par experienced acute onset R foot pain and heaviness, which improved with rest. \par The pain was from the mid calf down to the dorsum of his foot. He also noted \par sudden numbness and tingling from the ankle down to his foot, and he reports \par that the numbness and paresthesias have persisted since they started yesterday. \par Currently he denies rest pain or motor deficits. He denies previous \par claudication symptoms, previous history of peripheral arterial disease or \par previous interventions on his lower extremities other than R groin access for \par his staged PCI. Currently, pain well-controlled. Denies nausea, vomiting, chest \par pain, shortness of breath, fevers, or chills. s/p R femoral artery exploration, \par vangie catheter thrombectomy, patch repair, completion angiogram 6/16 His \par postoperative course was unremarkable with advancement of diet, passing trial \par of void, and pain control. \par \par of note was called by vascular as pt presented with claudication szx sent to OR for perclose fixure, endarterectomy and patch\par groin site stable\par no cp sob palp\par good pulses RLE\par \par 7/24/18 still c/o RLE numbness, and fatigue hypotension\par \par 4/10/19 USOH, 100% compliant with meds\par location: chest\par duration: na\par  modifying factors: na\par timing: na\par severity: 0/10\par EKG unchanged from prior (in chart)\par consultation notes reviewed where appropriate\par  \par 1/23/20\par Interval Symptoms: EKG today unchanged from prior (in chart)\par usual state of health, no new complaints\par SOB - no\par chest pain - no\par location: chest\par duration: none\par modifying factors: none\par timing: none\par severity: 0/10\par Medications: the patient is adherent with his medication regimen. denies medication side effects. \par Disease Management: the patient is doing well with goals. \par \par 6/18/20 subjective: no new complaints\par SOB - no\par chest pain - no\par location: chest\par duration: none\par modifying factors: none\par timing: none\par severity: 0/10

## 2020-06-30 ENCOUNTER — APPOINTMENT (OUTPATIENT)
Dept: VASCULAR SURGERY | Facility: CLINIC | Age: 60
End: 2020-06-30

## 2020-07-01 ENCOUNTER — APPOINTMENT (OUTPATIENT)
Dept: HEART AND VASCULAR | Facility: CLINIC | Age: 60
End: 2020-07-01
Payer: COMMERCIAL

## 2020-07-01 PROCEDURE — 93306 TTE W/DOPPLER COMPLETE: CPT

## 2020-07-02 ENCOUNTER — APPOINTMENT (OUTPATIENT)
Dept: CT IMAGING | Facility: HOSPITAL | Age: 60
End: 2020-07-02

## 2020-08-18 NOTE — PATIENT PROFILE ADULT. - NS SC CAGE ALCOHOL ANNOYED YOU
Hi all,     This [place] live in state [place] are unable to schedule virtual visits for patients outside of [place]. I advised him to follow up with his PCP to ask for referral to local endocrine provider to address his newly diagnosed diabetes.    Just a heads up.    Thanks,  Saba   no

## 2021-02-23 NOTE — H&P ADULT - NSHPSOCIALHISTORY_GEN_ALL_CORE
former smoker Bexarotene Pregnancy And Lactation Text: This medication is Pregnancy Category X and should not be given to women who are pregnant or may become pregnant. This medication should not be used if you are breast feeding.

## 2021-03-22 NOTE — PHYSICAL EXAM
[General Appearance - Well Developed] : well developed [Normal Appearance] : normal appearance [Well Groomed] : well groomed [General Appearance - Well Nourished] : well nourished [No Deformities] : no deformities [General Appearance - In No Acute Distress] : no acute distress [Normal Conjunctiva] : the conjunctiva exhibited no abnormalities [Eyelids - No Xanthelasma] : the eyelids demonstrated no xanthelasmas [Normal Oral Mucosa] : normal oral mucosa [No Oral Pallor] : no oral pallor [No Oral Cyanosis] : no oral cyanosis [Normal Jugular Venous A Waves Present] : normal jugular venous A waves present [Normal Jugular Venous V Waves Present] : normal jugular venous V waves present [No Jugular Venous Morgan A Waves] : no jugular venous morgan A waves [Respiration, Rhythm And Depth] : normal respiratory rhythm and effort [Exaggerated Use Of Accessory Muscles For Inspiration] : no accessory muscle use [Heart Rate And Rhythm] : heart rate and rhythm were normal [Auscultation Breath Sounds / Voice Sounds] : lungs were clear to auscultation bilaterally [Heart Sounds] : normal S1 and S2 [Murmurs] : no murmurs present [Abdomen Soft] : soft [Abdomen Tenderness] : non-tender [Abdomen Mass (___ Cm)] : no abdominal mass palpated [Abnormal Walk] : normal gait [Gait - Sufficient For Exercise Testing] : the gait was sufficient for exercise testing [Nail Clubbing] : no clubbing of the fingernails 23-Mar-2021 03:11 [Cyanosis, Localized] : no localized cyanosis [Petechial Hemorrhages (___cm)] : no petechial hemorrhages [Skin Color & Pigmentation] : normal skin color and pigmentation [] : no rash [No Venous Stasis] : no venous stasis [Skin Lesions] : no skin lesions [No Skin Ulcers] : no skin ulcer [No Xanthoma] : no  xanthoma was observed [Oriented To Time, Place, And Person] : oriented to person, place, and time [Affect] : the affect was normal [Mood] : the mood was normal [No Anxiety] : not feeling anxious

## 2021-05-12 NOTE — ED ADULT TRIAGE NOTE - HEIGHT IN INCHES
Pt was seen yesterday and ordered to start birth control  Pt's mother called informing script has not been sent to the pharmacy  [facility] [place]  [facility] on [address]  [address]

## 2021-05-28 NOTE — H&P ADULT - ASSESSMENT
51M, no significant pmh, presenting with flank pain. patient reports two days of right sided flank pain radiating to his front. pain feel similar to previous episode of kidney stones. no never, chest pain, shortness of breath, pain or burning with urination or blood in urine. 57yo male, CONTRAST DYE ALLERGY (HIVES), Former smoker with PMHx of HTN, DM II, CAD s/p 3vCABG at [facility] in 2008 (LIMA-LAD, SVG-OM, SVG-PDA) presents for staged PCI in setting of known residual CAD    ASA III Mallampati III  Precath consented  Took daily dose of ASA 81mg POx1 and Plavix 75mg POx1  Given IV Solu-cortef 200mg for contrast allergy and benadryl on call to cath  Started IVF NS @ 75cc/h    Risks & benefits of procedure and alternative therapy have been explained to the patient including but not limited to: allergic reaction, bleeding w/possible need for blood transfusion, infection, renal and vascular compromise, limb damage, arrhythmia, stroke, vessel dissection/perforation, Myocardial infarction, emergent CABG. Informed consent obtained and in chart.

## 2021-08-11 NOTE — DISCHARGE NOTE ADULT - NS MD DC PLAN IMMU FLU REF OTH
Out of season No Residual Tumor Seen Histology Text: There were no malignant cells seen in the sections examined.

## 2021-10-08 NOTE — ED ADULT NURSE NOTE - NS TRANSFER PATIENT BELONGINGS
Standard, routine drain care. Have pt change dressings as needed. If they are soaked then change. If they are dry then keep. Pt to keep her appointment as scheduled and then will evaluate at that time. Thanks.     Dr. Justin [employer]

## 2021-11-01 ENCOUNTER — INPATIENT (INPATIENT)
Facility: HOSPITAL | Age: 61
LOS: 1 days | Discharge: HOME | DRG: 246 | End: 2021-11-03
Attending: INTERNAL MEDICINE | Admitting: INTERNAL MEDICINE
Payer: COMMERCIAL

## 2021-11-01 VITALS
TEMPERATURE: 98 F | HEART RATE: 76 BPM | HEIGHT: 65 IN | DIASTOLIC BLOOD PRESSURE: 74 MMHG | WEIGHT: 160.06 LBS | OXYGEN SATURATION: 98 % | RESPIRATION RATE: 16 BRPM | SYSTOLIC BLOOD PRESSURE: 171 MMHG

## 2021-11-01 DIAGNOSIS — E11.9 TYPE 2 DIABETES MELLITUS WITHOUT COMPLICATIONS: ICD-10-CM

## 2021-11-01 DIAGNOSIS — Z95.1 PRESENCE OF AORTOCORONARY BYPASS GRAFT: Chronic | ICD-10-CM

## 2021-11-01 DIAGNOSIS — Z98.890 OTHER SPECIFIED POSTPROCEDURAL STATES: Chronic | ICD-10-CM

## 2021-11-01 DIAGNOSIS — N17.9 ACUTE KIDNEY FAILURE, UNSPECIFIED: ICD-10-CM

## 2021-11-01 DIAGNOSIS — I70.209 UNSPECIFIED ATHEROSCLEROSIS OF NATIVE ARTERIES OF EXTREMITIES, UNSPECIFIED EXTREMITY: Chronic | ICD-10-CM

## 2021-11-01 DIAGNOSIS — I10 ESSENTIAL (PRIMARY) HYPERTENSION: ICD-10-CM

## 2021-11-01 DIAGNOSIS — E78.5 HYPERLIPIDEMIA, UNSPECIFIED: ICD-10-CM

## 2021-11-01 DIAGNOSIS — I25.10 ATHEROSCLEROTIC HEART DISEASE OF NATIVE CORONARY ARTERY WITHOUT ANGINA PECTORIS: ICD-10-CM

## 2021-11-01 DIAGNOSIS — H93.11 TINNITUS, RIGHT EAR: ICD-10-CM

## 2021-11-01 DIAGNOSIS — I73.9 PERIPHERAL VASCULAR DISEASE, UNSPECIFIED: ICD-10-CM

## 2021-11-01 DIAGNOSIS — Z95.5 PRESENCE OF CORONARY ANGIOPLASTY IMPLANT AND GRAFT: Chronic | ICD-10-CM

## 2021-11-01 LAB
ALBUMIN SERPL ELPH-MCNC: 3.9 G/DL — SIGNIFICANT CHANGE UP (ref 3.3–5)
ALP SERPL-CCNC: 102 U/L — SIGNIFICANT CHANGE UP (ref 40–120)
ALT FLD-CCNC: 27 U/L — SIGNIFICANT CHANGE UP (ref 10–45)
ANION GAP SERPL CALC-SCNC: 6 MMOL/L — SIGNIFICANT CHANGE UP (ref 5–17)
APPEARANCE UR: CLEAR — SIGNIFICANT CHANGE UP
APTT BLD: 28.9 SEC — SIGNIFICANT CHANGE UP (ref 27.5–35.5)
AST SERPL-CCNC: 28 U/L — SIGNIFICANT CHANGE UP (ref 10–40)
BACTERIA # UR AUTO: PRESENT /HPF
BASOPHILS # BLD AUTO: 0.06 K/UL — SIGNIFICANT CHANGE UP (ref 0–0.2)
BASOPHILS NFR BLD AUTO: 0.6 % — SIGNIFICANT CHANGE UP (ref 0–2)
BILIRUB SERPL-MCNC: 0.2 MG/DL — SIGNIFICANT CHANGE UP (ref 0.2–1.2)
BILIRUB UR-MCNC: NEGATIVE — SIGNIFICANT CHANGE UP
BUN SERPL-MCNC: 23 MG/DL — SIGNIFICANT CHANGE UP (ref 7–23)
CALCIUM SERPL-MCNC: 8.4 MG/DL — SIGNIFICANT CHANGE UP (ref 8.4–10.5)
CHLORIDE SERPL-SCNC: 106 MMOL/L — SIGNIFICANT CHANGE UP (ref 96–108)
CK MB CFR SERPL CALC: 5.1 NG/ML — SIGNIFICANT CHANGE UP (ref 0–6.7)
CK MB CFR SERPL CALC: 7.1 NG/ML — HIGH (ref 0–6.7)
CK SERPL-CCNC: 256 U/L — HIGH (ref 30–200)
CK SERPL-CCNC: 332 U/L — HIGH (ref 30–200)
CO2 SERPL-SCNC: 26 MMOL/L — SIGNIFICANT CHANGE UP (ref 22–31)
COLOR SPEC: YELLOW — SIGNIFICANT CHANGE UP
CREAT ?TM UR-MCNC: 37 MG/DL — SIGNIFICANT CHANGE UP
CREAT SERPL-MCNC: 1.54 MG/DL — HIGH (ref 0.5–1.3)
DIFF PNL FLD: ABNORMAL
EOSINOPHIL # BLD AUTO: 0.29 K/UL — SIGNIFICANT CHANGE UP (ref 0–0.5)
EOSINOPHIL NFR BLD AUTO: 3 % — SIGNIFICANT CHANGE UP (ref 0–6)
EPI CELLS # UR: SIGNIFICANT CHANGE UP /HPF (ref 0–5)
GLUCOSE BLDC GLUCOMTR-MCNC: 200 MG/DL — HIGH (ref 70–99)
GLUCOSE SERPL-MCNC: 264 MG/DL — HIGH (ref 70–99)
GLUCOSE UR QL: 500
HCT VFR BLD CALC: 33.4 % — LOW (ref 39–50)
HGB BLD-MCNC: 11.1 G/DL — LOW (ref 13–17)
IMM GRANULOCYTES NFR BLD AUTO: 0.2 % — SIGNIFICANT CHANGE UP (ref 0–1.5)
INR BLD: 0.94 — SIGNIFICANT CHANGE UP (ref 0.88–1.16)
KETONES UR-MCNC: NEGATIVE — SIGNIFICANT CHANGE UP
LEUKOCYTE ESTERASE UR-ACNC: NEGATIVE — SIGNIFICANT CHANGE UP
LYMPHOCYTES # BLD AUTO: 1.18 K/UL — SIGNIFICANT CHANGE UP (ref 1–3.3)
LYMPHOCYTES # BLD AUTO: 12.3 % — LOW (ref 13–44)
MCHC RBC-ENTMCNC: 29.9 PG — SIGNIFICANT CHANGE UP (ref 27–34)
MCHC RBC-ENTMCNC: 33.2 GM/DL — SIGNIFICANT CHANGE UP (ref 32–36)
MCV RBC AUTO: 90 FL — SIGNIFICANT CHANGE UP (ref 80–100)
MONOCYTES # BLD AUTO: 0.63 K/UL — SIGNIFICANT CHANGE UP (ref 0–0.9)
MONOCYTES NFR BLD AUTO: 6.6 % — SIGNIFICANT CHANGE UP (ref 2–14)
NEUTROPHILS # BLD AUTO: 7.42 K/UL — HIGH (ref 1.8–7.4)
NEUTROPHILS NFR BLD AUTO: 77.3 % — HIGH (ref 43–77)
NITRITE UR-MCNC: NEGATIVE — SIGNIFICANT CHANGE UP
NRBC # BLD: 0 /100 WBCS — SIGNIFICANT CHANGE UP (ref 0–0)
OSMOLALITY UR: 282 MOSM/KG — LOW (ref 300–900)
PH UR: 6 — SIGNIFICANT CHANGE UP (ref 5–8)
PLATELET # BLD AUTO: 244 K/UL — SIGNIFICANT CHANGE UP (ref 150–400)
POTASSIUM SERPL-MCNC: 4.9 MMOL/L — SIGNIFICANT CHANGE UP (ref 3.5–5.3)
POTASSIUM SERPL-SCNC: 4.9 MMOL/L — SIGNIFICANT CHANGE UP (ref 3.5–5.3)
PROT ?TM UR-MCNC: 178 MG/DL — HIGH (ref 0–12)
PROT ?TM UR-MCNC: 178 MG/DL — HIGH (ref 0–12)
PROT SERPL-MCNC: 6.6 G/DL — SIGNIFICANT CHANGE UP (ref 6–8.3)
PROT UR-MCNC: 100 MG/DL
PROT/CREAT UR-RTO: 4.8 RATIO — HIGH (ref 0–0.2)
PROTHROM AB SERPL-ACNC: 11.3 SEC — SIGNIFICANT CHANGE UP (ref 10.6–13.6)
RBC # BLD: 3.71 M/UL — LOW (ref 4.2–5.8)
RBC # FLD: 12.5 % — SIGNIFICANT CHANGE UP (ref 10.3–14.5)
RBC CASTS # UR COMP ASSIST: < 5 /HPF — SIGNIFICANT CHANGE UP
SARS-COV-2 RNA SPEC QL NAA+PROBE: NEGATIVE — SIGNIFICANT CHANGE UP
SODIUM SERPL-SCNC: 138 MMOL/L — SIGNIFICANT CHANGE UP (ref 135–145)
SODIUM UR-SCNC: 68 MMOL/L — SIGNIFICANT CHANGE UP
SP GR SPEC: 1.01 — SIGNIFICANT CHANGE UP (ref 1–1.03)
TROPONIN T SERPL-MCNC: 0.07 NG/ML — CRITICAL HIGH (ref 0–0.01)
TROPONIN T SERPL-MCNC: 0.08 NG/ML — CRITICAL HIGH (ref 0–0.01)
UROBILINOGEN FLD QL: 0.2 E.U./DL — SIGNIFICANT CHANGE UP
UUN UR-MCNC: 286 MG/DL — SIGNIFICANT CHANGE UP
WBC # BLD: 9.6 K/UL — SIGNIFICANT CHANGE UP (ref 3.8–10.5)
WBC # FLD AUTO: 9.6 K/UL — SIGNIFICANT CHANGE UP (ref 3.8–10.5)
WBC UR QL: < 5 /HPF — SIGNIFICANT CHANGE UP

## 2021-11-01 PROCEDURE — 93010 ELECTROCARDIOGRAM REPORT: CPT

## 2021-11-01 PROCEDURE — 71045 X-RAY EXAM CHEST 1 VIEW: CPT | Mod: 26

## 2021-11-01 PROCEDURE — 99285 EMERGENCY DEPT VISIT HI MDM: CPT | Mod: 25

## 2021-11-01 RX ORDER — INFLUENZA VIRUS VACCINE 15; 15; 15; 15 UG/.5ML; UG/.5ML; UG/.5ML; UG/.5ML
0.5 SUSPENSION INTRAMUSCULAR ONCE
Refills: 0 | Status: DISCONTINUED | OUTPATIENT
Start: 2021-11-01 | End: 2021-11-03

## 2021-11-01 RX ORDER — DEXTROSE 50 % IN WATER 50 %
25 SYRINGE (ML) INTRAVENOUS ONCE
Refills: 0 | Status: DISCONTINUED | OUTPATIENT
Start: 2021-11-01 | End: 2021-11-03

## 2021-11-01 RX ORDER — METFORMIN HYDROCHLORIDE 850 MG/1
1 TABLET ORAL
Qty: 0 | Refills: 0 | DISCHARGE

## 2021-11-01 RX ORDER — SODIUM CHLORIDE 9 MG/ML
1000 INJECTION, SOLUTION INTRAVENOUS
Refills: 0 | Status: DISCONTINUED | OUTPATIENT
Start: 2021-11-01 | End: 2021-11-03

## 2021-11-01 RX ORDER — ENOXAPARIN SODIUM 100 MG/ML
40 INJECTION SUBCUTANEOUS
Qty: 0 | Refills: 0 | DISCHARGE

## 2021-11-01 RX ORDER — DEXTROSE 50 % IN WATER 50 %
15 SYRINGE (ML) INTRAVENOUS ONCE
Refills: 0 | Status: DISCONTINUED | OUTPATIENT
Start: 2021-11-01 | End: 2021-11-03

## 2021-11-01 RX ORDER — SODIUM CHLORIDE 9 MG/ML
1000 INJECTION INTRAMUSCULAR; INTRAVENOUS; SUBCUTANEOUS
Refills: 0 | Status: DISCONTINUED | OUTPATIENT
Start: 2021-11-01 | End: 2021-11-02

## 2021-11-01 RX ORDER — INSULIN GLARGINE 100 [IU]/ML
35 INJECTION, SOLUTION SUBCUTANEOUS
Qty: 0 | Refills: 0 | DISCHARGE

## 2021-11-01 RX ORDER — METOPROLOL TARTRATE 50 MG
50 TABLET ORAL DAILY
Refills: 0 | Status: DISCONTINUED | OUTPATIENT
Start: 2021-11-02 | End: 2021-11-03

## 2021-11-01 RX ORDER — HEPARIN SODIUM 5000 [USP'U]/ML
4100 INJECTION INTRAVENOUS; SUBCUTANEOUS EVERY 6 HOURS
Refills: 0 | Status: DISCONTINUED | OUTPATIENT
Start: 2021-11-01 | End: 2021-11-02

## 2021-11-01 RX ORDER — DEXTROSE 50 % IN WATER 50 %
12.5 SYRINGE (ML) INTRAVENOUS ONCE
Refills: 0 | Status: DISCONTINUED | OUTPATIENT
Start: 2021-11-01 | End: 2021-11-03

## 2021-11-01 RX ORDER — AMLODIPINE BESYLATE 2.5 MG/1
5 TABLET ORAL DAILY
Refills: 0 | Status: DISCONTINUED | OUTPATIENT
Start: 2021-11-01 | End: 2021-11-02

## 2021-11-01 RX ORDER — INSULIN GLARGINE 100 [IU]/ML
32 INJECTION, SOLUTION SUBCUTANEOUS AT BEDTIME
Refills: 0 | Status: DISCONTINUED | OUTPATIENT
Start: 2021-11-01 | End: 2021-11-03

## 2021-11-01 RX ORDER — GLUCAGON INJECTION, SOLUTION 0.5 MG/.1ML
1 INJECTION, SOLUTION SUBCUTANEOUS ONCE
Refills: 0 | Status: DISCONTINUED | OUTPATIENT
Start: 2021-11-01 | End: 2021-11-03

## 2021-11-01 RX ORDER — ATORVASTATIN CALCIUM 80 MG/1
80 TABLET, FILM COATED ORAL AT BEDTIME
Refills: 0 | Status: DISCONTINUED | OUTPATIENT
Start: 2021-11-01 | End: 2021-11-03

## 2021-11-01 RX ORDER — CLOPIDOGREL BISULFATE 75 MG/1
75 TABLET, FILM COATED ORAL DAILY
Refills: 0 | Status: DISCONTINUED | OUTPATIENT
Start: 2021-11-02 | End: 2021-11-03

## 2021-11-01 RX ORDER — HEPARIN SODIUM 5000 [USP'U]/ML
4100 INJECTION INTRAVENOUS; SUBCUTANEOUS ONCE
Refills: 0 | Status: COMPLETED | OUTPATIENT
Start: 2021-11-01 | End: 2021-11-01

## 2021-11-01 RX ORDER — HEPARIN SODIUM 5000 [USP'U]/ML
INJECTION INTRAVENOUS; SUBCUTANEOUS
Qty: 25000 | Refills: 0 | Status: DISCONTINUED | OUTPATIENT
Start: 2021-11-01 | End: 2021-11-02

## 2021-11-01 RX ORDER — ASPIRIN/CALCIUM CARB/MAGNESIUM 324 MG
81 TABLET ORAL DAILY
Refills: 0 | Status: DISCONTINUED | OUTPATIENT
Start: 2021-11-01 | End: 2021-11-03

## 2021-11-01 RX ORDER — METOPROLOL TARTRATE 50 MG
50 TABLET ORAL
Qty: 0 | Refills: 0 | DISCHARGE

## 2021-11-01 RX ORDER — INSULIN LISPRO 100/ML
VIAL (ML) SUBCUTANEOUS
Refills: 0 | Status: DISCONTINUED | OUTPATIENT
Start: 2021-11-01 | End: 2021-11-03

## 2021-11-01 RX ADMIN — HEPARIN SODIUM 4100 UNIT(S): 5000 INJECTION INTRAVENOUS; SUBCUTANEOUS at 18:54

## 2021-11-01 RX ADMIN — HEPARIN SODIUM 800 UNIT(S)/HR: 5000 INJECTION INTRAVENOUS; SUBCUTANEOUS at 18:55

## 2021-11-01 RX ADMIN — AMLODIPINE BESYLATE 5 MILLIGRAM(S): 2.5 TABLET ORAL at 18:36

## 2021-11-01 NOTE — ED ADULT NURSE NOTE - CAS EDN DISCHARGE ASSESSMENT
Verbal results taken from Radha at [facility].  PT/INR  35.0 / 2.9.  Date drawn 4/1/2020.  INR remains therapeutic.  No changes reported.  Orders given:  Continue current dose of warfarin 0.5mg every evening except on Tuesdays and Thursdays. Patient will not take any warfarin on Tuesdays and Thursdays.   Recheck INR on Wednesday, 4/15/2020.   
Alert and oriented to person, place and time

## 2021-11-01 NOTE — ED ADULT NURSE NOTE - OBJECTIVE STATEMENT
Patient presents to the ED complaining of chest pain on saturday and then again today, describes it as burning feeling. Patient with triple bypass in 2018 and 2 stents 2 years ago. Patient denies any chest pain at this time. No fever or chills or cough.

## 2021-11-01 NOTE — H&P ADULT - PROBLEM SELECTOR PLAN 5
-s/p LLE peripheral angiogram at [facility] with Dr. Kaiden Diane ([phone]) two weeks ago.   -Patient noted chronic right foot pain since acute limb event 2018  -Continue ASA, Plavix, Lipitor 80 mg daily.

## 2021-11-01 NOTE — H&P ADULT - PROBLEM SELECTOR PLAN 1
- Currently chest pain free. Last episode around 3 PM today while at work.   - Troponin T: 0.08, CKMB: 7.1, CK: 332. F/U 2nd troponin @ 8 PM.   - s/p 3vCABG at [facility] in 2008 (LIMA-LAD, SVG-OM, SVG-PDA). Most recent angiogram 6/11/2018 ([facility]) s/p rotoblator/RAJENDRA mRCA and RAJENDRA pRCA (complicated by acute limb s/p CFA exploratory sx).   -Initiate Heparin gtt (nomogram) for ACS protocol  -Plan for cardiac catheterization with possible intervention potentially tomorrow (pending renal function). Consent in 11th floor PA Office  -*Known Contrast allergy: If patient is for angiogram tomorrow with need IV premedication as discussed with Dr. Angulo  -Will continue home regimen: ASA 81 mg daily, Plavix 75 mg daily (remains compliant with DAPT Therapy). Lipitor 80 mg daily, Toprol Xl 50 mg daily. Norvasc 5 mg daily added to regimen this evening for BP control   - Prior Echo ([facility] 7/1/2020): Grade I diastolic dysfunction, LVEF 65-70%, trace PI, minimal TR.  -Echo ordered for AM   -F/U AM Lipid panel and Hemoglobin A1c

## 2021-11-01 NOTE — H&P ADULT - PROBLEM SELECTOR PLAN 7
-Patient reports one week of buzzing in right ear.   -Requesting ENT consultation; discuss further with Dr. Angulo in AM     Dispo: F/U Troponin @ 8 PM. Echo ordered for AM. Tentative plan for cardiac catheterization tomorrow pending renal function    Case reviewed with Dr. Angulo and patient updated at bedside.

## 2021-11-01 NOTE — H&P ADULT - PROBLEM SELECTOR PLAN 6
-BUN/CR: 23/1.54 on admission  -No history of CKD on prior labs in [place]   -Will give gentle hydration overnight (euvolemic on exam and CXR wet read clear).   -Holding home Losartan 50 mg daily  -UA and Urine lytes ordered.       Dispo: F/U Troponin @ 8 PM. Echo ordered for AM. Tentative plan for cardiac catheterization tomorrow pending renal function    Case reviewed with Dr. Angulo and patient updated at bedside. -BUN/CR: 23/1.54 on admission  -No history of CKD on prior labs in [place]   -Will give gentle hydration overnight: NS @ 75 cc/hour x six hours ordered (euvolemic on exam and CXR wet read clear).   -Holding home Losartan 50 mg daily  -UA and Urine lytes ordered.       Dispo: F/U Troponin @ 8 PM. Echo ordered for AM. Tentative plan for cardiac catheterization tomorrow pending renal function    Case reviewed with Dr. Angulo and patient updated at bedside. -BUN/CR: 23/1.54 on admission  -No history of CKD on prior labs in [place]   -Will give gentle hydration overnight: NS @ 75 cc/hour x six hours ordered (euvolemic on exam and CXR wet read clear).   -Holding home Losartan 50 mg daily  -UA and Urine lytes ordered.

## 2021-11-01 NOTE — H&P ADULT - PROBLEM SELECTOR PLAN 4
-F/U AM Hemoglobin A1c  -Home Lantus 40 Units qhS (reduced by 20% while admitted) to 32 units qHS  -Humalog Sliding Scale Coverage ordered, FS QID

## 2021-11-01 NOTE — ED ADULT NURSE NOTE - NSFALLRSKASSESASSIST_ED_ALL_ED
Chief Complaint   Patient presents with     Epistaxis     Pt is here for left nosebleeds.  Pt was seen 6/18/17 at the ER and a balloon was placed.     Joon presents for concerns regarding epistaxis. He has recurrent bleeds from his left nares for the last 2 weeks. He has been in the ED on 3 separate occasions. He has left sided bleeding only. Each epistaxis lasts for 10 minutes- 30 minutes. He trevon have blood dripping from his nose first.   6/13- he was seen in ED and reports he had waited >90 minutes and bleeding stopped. He had no treatment completed  6/18- attempted cautery without relief, and bleeding from other sites. They had attempted cautery at multiple sites without relief. He had bleeding continuing and rocket.   6/19- He did have nasal drip with blood- and presented to ED and re inflated balloon.       Patient does have HTN but currently under control.   No bleeding or clotting disorders.   He does have ASA 81 mg daily. He has been taking this daily, and no one informed him not to stop it.   No concerns with nasal trauma or injury.   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     No prior use of nasal cares. He does have CPAP and was using [place] Jelly. Recently started CPAP this winter.   Past Medical History:   Diagnosis Date     Diabetic neuropathy (H)      Gout      HTN (hypertension)      Other and unspecified hyperlipidemia      Pain in joint, shoulder region      Type 2 diabetes mellitus (H)         Allergies   Allergen Reactions     Simvastatin Cramps     Current Outpatient Prescriptions   Medication     HYDROcodone-acetaminophen (NORCO) 5-325 MG per tablet     Glucose Blood (BLOOD GLUCOSE TEST STRIPS) STRP     Lancets 30G MISC     lisinopril (PRINIVIL,ZESTRIL) 30 MG tablet     VITAMIN D, CHOLECALCIFEROL, PO     Omega-3 Fatty Acids (FISH OIL PO)     METFORMIN HCL ER PO     Glucos-Chondroit-Hyaluron-MSM (GLUCOSAMINE CHONDROITIN JOINT PO)     Multiple Vitamins-Minerals (MULTIVITAMIN PO)      "Atorvastatin Calcium (LIPITOR PO)     cetirizine (ZYRTEC) 10 MG tablet     allopurinol (ZYLOPRIM) 100 MG tablet     Aspirin (ASPIR-81 PO)     indomethacin (INDOCIN) 25 MG capsule     No current facility-administered medications for this visit.      Facility-Administered Medications Ordered in Other Visits   Medication     lactated ringers infusion      ROS: 10 point ROS neg other than the symptoms noted above in the HPI.    /66 (BP Location: Left arm, Cuff Size: Adult Large)  Pulse 89  Temp 98.4  F (36.9  C) (Tympanic)  Ht 5' 9\" (1.753 m)  Wt 231 lb (104.8 kg)  BMI 34.11 kg/m2  General - The patient is well nourished and well developed, and appears to have good nutritional status.  Alert and oriented to person and place, answers questions and cooperates with examination appropriately.   Head and Face - Normocephalic and atraumatic, with no gross asymmetry noted.  The facial nerve is intact, with strong symmetric movements.  Voice and Breathing - The patient was breathing comfortably without the use of accessory muscles. There was no wheezing, stridor, or stertor.  The patients voice was clear and strong, and had appropriate pitch and quality.  Ears -The external auditory canals are patent, the tympanic membranes are intact without effusion, retraction or mass.  Bony landmarks are intact.  Eyes - Extraocular movements intact, and the pupils were reactive to light.  Sclera were not icteric or injected, conjunctiva were pink and moist.  Mouth - Examination of the oral cavity showed pink, healthy oral mucosa. No lesions or ulcerations noted.  The tongue was mobile and midline, and the dentition were in good condition.    Throat - The walls of the oropharynx were smooth, pink, moist, symmetric, and had no lesions or ulcerations.  The tonsillar pillars and soft palate were symmetric.  The uvula was midline on elevation.    Neck - Normal midline excursion of the laryngotracheal complex during swallowing.  Full " range of motion on passive movement.  Palpation of the occipital, submental, submandibular, internal jugular chain, and supraclavicular nodes did not demonstrate any abnormal lymph nodes or masses.  Palpation of the thyroid was soft and smooth, with no nodules or goiter appreciated.  The trachea was mobile and midline.  Nose - External contour is symmetric, no gross deflection or scars. Rhino Rocket in left nares. Right nares with dried crusting blood. DNS      Nasal endoscopy, cautery and Packing  To further evaluate the nasal cavity, I performed rigid nasal endoscopy.  I first sprayed the nasal cavity bilaterally with a mix of lidocaine and neosynephrine.  I then began on the right side using a 2.7mm, 30 degree rigid nasal endoscope.  The septum intact.  No abnormal secretions, purulence, or polyps were noted. The middle turbinate and middle meatus were clearly visualized and normal in appearance.  Looking up, the olfactory cleft was unobstructed.  Going further back, the sphenoethmoid recess was normal in appearance, with healthy appearing mucosa on the face of the sphenoid.  The nasopharynx was unremarkable, and the eustachian tube opening on this side was unobstructed.     I then turned my attention to the left side.  1% lidocaine with 1:100,00 epinephrine used to anesthetize the left septum at site of hemmorhage.  Suction cautery applied until no further bleeding. There were 3 separate sites of bleeding. Each site had suction cautery applied setting at 10.   There was inferior floor artery with scant oozing. However, patient did not tolerate last site cautery due to vasovagal symptoms. He was placed supine with oxygen, cold packs and symptoms resolved.   Nasopore was placed after patient's symptoms resolved.   He was observed for >20 minutes without further bleeding.   No abnormal secretions, purulence, polyps were noted.  The right middle turbinate and middle meatus were clearly visualized and normal in  appearance.  Looking up, the olfactory cleft was unobstructed.  Going further back the right sphenoethmoid recess was normal in appearance, and eustachian tube opening was unobstructed.   bilateral  inferior turbinate hypertrophy      Nasopore soaked in bacitracin placed against left cautery site     He tolerated this well following vasovagal symptoms.                ASSESSMENT:    ICD-10-CM    1. Epistaxis R04.0 sodium chloride (OCEAN) 0.65 % nasal spray     Emollient (AQUAPHOR ADVANCED THERAPY) OINT     amoxicillin (AMOXIL) 500 MG capsule     HYDROcodone-acetaminophen (NORCO) 7.5-325 MG per tablet   2. Deviated nasal septum J34.2 sodium chloride (OCEAN) 0.65 % nasal spray     Emollient (AQUAPHOR ADVANCED THERAPY) OINT     amoxicillin (AMOXIL) 500 MG capsule     HYDROcodone-acetaminophen (NORCO) 7.5-325 MG per tablet   3. LULY on CPAP G47.33        Discussed nasal cares with Joon.  Follow post cautery cares.   Start nasal saline, Aquaphor  Start oral Amoxil TID due to recent rocket and current packing.     If further bleeding, he may require suction cautery in OR, as patient tolerated only fair in clinic (vasovagal).  I am hopeful the site was cauterized to decrease flow/ frequency at this time. Informed patient he may still bleed, but hopefully improved.      The patient has been cauterized today for epistaxis.  I counseled them on keeping the head above the level of the heart at all times for the next week.  No picking or rubbing at the cauterized side of the nose, or blowing for 1 week.    The patient has been treated today for epistaxis.  I spent the rest of the visit educating the patient on precautions to try and prevent re-bleeding.  This included strict restrictions on nose-blowing, manipulation, picking, and open-mouth sneezing.  Also, sleeping with the head elevated, and avoidance of strenuous activity, heavy lifting, and bending over were discussed.        Emily Seay PA-C  ENT  [facility],  [place]  [phone]     no

## 2021-11-01 NOTE — H&P ADULT - NSICDXPASTSURGICALHX_GEN_ALL_CORE_FT
PAST SURGICAL HISTORY:  H/O vascular surgery R femoral thrombectomy    Occlusion of femoral artery     S/P CABG x 3     S/P coronary artery stent placement

## 2021-11-01 NOTE — ED ADULT TRIAGE NOTE - CHIEF COMPLAINT QUOTE
Pt co chest pain x40 minutes radiating to L shoulder. Pt has a hx of triple bypass 2008 and 2 stent placements in 2019. EKG in prog.

## 2021-11-01 NOTE — H&P ADULT - HISTORY OF PRESENT ILLNESS
Cardiologist: Dr. Garcia  Vaccination Status:   Pharmacy:     57yo male, CONTRAST DYE ALLERGY (HIVES), Former smoker with PMHx of HTN, DM II, known CAD s/p 3vCABG at [facility] in 2008 (LIMA-LAD, SVG-OM, SVG-PDA), followed by multiple PCIs, most recent 6/11/2018 s/p rotoblator/RAJENDRA mRCA and RAJENDRA pRCA (s/p RCFA perclosed. TVP placed via RFV complicated by acute limb ischemia s/p R femoral artery exploration, thrombectomy, patch repair with vascular team 6/16/2021 presented to [facility] ED (11/1/2021) with the complaint of intermittent left sided chest pain.     Pertinent values include H/H: 11.1/33.4, BUN/CR: 23/1.54, Glucose: 264, BNP: 454, Troponin T: 0.08, CKMB: 7.1, CK: 332. COVID-19 PCR negative. Wet read of Frontal CXR no significant congestion and/or infiltrate. Vital signs on arrival to ED: BP: 171/74, HR: 70, RR: 16, O2: 98% RA. 12 Lead EKG revealed sinus Rhythm @ 64 BPM with no acute changes.     Echo (7/1/2021): Grade I diastolic dysfunction, LVEF 65-70%, trace PI, minimal TR   57yo male, CONTRAST DYE ALLERGY (HIVES), Former smoker with PMHx of HTN, DM II, known CAD s/p 3vCABG at [facility] in 2008 (LIMA-LAD, SVG-OM, SVG-PDA), followed by multiple PCIs, most recent 6/11/2018 ([facility]) s/p rotoblator/RAJENDRA mRCA and RAJENDRA pRCA (s/p Right perclose, TVP placed in RFV) complicated by acute limb ischemia s/p R femoral artery exploration, thrombectomy, patch repair with vascular team 6/16/2021, known PAD s/p prior intervention; most recent LLE Intervention with Dr. Kaiden Diane two weeks ago @ [facility] presented to [facility] ED (11/1/2021) with the complaint of three episodes of substernal chest burning associated with mild nausea, dizziness and SOB over the last five days. Symptoms last 2-3 minutes and self-subside. On Saturday, chest discomfort occurred while pushing shopping cart and today’s episode occurred while at work ( in a [employer]). Patient denies syncope, leg edema, PND, orthopnea, abdominal pain. Patient notes since his acute limb ischemia event after cardiac catheterization in 2018 has chronic right foot pain which prompted his peripheral angiogram two weeks prior (he thinks he received an intervention). Patient also endorses a "buzzing" sound in his right ear that is constant at night and in the early AM and resolved as day progresses.     Pertinent values include H/H: 11.1/33.4, BUN/CR: 23/1.54, Glucose: 264, BNP: 454, Troponin T: 0.08, CKMB: 7.1, CK: 332. COVID-19 PCR negative. Wet read of Frontal CXR no significant congestion and/or infiltrate. Vital signs on arrival to ED: BP: 171/74, HR: 70, RR: 16, O2: 98% RA. 12 Lead EKG revealed sinus Rhythm @ 64 BPM with no acute changes.  Prior Echo (Allscripts 7/1/2020): Grade I diastolic dysfunction, LVEF 65-70%, trace PI, minimal TR. Patient is now admitted to cardiology service for further ischemic evaluation.       Vaccination Status: QoL Meds    Pharmacy:     57yo male, CONTRAST DYE ALLERGY (HIVES), Former smoker with PMHx of HTN, DM II, known CAD s/p 3vCABG at [facility] in 2008 (LIMA-LAD, SVG-OM, SVG-PDA), followed by multiple PCIs, most recent 6/11/2018 ([facility]) s/p rotoblator/RAJENDRA mRCA and RAJENDRA pRCA (s/p Right perclose, TVP placed in RFV) complicated by acute limb ischemia s/p R femoral artery exploration, thrombectomy, patch repair with vascular team 6/16/2021, known PAD s/p prior intervention; most recent LLE Intervention with Dr. Kaiden Diane two weeks ago @ [facility] presented to [facility] ED (11/1/2021) with the complaint of three episodes of substernal chest burning associated with mild nausea, dizziness and SOB over the last five days. Symptoms last 2-3 minutes and self-subside. On Saturday, chest discomfort occurred while pushing shopping cart and today’s episode occurred while at work ( in a [employer]). Patient denies syncope, leg edema, PND, orthopnea, abdominal pain. Patient notes since his acute limb ischemia event after cardiac catheterization in 2018 has chronic right foot pain which prompted his peripheral angiogram two weeks prior (he thinks he received an intervention). Patient also endorses a "buzzing" sound in his right ear that is constant at night and in the early AM and resolved as day progresses.     Pertinent values include H/H: 11.1/33.4, BUN/CR: 23/1.54, Glucose: 264, BNP: 454, Troponin T: 0.08, CKMB: 7.1, CK: 332. COVID-19 PCR negative. Wet read of Frontal CXR no significant congestion and/or infiltrate. Vital signs on arrival to ED: BP: 171/74, HR: 70, RR: 16, O2: 98% RA. 12 Lead EKG revealed sinus Rhythm @ 64 BPM with TWI III.  Prior Echo (Allscripts 7/1/2020): Grade I diastolic dysfunction, LVEF 65-70%, trace PI, minimal TR. Patient is now admitted to cardiology service for ACS management and cardiac catheterization with possible intervention if clinically indicated.      Vaccination Status: Pfizer      59yo male, CONTRAST DYE ALLERGY (HIVES), Former smoker with PMHx of HTN, DM II, known CAD s/p 3vCABG at [facility] in 2008 (LIMA-LAD, SVG-OM, SVG-PDA), followed by multiple PCIs, most recent 6/11/2018 ([facility]) s/p rotoblator/RAJENDRA mRCA and RAJENDRA pRCA (s/p Right perclose, TVP placed in RFV) complicated by acute limb ischemia s/p R femoral artery exploration, thrombectomy, patch repair with vascular team 6/16/2021, known PAD s/p prior intervention; most recent LLE Intervention with Dr. Kaiden Diane two weeks ago @ [facility] presented to [facility] ED (11/1/2021) with the complaint of three episodes of substernal chest burning associated with mild nausea, dizziness and SOB over the last five days. Symptoms last 2-3 minutes and self-subside. On Saturday, chest discomfort occurred while pushing shopping cart and today’s episode occurred while at work ( in a [employer]). Patient denies syncope, leg edema, PND, orthopnea, abdominal pain. Patient notes since his acute limb ischemia event after cardiac catheterization in 2018 has chronic right foot pain which prompted his peripheral angiogram two weeks prior (he thinks he received an intervention). Patient also endorses a "buzzing" sound in his right ear that is constant at night and in the early AM and resolved as day progresses.     Pertinent values include H/H: 11.1/33.4, BUN/CR: 23/1.54, Glucose: 264, BNP: 454, Troponin T: 0.08, CKMB: 7.1, CK: 332. COVID-19 PCR negative. Wet read of Frontal CXR no significant congestion and/or infiltrate. Vital signs on arrival to ED: BP: 171/74, HR: 70, RR: 16, O2: 98% RA. 12 Lead EKG revealed sinus Rhythm @ 64 BPM with TWI III.  Prior Echo (Allscripts 7/1/2020): Grade I diastolic dysfunction, LVEF 65-70%, trace PI, minimal TR. Patient is now admitted to cardiology service for ACS management and cardiac catheterization with possible intervention if clinically indicated.      Vaccination Status: Pfizer      57yo male, CONTRAST DYE ALLERGY (HIVES), Former smoker with PMHx of HTN, DM II, known CAD s/p 3vCABG at [facility] in 2008 (LIMA-LAD, SVG-OM, SVG-PDA), followed by multiple PCIs, most recent 6/11/2018 ([facility]) s/p rotoblator/RAJENDRA mRCA and RAJENDRA pRCA (s/p Right perclose, TVP placed in RFV) complicated by acute limb ischemia s/p R femoral artery exploration, thrombectomy, patch repair with vascular team 6/16/2021, known PAD s/p prior intervention; most recent LLE Intervention with Dr. Kaiden Diane two weeks ago @ [facility] presented to [facility] ED (11/1/2021) with the complaint of three episodes of substernal chest burning associated with mild nausea, dizziness and SOB over the last five days. Symptoms last 2-3 minutes and self-subside. On Saturday, chest discomfort occurred while pushing shopping cart and today’s episode occurred while at work ( in a [employer]). Patient denies syncope, leg edema, PND, orthopnea, abdominal pain. Patient notes since his acute limb ischemia event after cardiac catheterization in 2018 has chronic right foot pain which prompted his peripheral angiogram two weeks prior (he thinks he received an intervention). Patient also endorses a "buzzing" sound in his right ear that is constant at night and in the early AM and resolved as day progresses. Denies any trauma to area.     Pertinent values include H/H: 11.1/33.4, BUN/CR: 23/1.54, Glucose: 264, BNP: 454, Troponin T: 0.08, CKMB: 7.1, CK: 332. COVID-19 PCR negative. Wet read of Frontal CXR no significant congestion and/or infiltrate. Vital signs on arrival to ED: BP: 171/74, HR: 70, RR: 16, O2: 98% RA. 12 Lead EKG revealed sinus Rhythm @ 64 BPM with TWI III.  Prior Echo (Allscripts 7/1/2020): Grade I diastolic dysfunction, LVEF 65-70%, trace PI, minimal TR. Patient is now admitted to cardiology service for ACS management and cardiac catheterization with possible intervention if clinically indicated.      Vaccination Status: Pfizer    Meds confirmed with patient at bedside (able to verbally recall; uses a mail-order pharmacy--can't recall name)    59yo male, CONTRAST DYE ALLERGY (HIVES), Former smoker with PMHx of HTN, DM II, known CAD s/p 3vCABG at [facility] in 2008 (LIMA-LAD, SVG-OM, SVG-PDA), followed by multiple PCIs, most recent 6/11/2018 ([facility]) s/p rotoblator/RAJENDRA mRCA and RAJENDRA pRCA (s/p Right perclose, TVP placed in RFV) complicated by acute limb ischemia s/p R femoral artery exploration, thrombectomy, patch repair with vascular team 6/16/2021, known PAD s/p prior intervention; most recent LLE Intervention with Dr. Kaiden Diane two weeks ago @ [facility] presented to [facility] ED (11/1/2021) with the complaint of three episodes of substernal chest burning associated with mild nausea, dizziness and SOB over the last five days. Symptoms last 2-3 minutes and self-subside. On Saturday, chest discomfort occurred while pushing shopping cart and today’s episode occurred while at work ( in a [employer]). Patient denies syncope, leg edema, PND, orthopnea, abdominal pain. Patient notes since his acute limb ischemia event after cardiac catheterization in 2018 has chronic right foot pain which prompted his peripheral angiogram two weeks prior (he thinks he received an intervention). Patient also endorses a "buzzing" sound in his right ear that is constant at night and in the early AM and resolved as day progresses. Denies any trauma to area.     Pertinent values include H/H: 11.1/33.4, BUN/CR: 23/1.54, Glucose: 264, BNP: 454, Troponin T: 0.08, CKMB: 7.1, CK: 332. COVID-19 PCR negative. Wet read of Frontal CXR no significant congestion and/or infiltrate. Vital signs on arrival to ED: BP: 171/74, HR: 70, RR: 16, O2: 98% RA. 12 Lead EKG revealed sinus Rhythm @ 64 BPM with TWI III.  Prior Echo (Allscripts 7/1/2020): Grade I diastolic dysfunction, LVEF 65-70%, trace PI, minimal TR. Patient is now admitted to cardiology service for ACS management and cardiac catheterization with possible intervention if clinically indicated.      Vaccination Status: Pfizer    Meds confirmed with patient at bedside (able to verbally recall; uses a mail-order pharmacy--can't recall name)    62 yo male, CONTRAST DYE ALLERGY (HIVES), Former smoker with PMHx of HTN, DM II, known CAD s/p 3vCABG at [facility] in 2008 (LIMA-LAD, SVG-OM, SVG-PDA), followed by multiple PCIs, most recent 6/11/2018 ([facility]) s/p rotoblator/RAJENDRA mRCA and RAJENDRA pRCA (s/p Right perclose, TVP placed in RFV) complicated by acute limb ischemia s/p R femoral artery exploration, thrombectomy, patch repair with vascular team 6/16/2021, known PAD s/p prior intervention; most recent LLE Intervention with Dr. Kaiden Diane two weeks ago @ [facility] presented to [facility] ED (11/1/2021) with the complaint of three episodes of substernal chest burning associated with mild nausea, dizziness and SOB over the last five days. Symptoms last 2-3 minutes and self-subside. On Saturday, chest discomfort occurred while pushing shopping cart and today’s episode occurred while at work ( in a [employer]). Patient denies syncope, leg edema, PND, orthopnea, abdominal pain. Patient notes since his acute limb ischemia event after cardiac catheterization in 2018 has chronic right foot pain which prompted his peripheral angiogram two weeks prior (he thinks he received an intervention). Patient also endorses a "buzzing" sound in his right ear that is constant at night and in the early AM and resolved as day progresses. Denies any trauma to area.     Pertinent values include H/H: 11.1/33.4, BUN/CR: 23/1.54, Glucose: 264, BNP: 454, Troponin T: 0.08, CKMB: 7.1, CK: 332. COVID-19 PCR negative. Wet read of Frontal CXR no significant congestion and/or infiltrate. Vital signs on arrival to ED: BP: 171/74, HR: 70, RR: 16, O2: 98% RA. 12 Lead EKG revealed sinus Rhythm @ 64 BPM with TWI III.  Prior Echo (Allscripts 7/1/2020): Grade I diastolic dysfunction, LVEF 65-70%, trace PI, minimal TR. Patient is now admitted to cardiology service for ACS management and cardiac catheterization with possible intervention if clinically indicated.

## 2021-11-01 NOTE — ED PROVIDER NOTE - OBJECTIVE STATEMENT
61 M co  left sided cp- pmh cad stent on asa/plavix, htn, dm, pvd w claudication- herman in prog- co burning sensation for about 5 minutes to L side of his chest w mild sob- sx lasted about 5 minutes- had a similar mild sx 2 days ago  no f/c no n/v  no radiation of the pain- px rested and the pain improved  mild-mod severity

## 2021-11-01 NOTE — H&P ADULT - ASSESSMENT
57yo male, CONTRAST DYE ALLERGY (HIVES), Former smoker with PMHx of HTN, DM II, known CAD s/p 3vCABG at [facility] in 2008 (LIMA-LAD, SVG-OM, SVG-PDA), followed by multiple PCIs, most recent 6/11/2018 ([facility]) s/p rotoblator/RAJENDRA mRCA and RAJENDRA pRCA (s/p Right perclose, TVP placed in RFV) complicated by acute limb ischemia s/p R femoral artery exploration, thrombectomy, patch repair with vascular team 6/16/2021, known PAD s/p prior intervention; most recent LLE Intervention with Dr. Kaiden Diane two weeks ago @ [facility] presented to [facility] ED (11/1/2021) with unstable angina with admission to [facility] for ACS management and cardiac catheterization with possible intervention if clinically indicated tomorrow AM pending renal function.     Risks & benefits of procedure and alternative therapy have been explained to the patient including but not limited to: allergic reaction, bleeding w/possible need for blood transfusion, infection, renal and vascular compromise, limb damage, arrhythmia, stroke, vessel dissection/perforation, Myocardial infarction, emergent CABG. Informed consent obtained and in chart.  60 yo male, CONTRAST DYE ALLERGY (HIVES), Former smoker with PMHx of HTN, DM II, known CAD s/p 3vCABG at [facility] in 2008 (LIMA-LAD, SVG-OM, SVG-PDA), followed by multiple PCIs, most recent 6/11/2018 ([facility]) s/p rotoblator/RAJENDRA mRCA and RAJENDRA pRCA (s/p Right perclose, TVP placed in RFV) complicated by acute limb ischemia s/p R femoral artery exploration, thrombectomy, patch repair with vascular team 6/16/2021, known PAD s/p prior intervention; most recent LLE Intervention with Dr. Kaiden Diane two weeks ago @ [facility] presented to [facility] ED (11/1/2021) with unstable angina with admission to [facility] for ACS management and cardiac catheterization with possible intervention if clinically indicated tomorrow AM pending renal function.     Risks & benefits of procedure and alternative therapy have been explained to the patient including but not limited to: allergic reaction, bleeding w/possible need for blood transfusion, infection, renal and vascular compromise, limb damage, arrhythmia, stroke, vessel dissection/perforation, Myocardial infarction, emergent CABG. Informed consent obtained and in chart.

## 2021-11-01 NOTE — PATIENT PROFILE ADULT - NUMBER OF YRS
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   • GERD (gastroesophageal reflux disease)       Past Surgical History:   Procedure Laterality Date   • Colonoscopy  11/12/2019   • Egd  11/12/2019   • No past surgeries        Social History     Socioeconomic History   • Marital status: Single     Spouse name: Not on file   • Number of children: 0   • Years of education: Not on file   • Highest education level: Not on file   Occupational History   • Occupation: nurse   Social Needs   • Financial resource strain: Not on file   • Food insecurity:     Worry: Not on file     Inability: Not on file   • Transportation needs:     Medical: Not on file     Non-medical: Not on file   Tobacco Use   • Smoking status: Never Smoker   • Smokeless tobacco: Never Used   Substance and Sexual Activity   • Alcohol use: Not Currently   • Drug use: Never   • Sexual activity: Not Currently   Lifestyle   • Physical activity:     Days per week: 4 days     Minutes per session: 100 min   • Stress: Only a little   Relationships   • Social connections:     Talks on phone: Not on file     Gets together: Not on file     Attends Worship service: Not on file     Active member of club or organization: Not on file     Attends meetings of clubs or organizations: Not on file     Relationship status: Not on file   • Intimate partner violence:     Fear of current or ex partner: Not on file     Emotionally abused: Not on file     Physically abused: Not on file     Forced sexual activity: Not on file   Other Topics Concern   • Not on file   Social History Narrative   • Not on file      Family History   Problem Relation Age of Onset   • Hypertension Mother    • Kidney disease Mother    •  Myocardial Infarction Father    • Kidney disease Father    • Diabetes Father       ALLERGIES:  Penicillin g           MEDICATIONS:    (Not in a hospital admission)     Current Outpatient Medications   Medication Sig Dispense Refill   • esomeprazole (NEXIUM) 40 MG capsule Take 40 mg by mouth daily (before breakfast).       Current Facility-Administered Medications   Medication Dose Route Frequency Provider Last Rate Last Dose   • sodium chloride 0.9% infusion   Intravenous Continuous Amanuel Yepez MD            VITAL SIGNS:    Visit Vitals  Ht 5' 3.98\" (1.625 m)   Wt 63.2 kg (139 lb 5.3 oz)   LMP 2019 (Exact Date)   BMI 23.93 kg/m²        No intake or output data in the 24 hours ending 20 0942     PHYSICAL EXAM:    General: The patient is well developed, well nourished, in no acute distress, appears stated age.   Neurologic: Alert. Normal mood and affect, normal speech, no gross tremor.  Skin: Warm and dry, normal turgor.  Head: Normocephalic.  Eyes: Normal conjunctivae and sclerae.  Pupils equal, round, reactive to light.  Extraocular movements intact.  HEENT: Oral pharynx clear, moist mucous membranes, external nose is normal to inspection.  Neck: Symmetric without swelling or tenderness.   Respiratory: Respiratory effort normal.   Cardiovascular: Regular rate and rhythm.  Extremities: No swelling or tenderness.  Gastrointestinal:  Soft and nontender.  Normal bowel sounds.  No hepatomegaly or splenomegaly. No clinical evidence of ascitis. Bowel sounds normpactive. No appreciable Hernia.    LABORATORY DATA:    No visits with results within 1 Day(s) from this visit.   Latest known visit with results is:   Office Visit on 2019   Component Date Value Ref Range Status   • PAPHPV w Genotype Pats over 30 2019    Final                    Value:Name: SE CLARK            MRN:     [id number]    :  1973                     Visit#:  [id number]                             Gynecologic Cytology Consultation Report        Client: [facility] [id number] [address]        Date Specimen Collected: 11/18/19            Accession #:  [id number]    Date Specimen Received:  11/19/19            Requisition #:[id number]    Date Reported:           11/25/2019 10:35    Location:     [facility]                            * Addendum Present *    ______________________________________________________________________________    Cytologic Interpretation :        Negative for intraepithelial lesion or malignancy.          Satisfactory for evaluation.  Presence of endocervical/transformation zone    component.            John Paladino, CT(ASCP)    ** Electronic Signature (JRP) 11/25/2019   10:35 **        Educational note:  The Pap test is a screening test with a well-recognized    false negative rate.  The best means avai                          lable to lower the false negative    rate and to detect early cervical lesions is a Pap test at regular intervals.     All ThinPrep Paps will be reviewed with the aid of the ThinPrep Imaging    System, unless otherwise specified.        ______________________________________________________________________________    Clinical Information:    LMP: [id number]    Other Clinical Conditions:Pap source: Cervical, Endocerv    REASON FOR COLLECTION::LOW RISK - ENCOUNTER FOR SCREENING FOR MALIGNANT    NEOPLASM OF CERVIX Z12.4    SOURCE::CERVICAL-ENDOCERVICAL        Specimen(s) Submitted:     PAP and High Risk HPV with Reflex to 16/18 HPV Genotyping        Procedures/Addenda:    HPV, High Risk_IL:    Date Ordered:     11/19/2019     Date Reported:11/20/2019        Interpretation    Aptima HPV HIGH RISK (TYPES 16,18,31,33,35,39,45,51,52,56,58,59,66,68):    NEGATIVE        The APTIMA HPV Assay is an FDA approved in vitro nucleic acid amplification    test for the qualitative detection of E6/E7 viral                           messenger RNA (mRNA) from 14    high-risk types  of human papillomavirus (HPV) in cervical specimens. The    high-risk HPV types detected by the assay include: 16, 18, 31, 33, 35, 39, 45,    51, 52, 56, 58, 59, 66, and 68. The Aptima HPV Assay does not discriminate    between the 14 high-risk types. Cervical specimens in the Thin Prep Pap Test    vials containing PreservCyt Solution and collected with broom-type or    cytobrush/spatula collection devices may be tested with this assay using the    Se[employer] ReceiptsHER System.         The APTIMA HPV Assay is intended to screen women 21 years and older with    atypical squamous cells of undetermined significance (ASC-US) cervical    cytology results to determine the need for referral to colposcopy. Test    results are not intended to prevent women from proceeding to colposcopy.         In women 30 years and older, the above assay can be used with cervical    cytology to adjunctively screen to assess the presence or absence of high-risk    HPV types. T                          his information, with the physician's assessment of cytology    history, other risk factors, and guidelines, may be used to guide patient    management.                                         ** Electronic Signature ** (MXM) 11/20/2019 11:51 **                ICD Codes:     Z01.419 Z12.4 Z11.51        Fee Codes:     A: [id number]     HPV_IL: [id number]        Performing Lab Location (Unless otherwise specified):    [facility] [address] [address]            IMAGING STUDIES:    EGD    (Results Pending)          ASSESSMENT:     1. Patient for GI endoscopy.        PLAN:    1. GI endoscopy to be undertaken.        Thank you for allowing us the opportunity of participating in your patient's care.    Amanuel Yepez MD   2/13/2020        30

## 2021-11-01 NOTE — H&P ADULT - PROBLEM SELECTOR PLAN 2
-SBP: 170-180 mmHG on arrival, HR: 50-60s  -Took all home medications this AM. Toprol XL 50 mg daily, Losartan 50 mg daily.   -Will hold ARB therapy in the setting of HILARY and anticipated cardiac cath this admission  -Norvasc 5 mg daily added to regimen this evening

## 2021-11-01 NOTE — H&P ADULT - NSICDXPASTMEDICALHX_GEN_ALL_CORE_FT
PAST MEDICAL HISTORY:  Coronary artery disease with angina pectoris, unspecified vessel or lesion type, unspecified whether native or transplanted heart     Diabetes     HTN (hypertension)      PAST MEDICAL HISTORY:  CAD (coronary artery disease)     Diabetes     HTN (hypertension)     Hyperlipidemia     Peripheral artery disease

## 2021-11-02 LAB
A1C WITH ESTIMATED AVERAGE GLUCOSE RESULT: 7.7 % — HIGH (ref 4–5.6)
ALBUMIN SERPL ELPH-MCNC: 3.2 G/DL — LOW (ref 3.3–5)
ALP SERPL-CCNC: 83 U/L — SIGNIFICANT CHANGE UP (ref 40–120)
ALT FLD-CCNC: 21 U/L — SIGNIFICANT CHANGE UP (ref 10–45)
ANION GAP SERPL CALC-SCNC: 8 MMOL/L — SIGNIFICANT CHANGE UP (ref 5–17)
APTT BLD: 65.9 SEC — HIGH (ref 27.5–35.5)
APTT BLD: 80.2 SEC — HIGH (ref 27.5–35.5)
AST SERPL-CCNC: 19 U/L — SIGNIFICANT CHANGE UP (ref 10–40)
BASOPHILS # BLD AUTO: 0.04 K/UL — SIGNIFICANT CHANGE UP (ref 0–0.2)
BASOPHILS NFR BLD AUTO: 0.6 % — SIGNIFICANT CHANGE UP (ref 0–2)
BILIRUB SERPL-MCNC: 0.2 MG/DL — SIGNIFICANT CHANGE UP (ref 0.2–1.2)
BUN SERPL-MCNC: 19 MG/DL — SIGNIFICANT CHANGE UP (ref 7–23)
CALCIUM SERPL-MCNC: 8.6 MG/DL — SIGNIFICANT CHANGE UP (ref 8.4–10.5)
CHLORIDE SERPL-SCNC: 108 MMOL/L — SIGNIFICANT CHANGE UP (ref 96–108)
CHOLEST SERPL-MCNC: 114 MG/DL — SIGNIFICANT CHANGE UP
CO2 SERPL-SCNC: 24 MMOL/L — SIGNIFICANT CHANGE UP (ref 22–31)
COVID-19 SPIKE DOMAIN AB INTERP: POSITIVE
COVID-19 SPIKE DOMAIN ANTIBODY RESULT: >250 U/ML — HIGH
CREAT SERPL-MCNC: 1.26 MG/DL — SIGNIFICANT CHANGE UP (ref 0.5–1.3)
EOSINOPHIL # BLD AUTO: 0.4 K/UL — SIGNIFICANT CHANGE UP (ref 0–0.5)
EOSINOPHIL NFR BLD AUTO: 6 % — SIGNIFICANT CHANGE UP (ref 0–6)
ESTIMATED AVERAGE GLUCOSE: 174 MG/DL — HIGH (ref 68–114)
GLUCOSE BLDC GLUCOMTR-MCNC: 119 MG/DL — HIGH (ref 70–99)
GLUCOSE BLDC GLUCOMTR-MCNC: 150 MG/DL — HIGH (ref 70–99)
GLUCOSE BLDC GLUCOMTR-MCNC: 317 MG/DL — HIGH (ref 70–99)
GLUCOSE SERPL-MCNC: 138 MG/DL — HIGH (ref 70–99)
HCT VFR BLD CALC: 33.3 % — LOW (ref 39–50)
HCV AB S/CO SERPL IA: 0.05 S/CO — SIGNIFICANT CHANGE UP
HCV AB SERPL-IMP: SIGNIFICANT CHANGE UP
HDLC SERPL-MCNC: 37 MG/DL — LOW
HGB BLD-MCNC: 11.1 G/DL — LOW (ref 13–17)
IMM GRANULOCYTES NFR BLD AUTO: 0.1 % — SIGNIFICANT CHANGE UP (ref 0–1.5)
LIPID PNL WITH DIRECT LDL SERPL: 62 MG/DL — SIGNIFICANT CHANGE UP
LYMPHOCYTES # BLD AUTO: 1.59 K/UL — SIGNIFICANT CHANGE UP (ref 1–3.3)
LYMPHOCYTES # BLD AUTO: 23.7 % — SIGNIFICANT CHANGE UP (ref 13–44)
MAGNESIUM SERPL-MCNC: 1.9 MG/DL — SIGNIFICANT CHANGE UP (ref 1.6–2.6)
MCHC RBC-ENTMCNC: 29.8 PG — SIGNIFICANT CHANGE UP (ref 27–34)
MCHC RBC-ENTMCNC: 33.3 GM/DL — SIGNIFICANT CHANGE UP (ref 32–36)
MCV RBC AUTO: 89.5 FL — SIGNIFICANT CHANGE UP (ref 80–100)
MONOCYTES # BLD AUTO: 0.79 K/UL — SIGNIFICANT CHANGE UP (ref 0–0.9)
MONOCYTES NFR BLD AUTO: 11.8 % — SIGNIFICANT CHANGE UP (ref 2–14)
NEUTROPHILS # BLD AUTO: 3.87 K/UL — SIGNIFICANT CHANGE UP (ref 1.8–7.4)
NEUTROPHILS NFR BLD AUTO: 57.8 % — SIGNIFICANT CHANGE UP (ref 43–77)
NON HDL CHOLESTEROL: 77 MG/DL — SIGNIFICANT CHANGE UP
NRBC # BLD: 0 /100 WBCS — SIGNIFICANT CHANGE UP (ref 0–0)
PLATELET # BLD AUTO: 224 K/UL — SIGNIFICANT CHANGE UP (ref 150–400)
POTASSIUM SERPL-MCNC: 4.4 MMOL/L — SIGNIFICANT CHANGE UP (ref 3.5–5.3)
POTASSIUM SERPL-SCNC: 4.4 MMOL/L — SIGNIFICANT CHANGE UP (ref 3.5–5.3)
PROT SERPL-MCNC: 6 G/DL — SIGNIFICANT CHANGE UP (ref 6–8.3)
RBC # BLD: 3.72 M/UL — LOW (ref 4.2–5.8)
RBC # FLD: 12.6 % — SIGNIFICANT CHANGE UP (ref 10.3–14.5)
SARS-COV-2 IGG+IGM SERPL QL IA: >250 U/ML — HIGH
SARS-COV-2 IGG+IGM SERPL QL IA: POSITIVE
SODIUM SERPL-SCNC: 140 MMOL/L — SIGNIFICANT CHANGE UP (ref 135–145)
TRIGL SERPL-MCNC: 73 MG/DL — SIGNIFICANT CHANGE UP
WBC # BLD: 6.7 K/UL — SIGNIFICANT CHANGE UP (ref 3.8–10.5)
WBC # FLD AUTO: 6.7 K/UL — SIGNIFICANT CHANGE UP (ref 3.8–10.5)

## 2021-11-02 PROCEDURE — 92928 PRQ TCAT PLMT NTRAC ST 1 LES: CPT | Mod: RC

## 2021-11-02 PROCEDURE — 93455 CORONARY ART/GRFT ANGIO S&I: CPT | Mod: 26,59

## 2021-11-02 PROCEDURE — 73630 X-RAY EXAM OF FOOT: CPT | Mod: 26,RT

## 2021-11-02 PROCEDURE — 93306 TTE W/DOPPLER COMPLETE: CPT | Mod: 26

## 2021-11-02 PROCEDURE — 99152 MOD SED SAME PHYS/QHP 5/>YRS: CPT

## 2021-11-02 RX ORDER — METOPROLOL TARTRATE 50 MG
50 TABLET ORAL ONCE
Refills: 0 | Status: COMPLETED | OUTPATIENT
Start: 2021-11-02 | End: 2021-11-02

## 2021-11-02 RX ORDER — AMLODIPINE BESYLATE 2.5 MG/1
5 TABLET ORAL ONCE
Refills: 0 | Status: COMPLETED | OUTPATIENT
Start: 2021-11-02 | End: 2021-11-02

## 2021-11-02 RX ORDER — SODIUM CHLORIDE 9 MG/ML
500 INJECTION INTRAMUSCULAR; INTRAVENOUS; SUBCUTANEOUS
Refills: 0 | Status: DISCONTINUED | OUTPATIENT
Start: 2021-11-02 | End: 2021-11-02

## 2021-11-02 RX ORDER — SODIUM CHLORIDE 9 MG/ML
500 INJECTION INTRAMUSCULAR; INTRAVENOUS; SUBCUTANEOUS
Refills: 0 | Status: DISCONTINUED | OUTPATIENT
Start: 2021-11-02 | End: 2021-11-03

## 2021-11-02 RX ORDER — HYDROCORTISONE 20 MG
200 TABLET ORAL ONCE
Refills: 0 | Status: COMPLETED | OUTPATIENT
Start: 2021-11-02 | End: 2021-11-02

## 2021-11-02 RX ORDER — HYDRALAZINE HCL 50 MG
10 TABLET ORAL ONCE
Refills: 0 | Status: DISCONTINUED | OUTPATIENT
Start: 2021-11-02 | End: 2021-11-02

## 2021-11-02 RX ORDER — MAGNESIUM OXIDE 400 MG ORAL TABLET 241.3 MG
400 TABLET ORAL ONCE
Refills: 0 | Status: COMPLETED | OUTPATIENT
Start: 2021-11-02 | End: 2021-11-02

## 2021-11-02 RX ORDER — HYDRALAZINE HCL 50 MG
25 TABLET ORAL THREE TIMES A DAY
Refills: 0 | Status: DISCONTINUED | OUTPATIENT
Start: 2021-11-02 | End: 2021-11-03

## 2021-11-02 RX ORDER — DIPHENHYDRAMINE HCL 50 MG
50 CAPSULE ORAL ONCE
Refills: 0 | Status: DISCONTINUED | OUTPATIENT
Start: 2021-11-02 | End: 2021-11-03

## 2021-11-02 RX ADMIN — Medication 81 MILLIGRAM(S): at 12:33

## 2021-11-02 RX ADMIN — ATORVASTATIN CALCIUM 80 MILLIGRAM(S): 80 TABLET, FILM COATED ORAL at 00:00

## 2021-11-02 RX ADMIN — Medication 25 MILLIGRAM(S): at 21:06

## 2021-11-02 RX ADMIN — AMLODIPINE BESYLATE 5 MILLIGRAM(S): 2.5 TABLET ORAL at 01:50

## 2021-11-02 RX ADMIN — INSULIN GLARGINE 32 UNIT(S): 100 INJECTION, SOLUTION SUBCUTANEOUS at 21:38

## 2021-11-02 RX ADMIN — SODIUM CHLORIDE 75 MILLILITER(S): 9 INJECTION INTRAMUSCULAR; INTRAVENOUS; SUBCUTANEOUS at 19:05

## 2021-11-02 RX ADMIN — HEPARIN SODIUM 750 UNIT(S)/HR: 5000 INJECTION INTRAVENOUS; SUBCUTANEOUS at 02:46

## 2021-11-02 RX ADMIN — Medication 200 MILLIGRAM(S): at 16:12

## 2021-11-02 RX ADMIN — INSULIN GLARGINE 32 UNIT(S): 100 INJECTION, SOLUTION SUBCUTANEOUS at 00:01

## 2021-11-02 RX ADMIN — Medication 8: at 21:36

## 2021-11-02 RX ADMIN — CLOPIDOGREL BISULFATE 75 MILLIGRAM(S): 75 TABLET, FILM COATED ORAL at 12:33

## 2021-11-02 RX ADMIN — Medication 25 MILLIGRAM(S): at 14:32

## 2021-11-02 RX ADMIN — ATORVASTATIN CALCIUM 80 MILLIGRAM(S): 80 TABLET, FILM COATED ORAL at 21:06

## 2021-11-02 RX ADMIN — MAGNESIUM OXIDE 400 MG ORAL TABLET 400 MILLIGRAM(S): 241.3 TABLET ORAL at 10:01

## 2021-11-02 RX ADMIN — Medication 50 MILLIGRAM(S): at 00:28

## 2021-11-02 RX ADMIN — Medication 50 MILLIGRAM(S): at 10:01

## 2021-11-02 NOTE — PROGRESS NOTE ADULT - ASSESSMENT
60 yo male, CONTRAST DYE ALLERGY (HIVES), Former smoker with PMHx of HTN, DM II, known CAD s/p 3vCABG at [facility] in 2008 (LIMA-LAD, SVG-OM, SVG-PDA), followed by multiple PCIs, most recent 6/11/2018 ([facility]) s/p rotoblator/RAJENDRA mRCA and RAJENDRA pRCA (s/p Right perclose, TVP placed in RFV) complicated by acute limb ischemia s/p R femoral artery exploration, thrombectomy, patch repair with vascular team 6/16/2021, known PAD s/p prior intervention; most recent RLE Intervention with Dr. Kaiden Diane two weeks ago @ [facility], presented to [facility] ED (11/1/2021) with unstable angina. Trop peaked 0.08. Admitted to cardiac tele for ACS management and cardiac catheterization with possible intervention today 11/2.

## 2021-11-02 NOTE — PROGRESS NOTE ADULT - PROBLEM SELECTOR PLAN 7
-Patient reports one week of buzzing in right ear.   -Requesting ENT consultation; discuss further with Dr. Angulo in AM     Dispo: F/U Troponin @ 8 PM. Echo ordered for AM. Tentative plan for cardiac catheterization tomorrow pending renal function    Case reviewed with Dr. Angulo and patient updated at bedside. -Patient reports one week of buzzing in right ear.   -Requesting ENT consultation; discuss further with Dr. Angulo     Dispo:  plan for cardiac catheterization today

## 2021-11-02 NOTE — PROGRESS NOTE ADULT - PROBLEM SELECTOR PLAN 3
-F/U AM lipid panel  -Continue Lipitor 80 mg daily -Lipid panel T Chol 114, Tri 73, HDL 37, LDL 62  -Continue Lipitor 80 mg daily

## 2021-11-02 NOTE — PROGRESS NOTE ADULT - SUBJECTIVE AND OBJECTIVE BOX
CARDIOLOGY NP PROGRESS NOTE    Subjective: Pt seen and examined at bedside. Reports intermittent chest pain started few days ago while walking. Denies chest pain, sob, lightheadedness, dizziness, palpitations, fever, chills.  Remainder ROS otherwise negative.    Overnight Events: Trop peaked 0.08.    TELEMETRY: SR 60s         VITAL SIGNS:  T(C): 35.7 (11-02-21 @ 13:49), Max: 36.9 (11-01-21 @ 18:24)  HR: 61 (11-02-21 @ 13:00) (50 - 77)  BP: 164/71 (11-02-21 @ 13:00) (156/72 - 182/74)  RR: 18 (11-02-21 @ 13:00) (18 - 20)  SpO2: 98% (11-02-21 @ 13:00) (97% - 99%)  Wt(kg): --    I&O's Summary    02 Nov 2021 07:01  -  02 Nov 2021 15:20  --------------------------------------------------------  IN: 0 mL / OUT: 300 mL / NET: -300 mL          PHYSICAL EXAM:    General: A/ox 3, No acute Distress  Neck: Supple, NO JVD  Cardiac: S1 S2, No M/R/G, Healed sternotomy scar  Pulmonary: CTAB, Breathing unlabored on RA, No Rhonchi/Rales/Wheezing  Abdomen: Soft, Non -tender, +BS x 4 quads  Extremities: No Rashes, trace wily LE edema. L wrist healed wrist scar. Wily LE cool to touch. R great toe   Vascular: 2+ L radial, 1+ R radial pulse, R Femoral bruit, Doppler DP pulses wily  Neuro: A/o x 3, No focal deficits          LABS:                          11.1   6.70  )-----------( 224      ( 02 Nov 2021 06:11 )             33.3                              11-02    140  |  108  |  19  ----------------------------<  138<H>  4.4   |  24  |  1.26    Ca    8.6      02 Nov 2021 06:11  Mg     1.9     11-02    TPro  6.0  /  Alb  3.2<L>  /  TBili  0.2  /  DBili  x   /  AST  19  /  ALT  21  /  AlkPhos  83  11-02    LIVER FUNCTIONS - ( 02 Nov 2021 06:11 )  Alb: 3.2 g/dL / Pro: 6.0 g/dL / ALK PHOS: 83 U/L / ALT: 21 U/L / AST: 19 U/L / GGT: x         PT/INR - ( 01 Nov 2021 15:43 )   PT: 11.3 sec;   INR: 0.94          PTT - ( 02 Nov 2021 08:54 )  PTT:65.9 sec  CAPILLARY BLOOD GLUCOSE      POCT Blood Glucose.: 119 mg/dL (02 Nov 2021 12:50)  POCT Blood Glucose.: 150 mg/dL (02 Nov 2021 09:30)  POCT Blood Glucose.: 200 mg/dL (01 Nov 2021 23:42)    CARDIAC MARKERS ( 01 Nov 2021 20:47 )  x     / 0.07 ng/mL / 256 U/L / x     / 5.1 ng/mL  CARDIAC MARKERS ( 01 Nov 2021 15:43 )  x     / 0.08 ng/mL / 332 U/L / x     / 7.1 ng/mL          Allergies:  iodinated radiocontrast agents (Hives)    MEDICATIONS  (STANDING):  aspirin enteric coated 81 milliGRAM(s) Oral daily  atorvastatin 80 milliGRAM(s) Oral at bedtime  clopidogrel Tablet 75 milliGRAM(s) Oral daily  dextrose 40% Gel 15 Gram(s) Oral once  dextrose 5%. 1000 milliLiter(s) (50 mL/Hr) IV Continuous <Continuous>  dextrose 5%. 1000 milliLiter(s) (100 mL/Hr) IV Continuous <Continuous>  dextrose 50% Injectable 25 Gram(s) IV Push once  dextrose 50% Injectable 12.5 Gram(s) IV Push once  dextrose 50% Injectable 25 Gram(s) IV Push once  diphenhydrAMINE Injectable 50 milliGRAM(s) IV Push once  glucagon  Injectable 1 milliGRAM(s) IntraMuscular once  heparin  Infusion.  Unit(s)/Hr (8 mL/Hr) IV Continuous <Continuous>  hydrALAZINE 25 milliGRAM(s) Oral three times a day  hydrocortisone sodium succinate Injectable 200 milliGRAM(s) IV Push once  influenza   Vaccine 0.5 milliLiter(s) IntraMuscular once  insulin glargine Injectable (LANTUS) 32 Unit(s) SubCutaneous at bedtime  insulin lispro (ADMELOG) corrective regimen sliding scale   SubCutaneous three times a day before meals  metoprolol succinate ER 50 milliGRAM(s) Oral daily    MEDICATIONS  (PRN):  heparin   Injectable 4100 Unit(s) IV Push every 6 hours PRN For aPTT less than 40        DIAGNOSTIC TESTS:        CARDIOLOGY NP PROGRESS NOTE    Subjective: Pt seen and examined at bedside. Reports intermittent chest pain started few days ago while at getting groceries and again while at work yesterday (works as ). Denies chest pain, sob, lightheadedness, dizziness, palpitations, fever, chills.  Remainder ROS otherwise negative.    Overnight Events: Trop peaked 0.08.     TELEMETRY: SR 60s         VITAL SIGNS:  T(C): 35.7 (11-02-21 @ 13:49), Max: 36.9 (11-01-21 @ 18:24)  HR: 61 (11-02-21 @ 13:00) (50 - 77)  BP: 164/71 (11-02-21 @ 13:00) (156/72 - 182/74)  RR: 18 (11-02-21 @ 13:00) (18 - 20)  SpO2: 98% (11-02-21 @ 13:00) (97% - 99%)  Wt(kg): --    I&O's Summary    02 Nov 2021 07:01  -  02 Nov 2021 15:20  --------------------------------------------------------  IN: 0 mL / OUT: 300 mL / NET: -300 mL          PHYSICAL EXAM:    General: A/ox 3, No acute Distress  Neck: Supple, NO JVD  Cardiac: S1 S2, No M/R/G, Healed sternotomy scar  Pulmonary: CTAB, Breathing unlabored on RA, No Rhonchi/Rales/Wheezing  Abdomen: Soft, Non -tender, +BS x 4 quads  Extremities: No Rashes, trace wily LE edema. L wrist healed wrist scar. Wily LE cool to touch. R great toe tip small blackened/hard area  Vascular: 2+ L radial, 1+ R radial pulse, R Femoral bruit, Doppler DP pulses wily  Neuro: A/o x 3, No focal deficits          LABS:                          11.1   6.70  )-----------( 224      ( 02 Nov 2021 06:11 )             33.3                              11-02    140  |  108  |  19  ----------------------------<  138<H>  4.4   |  24  |  1.26    Ca    8.6      02 Nov 2021 06:11  Mg     1.9     11-02    TPro  6.0  /  Alb  3.2<L>  /  TBili  0.2  /  DBili  x   /  AST  19  /  ALT  21  /  AlkPhos  83  11-02    LIVER FUNCTIONS - ( 02 Nov 2021 06:11 )  Alb: 3.2 g/dL / Pro: 6.0 g/dL / ALK PHOS: 83 U/L / ALT: 21 U/L / AST: 19 U/L / GGT: x         PT/INR - ( 01 Nov 2021 15:43 )   PT: 11.3 sec;   INR: 0.94          PTT - ( 02 Nov 2021 08:54 )  PTT:65.9 sec  CAPILLARY BLOOD GLUCOSE      POCT Blood Glucose.: 119 mg/dL (02 Nov 2021 12:50)  POCT Blood Glucose.: 150 mg/dL (02 Nov 2021 09:30)  POCT Blood Glucose.: 200 mg/dL (01 Nov 2021 23:42)    CARDIAC MARKERS ( 01 Nov 2021 20:47 )  x     / 0.07 ng/mL / 256 U/L / x     / 5.1 ng/mL  CARDIAC MARKERS ( 01 Nov 2021 15:43 )  x     / 0.08 ng/mL / 332 U/L / x     / 7.1 ng/mL          Allergies:  iodinated radiocontrast agents (Hives)    MEDICATIONS  (STANDING):  aspirin enteric coated 81 milliGRAM(s) Oral daily  atorvastatin 80 milliGRAM(s) Oral at bedtime  clopidogrel Tablet 75 milliGRAM(s) Oral daily  dextrose 40% Gel 15 Gram(s) Oral once  dextrose 5%. 1000 milliLiter(s) (50 mL/Hr) IV Continuous <Continuous>  dextrose 5%. 1000 milliLiter(s) (100 mL/Hr) IV Continuous <Continuous>  dextrose 50% Injectable 25 Gram(s) IV Push once  dextrose 50% Injectable 12.5 Gram(s) IV Push once  dextrose 50% Injectable 25 Gram(s) IV Push once  diphenhydrAMINE Injectable 50 milliGRAM(s) IV Push once  glucagon  Injectable 1 milliGRAM(s) IntraMuscular once  heparin  Infusion.  Unit(s)/Hr (8 mL/Hr) IV Continuous <Continuous>  hydrALAZINE 25 milliGRAM(s) Oral three times a day  hydrocortisone sodium succinate Injectable 200 milliGRAM(s) IV Push once  influenza   Vaccine 0.5 milliLiter(s) IntraMuscular once  insulin glargine Injectable (LANTUS) 32 Unit(s) SubCutaneous at bedtime  insulin lispro (ADMELOG) corrective regimen sliding scale   SubCutaneous three times a day before meals  metoprolol succinate ER 50 milliGRAM(s) Oral daily    MEDICATIONS  (PRN):  heparin   Injectable 4100 Unit(s) IV Push every 6 hours PRN For aPTT less than 40        DIAGNOSTIC TESTS:        CARDIOLOGY NP PROGRESS NOTE    Subjective: Pt seen and examined at bedside. Reports intermittent chest pain started few days ago while at getting groceries and again while at work yesterday (works as ). Denies chest pain, sob, lightheadedness, dizziness, palpitations, fever, chills.  Remainder ROS otherwise negative.    Overnight Events: Trop peaked 0.08.     TELEMETRY: SR 60s         VITAL SIGNS:  T(C): 35.7 (11-02-21 @ 13:49), Max: 36.9 (11-01-21 @ 18:24)  HR: 61 (11-02-21 @ 13:00) (50 - 77)  BP: 164/71 (11-02-21 @ 13:00) (156/72 - 182/74)  RR: 18 (11-02-21 @ 13:00) (18 - 20)  SpO2: 98% (11-02-21 @ 13:00) (97% - 99%)  Wt(kg): --    I&O's Summary    02 Nov 2021 07:01  -  02 Nov 2021 15:20  --------------------------------------------------------  IN: 0 mL / OUT: 300 mL / NET: -300 mL          PHYSICAL EXAM:    General: A/ox 3, No acute Distress  Neck: Supple, NO JVD  Cardiac: S1 S2, No M/R/G, Healed sternotomy scar  Pulmonary: CTAB, Breathing unlabored on RA, No Rhonchi/Rales/Wheezing  Abdomen: Soft, Non -tender, +BS x 4 quads  Extremities: No Rashes, trace wily LE edema. L wrist healed wrist scar. Wily LE cool to touch. R great toe tip small blackened/hard area since RLE angiogram 2 weeks ago  Vascular: 2+ L radial, 1+ R radial pulse, R Femoral bruit, Doppler DP pulses wily  Neuro: A/o x 3, No focal deficits          LABS:                          11.1   6.70  )-----------( 224      ( 02 Nov 2021 06:11 )             33.3                              11-02    140  |  108  |  19  ----------------------------<  138<H>  4.4   |  24  |  1.26    Ca    8.6      02 Nov 2021 06:11  Mg     1.9     11-02    TPro  6.0  /  Alb  3.2<L>  /  TBili  0.2  /  DBili  x   /  AST  19  /  ALT  21  /  AlkPhos  83  11-02    LIVER FUNCTIONS - ( 02 Nov 2021 06:11 )  Alb: 3.2 g/dL / Pro: 6.0 g/dL / ALK PHOS: 83 U/L / ALT: 21 U/L / AST: 19 U/L / GGT: x         PT/INR - ( 01 Nov 2021 15:43 )   PT: 11.3 sec;   INR: 0.94          PTT - ( 02 Nov 2021 08:54 )  PTT:65.9 sec  CAPILLARY BLOOD GLUCOSE      POCT Blood Glucose.: 119 mg/dL (02 Nov 2021 12:50)  POCT Blood Glucose.: 150 mg/dL (02 Nov 2021 09:30)  POCT Blood Glucose.: 200 mg/dL (01 Nov 2021 23:42)    CARDIAC MARKERS ( 01 Nov 2021 20:47 )  x     / 0.07 ng/mL / 256 U/L / x     / 5.1 ng/mL  CARDIAC MARKERS ( 01 Nov 2021 15:43 )  x     / 0.08 ng/mL / 332 U/L / x     / 7.1 ng/mL          Allergies:  iodinated radiocontrast agents (Hives)    MEDICATIONS  (STANDING):  aspirin enteric coated 81 milliGRAM(s) Oral daily  atorvastatin 80 milliGRAM(s) Oral at bedtime  clopidogrel Tablet 75 milliGRAM(s) Oral daily  dextrose 40% Gel 15 Gram(s) Oral once  dextrose 5%. 1000 milliLiter(s) (50 mL/Hr) IV Continuous <Continuous>  dextrose 5%. 1000 milliLiter(s) (100 mL/Hr) IV Continuous <Continuous>  dextrose 50% Injectable 25 Gram(s) IV Push once  dextrose 50% Injectable 12.5 Gram(s) IV Push once  dextrose 50% Injectable 25 Gram(s) IV Push once  diphenhydrAMINE Injectable 50 milliGRAM(s) IV Push once  glucagon  Injectable 1 milliGRAM(s) IntraMuscular once  heparin  Infusion.  Unit(s)/Hr (8 mL/Hr) IV Continuous <Continuous>  hydrALAZINE 25 milliGRAM(s) Oral three times a day  hydrocortisone sodium succinate Injectable 200 milliGRAM(s) IV Push once  influenza   Vaccine 0.5 milliLiter(s) IntraMuscular once  insulin glargine Injectable (LANTUS) 32 Unit(s) SubCutaneous at bedtime  insulin lispro (ADMELOG) corrective regimen sliding scale   SubCutaneous three times a day before meals  metoprolol succinate ER 50 milliGRAM(s) Oral daily    MEDICATIONS  (PRN):  heparin   Injectable 4100 Unit(s) IV Push every 6 hours PRN For aPTT less than 40        DIAGNOSTIC TESTS:

## 2021-11-02 NOTE — PROGRESS NOTE ADULT - PROBLEM SELECTOR PLAN 6
-BUN/CR: 23/1.54 on admission  -No history of CKD on prior labs in [place]   -Will give gentle hydration overnight: NS @ 75 cc/hour x six hours ordered (euvolemic on exam and CXR wet read clear).   -Holding home Losartan 50 mg daily  -UA and Urine lytes ordered. -BUN/CR: 23/1.54 on admission  -No history of CKD on prior labs in [place]   -S/p gentle IVF hydration overnight: NS @ 75 cc/hour x six hours ordered (euvolemic on exam and CXR wet read clear).   -Holding home Losartan 50 mg daily  -Repeat BUN/crea 19/1.26 improving today  -UA and Urine lytes ordered.

## 2021-11-02 NOTE — PROGRESS NOTE ADULT - PROBLEM SELECTOR PLAN 1
- Currently chest pain free. Last episode around 3 PM yesterday while at work (works as ).   - Peaked CE: Troponin T  0.08, CKMB: 7.1, CK: 332   - s/p 3v CABG at [facility] in 2008 (LIMA-LAD, SVG-OM, SVG-PDA). Most recent angiogram 6/11/2018 ([facility]) s/p rotoblator/RAJENDRA mRCA and RAJENDRA pRCA (complicated by acute limb s/p CFA exploratory sx).   -c/w Heparin gtt (nomogram) for ACS protocol  -Plan for cardiac catheterization with possible intervention today w/ Dr Day. Consent in 11th floor PA Office  -*Known Contrast allergy: will premedicate w/ IV Solucortef and IV Benedryl prior to cath  -Will continue home regimen: ASA 81 mg daily, Plavix 75 mg daily (remains compliant with DAPT Therapy). Lipitor 80 mg daily, Toprol Xl 50 mg daily. Norvasc 5 mg daily added to regimen for BP control   - Prior Echo ([facility] 7/1/2020): Grade I diastolic dysfunction, LVEF 65-70%, trace PI, minimal TR.  -Echo ordered    -F/U AM Lipid panel and Hemoglobin A1c 7.7 - Currently chest pain free. Last episode around 3 PM yesterday while at work (works as ).   - Peaked CE: Troponin T  0.08, CKMB: 7.1, CK: 332   - s/p 3v CABG at [facility] in 2008 (LIMA-LAD, SVG-OM, SVG-PDA). Most recent angiogram 6/11/2018 ([facility]) s/p rotoblator/RAJENDRA mRCA and RAJENDRA pRCA (complicated by acute limb s/p CFA exploratory sx).   -c/w Heparin gtt (nomogram) for ACS protocol  -Plan for cardiac catheterization with possible intervention today w/ Dr Day. Consent in 11th floor PA Office  -*Known Contrast allergy: will premedicate w/ IV Solucortef and IV Benedryl prior to cath  -Will continue home regimen: ASA 81 mg daily, Plavix 75 mg daily (remains compliant with DAPT Therapy). Lipitor 80 mg daily, Toprol Xl 50 mg daily  - Prior Echo ([facility] 7/1/2020): Grade I diastolic dysfunction, LVEF 65-70%, trace PI, minimal TR.  -ECHO 11/2: EF 50%, mild hypokinesis to inferoseptum and basal-mid inferior walls

## 2021-11-02 NOTE — PROGRESS NOTE ADULT - PROBLEM SELECTOR PLAN 2
-SBP: 170-180 mmHG on arrival, HR: 50-60s  -Took all home medications this AM. Toprol XL 50 mg daily, Losartan 50 mg daily.   -Will hold ARB therapy in the setting of HILARY and anticipated cardiac cath this admission  -Norvasc 5 mg daily added to regimen this evening -SBP: 170-180 mmHG on arrival, HR: 50-60s  -Takes at home: Toprol XL 50 mg daily, Losartan 50 mg daily.   -c/w Toprol 50mg qd  -Will hold ARB therapy in the setting of HILARY and anticipated cardiac cath this admission  -Elevated /80s this afternoon, D/c'ed Norvasc 5 mg. Started Hydral 25mg TID and uptitrate as needed

## 2021-11-02 NOTE — PROGRESS NOTE ADULT - PROBLEM SELECTOR PLAN 4
-F/U AM Hemoglobin A1c  -Home Lantus 40 Units qhS (reduced by 20% while admitted) to 32 units qHS  -Humalog Sliding Scale Coverage ordered, FS QID -HgA1c 7.7  -Home Lantus 40 Units qhS (reduced by 20% while admitted) to 32 units qHS  -MISS ordered, FS QID

## 2021-11-02 NOTE — PROGRESS NOTE ADULT - PROBLEM SELECTOR PLAN 5
-s/p LLE peripheral angiogram at [facility] with Dr. Kaiden Diane ([phone]) two weeks ago.   -Patient noted chronic right foot pain since acute limb event 2018  -Continue ASA, Plavix, Lipitor 80 mg daily. -s/p LLE peripheral angiogram at [facility] with Dr. Kaiden Diane ([phone]) two weeks ago.   -Patient noted chronic right foot pain since acute limb event 2018  -Pt reports R great toe small blackened area since RLE angio (via L groin access) 2 weeks ago. Denies pain as he has decreased sensation to R toes at baseline  -Podiatry consulted, possibly microthrombi post RLE angio. F/u recs  -obtain R foot Xray per Podiatry  -Continue ASA, Plavix, Lipitor 80 mg daily

## 2021-11-03 ENCOUNTER — TRANSCRIPTION ENCOUNTER (OUTPATIENT)
Age: 61
End: 2021-11-03

## 2021-11-03 VITALS
SYSTOLIC BLOOD PRESSURE: 162 MMHG | RESPIRATION RATE: 16 BRPM | HEART RATE: 65 BPM | DIASTOLIC BLOOD PRESSURE: 69 MMHG | OXYGEN SATURATION: 98 %

## 2021-11-03 LAB
ANION GAP SERPL CALC-SCNC: 8 MMOL/L — SIGNIFICANT CHANGE UP (ref 5–17)
BUN SERPL-MCNC: 26 MG/DL — HIGH (ref 7–23)
CALCIUM SERPL-MCNC: 8.7 MG/DL — SIGNIFICANT CHANGE UP (ref 8.4–10.5)
CHLORIDE SERPL-SCNC: 108 MMOL/L — SIGNIFICANT CHANGE UP (ref 96–108)
CO2 SERPL-SCNC: 22 MMOL/L — SIGNIFICANT CHANGE UP (ref 22–31)
CREAT SERPL-MCNC: 1.36 MG/DL — HIGH (ref 0.5–1.3)
GLUCOSE BLDC GLUCOMTR-MCNC: 157 MG/DL — HIGH (ref 70–99)
GLUCOSE BLDC GLUCOMTR-MCNC: 96 MG/DL — SIGNIFICANT CHANGE UP (ref 70–99)
GLUCOSE SERPL-MCNC: 135 MG/DL — HIGH (ref 70–99)
HCT VFR BLD CALC: 32.4 % — LOW (ref 39–50)
HGB BLD-MCNC: 10.9 G/DL — LOW (ref 13–17)
MAGNESIUM SERPL-MCNC: 2.1 MG/DL — SIGNIFICANT CHANGE UP (ref 1.6–2.6)
MCHC RBC-ENTMCNC: 30.2 PG — SIGNIFICANT CHANGE UP (ref 27–34)
MCHC RBC-ENTMCNC: 33.6 GM/DL — SIGNIFICANT CHANGE UP (ref 32–36)
MCV RBC AUTO: 89.8 FL — SIGNIFICANT CHANGE UP (ref 80–100)
NRBC # BLD: 0 /100 WBCS — SIGNIFICANT CHANGE UP (ref 0–0)
PLATELET # BLD AUTO: 236 K/UL — SIGNIFICANT CHANGE UP (ref 150–400)
POTASSIUM SERPL-MCNC: 4.4 MMOL/L — SIGNIFICANT CHANGE UP (ref 3.5–5.3)
POTASSIUM SERPL-SCNC: 4.4 MMOL/L — SIGNIFICANT CHANGE UP (ref 3.5–5.3)
RBC # BLD: 3.61 M/UL — LOW (ref 4.2–5.8)
RBC # FLD: 12.5 % — SIGNIFICANT CHANGE UP (ref 10.3–14.5)
SODIUM SERPL-SCNC: 138 MMOL/L — SIGNIFICANT CHANGE UP (ref 135–145)
URATE UR-MCNC: 17.8 MG/DL — SIGNIFICANT CHANGE UP
WBC # BLD: 7.82 K/UL — SIGNIFICANT CHANGE UP (ref 3.8–10.5)
WBC # FLD AUTO: 7.82 K/UL — SIGNIFICANT CHANGE UP (ref 3.8–10.5)

## 2021-11-03 PROCEDURE — 80048 BASIC METABOLIC PNL TOTAL CA: CPT

## 2021-11-03 PROCEDURE — 84156 ASSAY OF PROTEIN URINE: CPT

## 2021-11-03 PROCEDURE — 84484 ASSAY OF TROPONIN QUANT: CPT

## 2021-11-03 PROCEDURE — 99285 EMERGENCY DEPT VISIT HI MDM: CPT

## 2021-11-03 PROCEDURE — 71045 X-RAY EXAM CHEST 1 VIEW: CPT

## 2021-11-03 PROCEDURE — 83036 HEMOGLOBIN GLYCOSYLATED A1C: CPT

## 2021-11-03 PROCEDURE — 85027 COMPLETE CBC AUTOMATED: CPT

## 2021-11-03 PROCEDURE — 81001 URINALYSIS AUTO W/SCOPE: CPT

## 2021-11-03 PROCEDURE — 82962 GLUCOSE BLOOD TEST: CPT

## 2021-11-03 PROCEDURE — 93005 ELECTROCARDIOGRAM TRACING: CPT

## 2021-11-03 PROCEDURE — 84540 ASSAY OF URINE/UREA-N: CPT

## 2021-11-03 PROCEDURE — 86803 HEPATITIS C AB TEST: CPT

## 2021-11-03 PROCEDURE — 80061 LIPID PANEL: CPT

## 2021-11-03 PROCEDURE — 82550 ASSAY OF CK (CPK): CPT

## 2021-11-03 PROCEDURE — 83880 ASSAY OF NATRIURETIC PEPTIDE: CPT

## 2021-11-03 PROCEDURE — 84300 ASSAY OF URINE SODIUM: CPT

## 2021-11-03 PROCEDURE — 93306 TTE W/DOPPLER COMPLETE: CPT

## 2021-11-03 PROCEDURE — 82570 ASSAY OF URINE CREATININE: CPT

## 2021-11-03 PROCEDURE — 84560 ASSAY OF URINE/URIC ACID: CPT

## 2021-11-03 PROCEDURE — 86769 SARS-COV-2 COVID-19 ANTIBODY: CPT

## 2021-11-03 PROCEDURE — C1887: CPT

## 2021-11-03 PROCEDURE — 83735 ASSAY OF MAGNESIUM: CPT

## 2021-11-03 PROCEDURE — 73630 X-RAY EXAM OF FOOT: CPT

## 2021-11-03 PROCEDURE — C1894: CPT

## 2021-11-03 PROCEDURE — 87635 SARS-COV-2 COVID-19 AMP PRB: CPT

## 2021-11-03 PROCEDURE — 85730 THROMBOPLASTIN TIME PARTIAL: CPT

## 2021-11-03 PROCEDURE — 83935 ASSAY OF URINE OSMOLALITY: CPT

## 2021-11-03 PROCEDURE — 36415 COLL VENOUS BLD VENIPUNCTURE: CPT

## 2021-11-03 PROCEDURE — C1725: CPT

## 2021-11-03 PROCEDURE — 82553 CREATINE MB FRACTION: CPT

## 2021-11-03 PROCEDURE — C1769: CPT

## 2021-11-03 PROCEDURE — 80053 COMPREHEN METABOLIC PANEL: CPT

## 2021-11-03 PROCEDURE — 85610 PROTHROMBIN TIME: CPT

## 2021-11-03 PROCEDURE — C1874: CPT

## 2021-11-03 PROCEDURE — 85025 COMPLETE CBC W/AUTO DIFF WBC: CPT

## 2021-11-03 RX ORDER — CLOPIDOGREL BISULFATE 75 MG/1
1 TABLET, FILM COATED ORAL
Qty: 30 | Refills: 11
Start: 2021-11-03 | End: 2022-10-28

## 2021-11-03 RX ORDER — ASPIRIN/CALCIUM CARB/MAGNESIUM 324 MG
1 TABLET ORAL
Qty: 30 | Refills: 11
Start: 2021-11-03 | End: 2022-10-28

## 2021-11-03 RX ORDER — ATORVASTATIN CALCIUM 80 MG/1
1 TABLET, FILM COATED ORAL
Qty: 30 | Refills: 2
Start: 2021-11-03 | End: 2022-01-31

## 2021-11-03 RX ORDER — LOSARTAN POTASSIUM 100 MG/1
50 TABLET, FILM COATED ORAL DAILY
Refills: 0 | Status: DISCONTINUED | OUTPATIENT
Start: 2021-11-03 | End: 2021-11-03

## 2021-11-03 RX ADMIN — CLOPIDOGREL BISULFATE 75 MILLIGRAM(S): 75 TABLET, FILM COATED ORAL at 11:58

## 2021-11-03 RX ADMIN — Medication 50 MILLIGRAM(S): at 06:37

## 2021-11-03 RX ADMIN — LOSARTAN POTASSIUM 50 MILLIGRAM(S): 100 TABLET, FILM COATED ORAL at 11:58

## 2021-11-03 RX ADMIN — Medication 81 MILLIGRAM(S): at 11:58

## 2021-11-03 RX ADMIN — Medication 2: at 11:58

## 2021-11-03 RX ADMIN — Medication 25 MILLIGRAM(S): at 06:37

## 2021-11-03 NOTE — DISCHARGE NOTE PROVIDER - PROVIDER TOKENS
PROVIDER:[TOKEN:[[id number]],FOLLOWUP:[2 weeks],ESTABLISHEDPATIENT:[T]] PROVIDER:[TOKEN:[[id number]],FOLLOWUP:[2 weeks],ESTABLISHEDPATIENT:[T]],PROVIDER:[TOKEN:[[id number]],FOLLOWUP:[2 weeks]]

## 2021-11-03 NOTE — DISCHARGE NOTE PROVIDER - HOSPITAL COURSE
62 yo male, CONTRAST DYE ALLERGY (HIVES), Former smoker with PMHx of HTN, DM II, known CAD s/p 3vCABG at [facility] in 2008 (LIMA-LAD, SVG-OM, SVG-PDA), followed by multiple PCIs, most recent 6/11/2018 ([facility]) s/p rotoblator/RAJENDRA mRCA and RAJENDRA pRCA (s/p Right perclose, TVP placed in RFV) complicated by acute limb ischemia s/p R femoral artery exploration, thrombectomy, patch repair with vascular team 6/16/2021, known PAD s/p prior intervention; most recent RLE Intervention with Dr. Kaiden Diane two weeks ago @ [facility], presented to [facility] ED (11/1/2021) with unstable angina. Trop peaked 0.08. Admitted to cardiac tele for ACS management and cardiac catheterization with possible intervention 11/2. S/p cardiac cath on 11/2/21 RAJENDRA to dRCA. Patent stents in prox/midRCA and patent stent LM into Ramus, Patent LIMA to LAD, Patent SVG to OM1  L rad TR @ 830 EF 50% from echo today   60 yo male, CONTRAST DYE ALLERGY (HIVES), Former smoker with PMHx of HTN, DM II, known CAD s/p 3vCABG at [facility] in 2008 (LIMA-LAD, SVG-OM, SVG-PDA), followed by multiple PCIs, most recent 6/11/2018 ([facility]) s/p rotoblator/RAJENDRA mRCA and RAJENDRA pRCA (s/p Right perclose, TVP placed in RFV), complicated by acute limb ischemia s/p R femoral artery exploration, thrombectomy, patch repair with vascular team 6/16/2021, known PAD s/p prior intervention; most recent RLE Intervention with Dr. Kaiden Diane 2 weeks ago @ [facility], presented to [facility] ED (11/1/2021) with unstable angina. Trop peaked 0.08. Admitted to cardiac tele for ACS management and cardiac catheterization on 11/2. Pt received pre-medication IV Solucortef and IV Benedryl for contrast allergy. Pt underwent cardiac catheterization 11/2/21 s/p RAJENDRA to dRCA. Patent stents in prox/midRCA and patent stent LM into Ramus, Patent LIMA to LAD, Patent SVG to OM1. L radial site CDI w/o hematoma. ECHO 11/2: EF 50%, mild hypokinesis to inferoseptum and basal-mid inferior walls.    Of note, Patient noted chronic right foot pain since acute limb event 2018.Pt reports R great toe small blackened area since RLE angio (via L groin access) 2 weeks ago. Denies pain as he has decreased sensation to R toes at baseline. Podiatry consulted: Right great toe lesion likely dry eschar consistent w/ known hx of PAD and critical limb ischemia. Will allow skin lesion to demarcate/fall off independently; will avoid bedside debridement as pt is at high risk of non-healing wounds. Right foot Xray don't reveal bony abnormalities that correlate w/ area of dry eschar. No clinical signs of infection to R foot.      Pt was seen and examined at bedside, denies any complaints of chest pain, dizziness, SOB, palpitations, pain, LE edema, fever and/or chills. Right radial access site soft, no bleeding or swelling at site, radial pulse 2+, DP/PT pulses at baseline dopplerable. No events on tele overnight, VSS, Labs unremarkable/stable, Physical exam WNL. Pt was seen and examined by cardiology attending as well and is deemed stable for discharge per Dr. Angulo.   Pt is to continue home ASA/Plavix, Atorvastatin. Pt is to follow up with cardiologist Dr. Day in 1-2 weeks for post discharge check-up. Pt instructed to return to ED/seek immediate medical attention if symptoms of chest pain, SOB, LOC, bleeding from the access site. Pt agrees with the discharge plan, verbalizes understanding of the information given. All new medications explained risks/side effects and e-prescribed to patient preferred pharmacy.

## 2021-11-03 NOTE — DISCHARGE NOTE PROVIDER - NSDCFUADDINST_GEN_ALL_CORE_FT
- Do NOT drive or operate hazardous machinery for 24 hours. Limit your physical activity for 24-48 hours. Do NOT engage in sports, heavy work or heavy lifting more than 5 lbs for 5 days.   - You MAY shower and wash the area gently with soap and water. BUT no TUB BATHS, HOT TUBS OR SWIMMING FOR 5 DAYS.  Do not keep the area covered. Do not put any lotions, creams, or ointments on the site.  - Your procedure was done through your right wrist. If you observe flank bleeding from the puncture site, it is an emergency. Please put direct pressure on the site and go directly to the ER. Bleeding under the skin may also occur and a small "black and blue" may be expected. If the area appears to be expanding or swelling around the puncture site, apply manual compression and go immediately to the nearest ER. If your arm/hand becomes cool or blue and/or you are unable to move it, this must be treated as an emergency, go directly to the nearest ER. Look for signs of infection in the wrist: fever, red streaking of the arm, obvious pus formation and pain.  -If you have any issues or concerns regarding your access site, you may call [facility] Interventional Cardiology at [phone].

## 2021-11-03 NOTE — DISCHARGE NOTE PROVIDER - NSDCMRMEDTOKEN_GEN_ALL_CORE_FT
aspirin 81 mg oral delayed release tablet: 1 tab(s) orally once a day  atorvastatin 80 mg oral tablet: 1 tab(s) orally once a day (at bedtime)  clopidogrel 75 mg oral tablet: 1 tab(s) orally once a day  Cozaar 50 mg oral tablet: 1 tab(s) orally once a day  Lantus Solostar Pen 100 units/mL subcutaneous solution: 40 unit(s) subcutaneous once a day (at bedtime)  metFORMIN 1000 mg oral tablet: 1 tab(s) orally once a day  Toprol-XL 50 mg oral tablet, extended release: 1 tab(s) orally once a day   aspirin 81 mg oral delayed release tablet: 1 tab(s) orally once a day  atorvastatin 80 mg oral tablet: 1 tab(s) orally once a day (at bedtime)  Cardiac rehabilitation. 3 times a week for 12 weeks. Dx CAD s/p PCI. Outpatient cardiologist Dr Killian Day. [phone]:   clopidogrel 75 mg oral tablet: 1 tab(s) orally once a day  Cozaar 50 mg oral tablet: 1 tab(s) orally once a day  Lantus Solostar Pen 100 units/mL subcutaneous solution: 40 unit(s) subcutaneous once a day (at bedtime)  metFORMIN 1000 mg oral tablet: 1 tab(s) orally once a day  Toprol-XL 50 mg oral tablet, extended release: 1 tab(s) orally once a day

## 2021-11-03 NOTE — DISCHARGE NOTE PROVIDER - CARE PROVIDER_API CALL
Killian Day (MD)  Cardiology; Interventional Cardiology  [address] [place] [address]  Phone: [phone]  Fax: [phone]  Established Patient  Follow Up Time: 2 weeks   Killian Day)  Cardiology; Interventional Cardiology  [address] [place] [address]  Phone: [phone]  Fax: [phone]  Established Patient  Follow Up Time: 2 weeks    Henrry TamezDPM)  Podiatric Medicine and Surgery  [address], [facility] [address]  [place] [address]  Phone: [phone]  Fax: [phone]  Follow Up Time: 2 weeks

## 2021-11-03 NOTE — DISCHARGE NOTE NURSING/CASE MANAGEMENT/SOCIAL WORK - HISTORY OF COVID-19 VACCINATION
Follow up Labs  Received: Yesterday  Shahida Murray MD [place], Marlen Pollard, [place]  Cc: Mellissa Mcclellan MD [place]     She was admitted with UTI, and being discharged on PO antibiotics. VERONIQUE improved with IVF, not sure about her PO intake, please order BMP for tomorrow. If creatinine trending up, she will need IVF bolus 500 ml.     Thanks,     Shahida     OUTCOME: RNCC spoke with Cande. She states she will not go to lab today or take anything without Dr. Quinton Díaz backing up the recommendations. She is aware she was to get labs right after discharge from hospital but did not have orders. Explained to Cande this was the nature of the call. BMP would be ordered today for blood drawn. She declined. Notified Cande this writer would reach out to Dr. Díaz's office with our recommendation and would ask they follow up with patient.     RNCC spoke with Abby, [facility], regarding recommendations from Dr. Mcclellan, transplant nephrologist. Angelique with follow up with patient. Call back number provided for questions.     Marlen Little RN, BSN  Solid Organ Transplant, Post Kidney and Pancreas  Transplant Care Coordinator  [phone]        Yes

## 2021-11-03 NOTE — DISCHARGE NOTE NURSING/CASE MANAGEMENT/SOCIAL WORK - NSDCFUADDAPPT_GEN_ALL_CORE_FT
[facility] Initial Evaluation  Subjective:  Diagnosed with osteoporosis ~2 years ago.Had infusion but had reaction to it so now taking Vitamin D and calcium. Has most discomfort in R LB area.  WORSE with prolonged walking. BETTER with Tylenol prn, rest. Goal is to have less pain and prevent osteoporosis from worsening. Lives in home with spouse/stairs.       The history is provided by the patient. No  was used.   Danielle Bryan is a 73 year old female with a lumbar condition.  Condition occurred with:  Insidious onset.  Condition occurred: for unknown reasons.  This is a chronic condition     Patient reports pain:  Lumbar spine right.    Pain is described as aching and is constant and reported as 5/10 and 3/10.   Pain is worse during the day.     Since onset symptoms are gradually worsening.  Special tests:  Bone scan (see Dexa report ).        Pertinent medical history includes:  Menopausal, stroke, thyroid problems and osteoporosis.  Medical allergies: Cipro.  Other surgeries include:  No.  Medication history: see EPIC.    Employment status: retired.                                  Objective:  Standing Alignment:    Cervical/Thoracic:  Forward head and thoracic kyphosis increased  Shoulder/UE:  Rounded shoulders  Lumbar:  Lordosis decr                           Lumbar/SI Evaluation  ROM:    AROM Lumbar:   Flexion:          To mid-tibias  Ext:                    Mod loss   Side Bend:        Left:  Min loss    Right:  Min loss  Rotation:           Left:     Right:   Side Glide:        Left:     Right:         Strength: B bridge fair control. B hip abductor strength 3+/5.   Lumbar Myotomes:  not assessed                          Spinal Segmental Conclusions:     Level: Hypo noted at T10, T11, T12, L1, L2, L3, L4, S1 and L5                                                   General     ROS    Assessment/Plan:    Patient is a 73 year old female with lumbar complaints.     Patient has the following significant findings with corresponding treatment plan.                Diagnosis 1:  R LBP  Pain -  hot/cold therapy, manual therapy, splint/taping/bracing/orthotics, self management, education, directional preference exercise and home program  Decreased ROM/flexibility - manual therapy and therapeutic exercise  Decreased joint mobility - manual therapy and therapeutic exercise  Decreased strength - therapeutic exercise and therapeutic activities  Impaired balance - neuro re-education and therapeutic activities  Decreased proprioception - neuro re-education and therapeutic activities  Inflammation - self management/home program  Impaired gait - gait training  Impaired muscle performance - neuro re-education  Decreased function - therapeutic activities  Impaired posture - neuro re-education    Therapy Evaluation Codes:   1) History comprised of:   Personal factors that impact the plan of care:      Time since onset of symptoms.    Comorbidity factors that impact the plan of care are:      Menopausal and thyroid, osteoporosis.     Medications impacting care: Bone density.  2) Examination of Body Systems comprised of:   Body structures and functions that impact the plan of care:      Lumbar spine.   Activity limitations that impact the plan of care are:      Standing and Walking.  3) Clinical presentation characteristics are:   Evolving/Changing.  4) Decision-Making    Moderate complexity using standardized patient assessment instrument and/or measureable assessment of functional outcome.  Cumulative Therapy Evaluation is: Moderate complexity.    Previous and current functional limitations:  (See Goal Flow Sheet for this information)    Short term and Long term goals: (See Goal Flow Sheet for this information)     Communication ability:  Patient appears to be able to clearly communicate and understand verbal and written communication and follow directions correctly.  Treatment Explanation -  The following has been discussed with the patient:   RX ordered/plan of care  Anticipated outcomes  Possible risks and side effects  This patient would benefit from PT intervention to resume normal activities.   Rehab potential is excellent.    Frequency:  1 X week, once daily  Duration:  for 6 weeks  Discharge Plan:  Achieve all LTG.  Independent in home treatment program.  Reach maximal therapeutic benefit.    Please refer to the daily flowsheet for treatment today, total treatment time and time spent performing 1:1 timed codes.        -It is recommended for you to go to cardiac rehabilitation, which is outpatient physical therapy tailored to improve the heart. You were provided a prescription and should call your insurance company to find facilities that is covered by your insurance. We have provided you with a prescription for cardiac rehab which is medically supervised exercise program for your heart. It has been shown to improve the quantity and quality of life of people with heart disease like yours. You should attend cardiac rehab 3 times per week for 12 weeks. We have provided you with a list of nearby facilities. Please call your insurance carrier to determine which of these facilities are covered under your plan.   ---Please bring this prescription with you to your follow up appointment with your cardiologist who can then further assist you to enroll into a cardiac rehab program.

## 2021-11-03 NOTE — CONSULT NOTE ADULT - SUBJECTIVE AND OBJECTIVE BOX
Attending: Henrry Tamez DPM     Patient is a 61y old  Male who presents with a chief complaint of Chest pain (02 Nov 2021 15:20)      HPI:  Vaccination Status: Pfizer    Meds confirmed with patient at bedside (able to verbally recall; uses a mail-order pharmacy--can't recall name)    62 yo male, CONTRAST DYE ALLERGY (HIVES), Former smoker with PMHx of HTN, DM II, known CAD s/p 3vCABG at [facility] in 2008 (LIMA-LAD, SVG-OM, SVG-PDA), followed by multiple PCIs, most recent 6/11/2018 ([facility]) s/p rotoblator/RAJENDRA mRCA and RAJENDRA pRCA (s/p Right perclose, TVP placed in RFV) complicated by acute limb ischemia s/p R femoral artery exploration, thrombectomy, patch repair with vascular team 6/16/2021, known PAD s/p prior intervention; most recent LLE Intervention with Dr. Kaiden Diane two weeks ago @ [facility] presented to [facility] ED (11/1/2021) with the complaint of three episodes of substernal chest burning associated with mild nausea, dizziness and SOB over the last five days. Symptoms last 2-3 minutes and self-subside. On Saturday, chest discomfort occurred while pushing shopping cart and today’s episode occurred while at work ( in a [employer]). Patient denies syncope, leg edema, PND, orthopnea, abdominal pain. Patient notes since his acute limb ischemia event after cardiac catheterization in 2018 has chronic right foot pain which prompted his peripheral angiogram two weeks prior (he thinks he received an intervention). Patient also endorses a "buzzing" sound in his right ear that is constant at night and in the early AM and resolved as day progresses. Denies any trauma to area.     Pertinent values include H/H: 11.1/33.4, BUN/CR: 23/1.54, Glucose: 264, BNP: 454, Troponin T: 0.08, CKMB: 7.1, CK: 332. COVID-19 PCR negative. Wet read of Frontal CXR no significant congestion and/or infiltrate. Vital signs on arrival to ED: BP: 171/74, HR: 70, RR: 16, O2: 98% RA. 12 Lead EKG revealed sinus Rhythm @ 64 BPM with TWI III.  Prior Echo ([employer] 7/1/2020): Grade I diastolic dysfunction, LVEF 65-70%, trace PI, minimal TR. Patient is now admitted to cardiology service for ACS management and cardiac catheterization with possible intervention if clinically indicated.      (01 Nov 2021 17:17)      Review of systems negative except per HPI and as stated below  General:	 no weakness; no fevers, no chills  Skin/Breast: no rash  Respiratory and Thorax: no SOB, no cough  Cardiovascular:	No chest pain  Gastrointestinal:	 no nausea, vomiting , diarrhea  Genitourinary:	no dysuria, no difficulty urinating, no hematuria  Musculoskeletal:	no weakness, no joint swelling/pain  Neurological:	no focal weakness/numbness  Endocrine:	no polyuria, no polydipsia    PAST MEDICAL & SURGICAL HISTORY:  HTN (hypertension)    Diabetes    CAD (coronary artery disease)    Hyperlipidemia    Peripheral artery disease    S/P CABG x 3    H/O vascular surgery  R femoral thrombectomy    S/P coronary artery stent placement    Occlusion of femoral artery      Home Medications:  Lantus Solostar Pen 100 units/mL subcutaneous solution: 40 unit(s) subcutaneous once a day (at bedtime) (01 Nov 2021 18:20)  metFORMIN 1000 mg oral tablet: 1 tab(s) orally once a day (01 Nov 2021 18:20)  Toprol-XL 50 mg oral tablet, extended release: 1 tab(s) orally once a day (01 Nov 2021 18:20)    Allergies    iodinated radiocontrast agents (Hives)    Intolerances      FAMILY HISTORY:  No pertinent family history in first degree relatives      Social History:       LABS                        10.9   7.82  )-----------( 236      ( 03 Nov 2021 06:16 )             32.4     11-03    138  |  108  |  26<H>  ----------------------------<  135<H>  4.4   |  22  |  1.36<H>    Ca    8.7      03 Nov 2021 06:16  Mg     2.1     11-03    TPro  6.0  /  Alb  3.2<L>  /  TBili  0.2  /  DBili  x   /  AST  19  /  ALT  21  /  AlkPhos  83  11-02    PT/INR - ( 01 Nov 2021 15:43 )   PT: 11.3 sec;   INR: 0.94          PTT - ( 02 Nov 2021 08:54 )  PTT:65.9 sec    Vital Signs Last 24 Hrs  T(C): 36.9 (03 Nov 2021 05:45), Max: 36.9 (03 Nov 2021 05:45)  T(F): 98.4 (03 Nov 2021 05:45), Max: 98.4 (03 Nov 2021 05:45)  HR: 60 (03 Nov 2021 05:41) (56 - 65)  BP: 159/72 (03 Nov 2021 05:41) (141/63 - 164/71)  BP(mean): 103 (03 Nov 2021 05:41) (90 - 103)  RR: 16 (03 Nov 2021 05:41) (16 - 18)  SpO2: 100% (03 Nov 2021 05:41) (98% - 100%)    PHYSICAL EXAM  General: NAD, AA0x3    Lower Extremity Focused:  Vasc:  Derm:  Neuro:  MSK:     RADIOLOGY                       Attending: Henrry Tamez DPM     Patient is a 61y old  Male who presents with a chief complaint of Chest pain (02 Nov 2021 15:20)      HPI:  60 yo male, CONTRAST DYE ALLERGY (HIVES), Former smoker with PMHx of HTN, DM II, known CAD s/p 3vCABG at [facility] in 2008 (LIMA-LAD, SVG-OM, SVG-PDA), followed by multiple PCIs, most recent 6/11/2018 ([facility]) s/p rotoblator/RAJENDRA mRCA and RAJENDRA pRCA (s/p Right perclose, TVP placed in RFV) complicated by acute limb ischemia s/p R femoral artery exploration, thrombectomy, patch repair with vascular team 6/16/2021, known PAD s/p prior intervention; most recent LLE Intervention with Dr. Kaiden Diane two weeks ago @ [facility] presented to [facility] ED (11/1/2021) with the complaint of three episodes of substernal chest burning associated with mild nausea, dizziness and SOB over the last five days. Symptoms last 2-3 minutes and self-subside. On Saturday 10/30/21, chest discomfort occurred while pushing shopping cart and today’s episode occurred while at work ( in a [employer]). Patient denies syncope, leg edema, PND, orthopnea, abdominal pain. Patient notes since his acute limb ischemia event after cardiac catheterization in 2018 has chronic right foot pain which prompted his peripheral angiogram two weeks prior (he thinks he received an intervention). Patient also endorses a "buzzing" sound in his right ear that is constant at night and in the early AM and resolved as day progresses. Denies any trauma to area.     PODIATRY CONSULT  Podiatry consulted to evaluate a new small lesion to the distal-lateral tip of right great toe. Pt noticed the lesion about two weeks after undergoing LE angiogram at [facility]. States that the lesion has remained dark in color, relatively the same size, denies drainage/bleeding from the area. Confirms that he has not attempted to remove/pick at the lesion.       Review of systems negative except per HPI and as stated below  General:	 no weakness; no fevers, no chills  Skin/Breast: no rash  Respiratory and Thorax: no SOB, no cough  Cardiovascular:	No chest pain  Gastrointestinal:	 no nausea, vomiting , diarrhea  Genitourinary:	no dysuria, no difficulty urinating, no hematuria  Musculoskeletal:	no weakness, no joint swelling/pain  Neurological:	no focal weakness/numbness  Endocrine:	no polyuria, no polydipsia    PAST MEDICAL & SURGICAL HISTORY:  HTN (hypertension)    Diabetes    CAD (coronary artery disease)    Hyperlipidemia    Peripheral artery disease    S/P CABG x 3    H/O vascular surgery  R femoral thrombectomy    S/P coronary artery stent placement    Occlusion of femoral artery      Home Medications:  Lantus Solostar Pen 100 units/mL subcutaneous solution: 40 unit(s) subcutaneous once a day (at bedtime) (01 Nov 2021 18:20)  metFORMIN 1000 mg oral tablet: 1 tab(s) orally once a day (01 Nov 2021 18:20)  Toprol-XL 50 mg oral tablet, extended release: 1 tab(s) orally once a day (01 Nov 2021 18:20)    Allergies    iodinated radiocontrast agents (Hives)    Intolerances      FAMILY HISTORY:  No pertinent family history in first degree relatives      LABS                        10.9   7.82  )-----------( 236      ( 03 Nov 2021 06:16 )             32.4     11-03    138  |  108  |  26<H>  ----------------------------<  135<H>  4.4   |  22  |  1.36<H>    Ca    8.7      03 Nov 2021 06:16  Mg     2.1     11-03    TPro  6.0  /  Alb  3.2<L>  /  TBili  0.2  /  DBili  x   /  AST  19  /  ALT  21  /  AlkPhos  83  11-02    PT/INR - ( 01 Nov 2021 15:43 )   PT: 11.3 sec;   INR: 0.94          PTT - ( 02 Nov 2021 08:54 )  PTT:65.9 sec    Vital Signs Last 24 Hrs  T(C): 36.9 (03 Nov 2021 05:45), Max: 36.9 (03 Nov 2021 05:45)  T(F): 98.4 (03 Nov 2021 05:45), Max: 98.4 (03 Nov 2021 05:45)  HR: 60 (03 Nov 2021 05:41) (56 - 65)  BP: 159/72 (03 Nov 2021 05:41) (141/63 - 164/71)  BP(mean): 103 (03 Nov 2021 05:41) (90 - 103)  RR: 16 (03 Nov 2021 05:41) (16 - 18)  SpO2: 100% (03 Nov 2021 05:41) (98% - 100%)    PHYSICAL EXAM  General: NAD, AA0x3    Lower Extremity Focused:  Vasc: DP and AT non-palpable, non-dopplerable bilaterally. PT non-palpable, biphasic on doppler bilaterally. No edema or erythema.   Derm: Diffuse areas of hyperpigmentation, loss of hair growth, and brittle toe nails to both lower extremities consistent w/ limb ischemia. Superficial skin lesion to distal-lateral tip of right great toe - black in color, circular in shape, measures [id number].  Neuro: Protective sensation diminished b/l  MSK: Ambulates independently w/o assistive device.     RADIOLOGY  Resident Wet Read R foot XR: Unremarkable. No bony abnormalities that correlate w/ area of skin lesion.

## 2021-11-03 NOTE — DISCHARGE NOTE NURSING/CASE MANAGEMENT/SOCIAL WORK - PATIENT PORTAL LINK FT
You can access the FollowMyHealth Patient Portal offered by [facility] by registering at the following website: [email or web address]. By joining [employer]’s FollowMyHealth portal, you will also be able to view your health information using other applications (apps) compatible with our system.

## 2021-11-03 NOTE — DISCHARGE NOTE PROVIDER - NSDCCPCAREPLAN_GEN_ALL_CORE_FT
PRINCIPAL DISCHARGE DIAGNOSIS  Diagnosis: CAD (coronary artery disease)  Assessment and Plan of Treatment:        PRINCIPAL DISCHARGE DIAGNOSIS  Diagnosis: CAD (coronary artery disease)  Assessment and Plan of Treatment: You came into the hospital for chest pain and underwent a cardiac catheterization where 1 drug-eluting cardiac stent placed to blockage in the Right Coronary Artery. You will need to continue taking Asprin and Plavix daily for the cardiac stent. DO NOT STOP taking these medications unless directed by your cardiologist as this can be LIFE THREATENING and lead to closing up of the cardiac stent and causing a heart attack! Continue taking cholesterol medication Atorvastatin (Lipitor) to help keep your cholesterol under control.      SECONDARY DISCHARGE DIAGNOSES  Diagnosis: Eschar of toe  Assessment and Plan of Treatment: You have a small area of eschar to the right great toe after the right angiogram procedure 2 weeks ago. An eschar is a collection of dry, dead tissue within a wound. You were seen by the Podiatrist and the site is stable and not infected. Keep the area clean and dry. Allow the dead tissue to fall off by itself. Follow up with the Podiatrist in the office for monitoring.     PRINCIPAL DISCHARGE DIAGNOSIS  Diagnosis: CAD (coronary artery disease)  Assessment and Plan of Treatment: You came into the hospital for chest pain and underwent a cardiac catheterization where 1 drug-eluting cardiac stent placed to blockage in the Right Coronary Artery. You will need to continue taking Asprin and Plavix daily for the cardiac stent. DO NOT STOP taking these medications unless directed by your cardiologist as this can be LIFE THREATENING and lead to closing up of the cardiac stent and causing a heart attack! Continue taking cholesterol medication Atorvastatin (Lipitor) to help keep your cholesterol under control.      SECONDARY DISCHARGE DIAGNOSES  Diagnosis: Eschar of toe  Assessment and Plan of Treatment: You have a small area of eschar to the right great toe after the right angiogram procedure 2 weeks ago. An eschar is a collection of dry, dead tissue within a wound. You were seen by the Podiatrist and the site is stable and not infected. Keep the area clean and dry. Allow the dead tissue to fall off by itself. Follow up with the Podiatrist in the office for monitoring.    Diagnosis: Diabetes  Assessment and Plan of Treatment: You received contrast during the cardiac catheterization procedure which can cause kidney abnornality when combined with with your diabetic medication Metformin. Please HOLD Metformin for 2 days after the procedure, you may RESUME on 11/5/21.

## 2021-11-03 NOTE — CONSULT NOTE ADULT - ASSESSMENT
60 yo male, CONTRAST DYE ALLERGY (HIVES), Former smoker with PMHx of HTN, DM II, known CAD s/p 3vCABG at [facility] in 2008 (LIMA-LAD, SVG-OM, SVG-PDA), followed by multiple PCIs, most recent 6/11/2018 ([facility]) s/p rotoblator/RAJENDRA mRCA and RAJENDRA pRCA (s/p Right perclose, TVP placed in RFV) complicated by acute limb ischemia s/p R femoral artery exploration, thrombectomy, patch repair with vascular team 6/16/2021, known PAD s/p prior intervention; most recent LLE Intervention with Dr. Kaiden Diane two weeks ago @ [facility] presented to [facility] ED (11/1/2021) with the complaint of three episodes of substernal chest burning associated with mild nausea, dizziness and SOB over the last five days. Podiatry consulted for evaluation of new skin lesion to distal-lateral tip of right great toe, r/o gangrene.       P:   Right great toe lesion likely just a dry eschar consistent w/ known hx of PAD, CLI   Will allow skin lesion to demarcate/fall off independently; will avoid bedside debridement as pt is at high risk of non-healing wounds   Right foot radiographs don't reveal bony abnormalities that correlate w/ area of dry eschar   No clinical signs of infection to R foot      60 yo male, CONTRAST DYE ALLERGY (HIVES), Former smoker with PMHx of HTN, DM II, known CAD s/p 3vCABG at [facility] in 2008 (LIMA-LAD, SVG-OM, SVG-PDA), followed by multiple PCIs, most recent 6/11/2018 ([facility]) s/p rotoblator/RAJENDRA mRCA and RAJENDRA pRCA (s/p Right perclose, TVP placed in RFV) complicated by acute limb ischemia s/p R femoral artery exploration, thrombectomy, patch repair with vascular team 6/16/2021, known PAD s/p prior intervention; most recent LLE Intervention with Dr. Kaiden Diane two weeks ago @ [facility] presented to [facility] ED (11/1/2021) with the complaint of three episodes of substernal chest burning associated with mild nausea, dizziness and SOB over the last five days. Podiatry consulted for evaluation of new skin lesion to distal-lateral tip of right great toe, r/o gangrene.       P:   Right great toe lesion likely just a dry eschar consistent w/ known hx of PAD, CLI   Will allow skin lesion to demarcate/fall off independently; will avoid bedside debridement as pt is at high risk of non-healing wounds   Right foot radiographs don't reveal bony abnormalities that correlate w/ area of dry eschar   No clinical signs of infection to R foot   Will continue to follow appearance of eschar as outpatient     Patient should follow up with Dr. Henrry Tamez within 1 week of discharge.    Office information:          [place] Address- [address], [place] [address] Phone: [phone]         [place] Address- [address], [place] [address] Phone: [phone]
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
## 2021-11-12 DIAGNOSIS — I10 ESSENTIAL (PRIMARY) HYPERTENSION: ICD-10-CM

## 2021-11-12 DIAGNOSIS — I25.710 ATHEROSCLEROSIS OF AUTOLOGOUS VEIN CORONARY ARTERY BYPASS GRAFT(S) WITH UNSTABLE ANGINA PECTORIS: ICD-10-CM

## 2021-11-12 DIAGNOSIS — L97.511 NON-PRESSURE CHRONIC ULCER OF OTHER PART OF RIGHT FOOT LIMITED TO BREAKDOWN OF SKIN: ICD-10-CM

## 2021-11-12 DIAGNOSIS — I24.9 ACUTE ISCHEMIC HEART DISEASE, UNSPECIFIED: ICD-10-CM

## 2021-11-12 DIAGNOSIS — Z87.891 PERSONAL HISTORY OF NICOTINE DEPENDENCE: ICD-10-CM

## 2021-11-12 DIAGNOSIS — Z95.1 PRESENCE OF AORTOCORONARY BYPASS GRAFT: ICD-10-CM

## 2021-11-12 DIAGNOSIS — I21.4 NON-ST ELEVATION (NSTEMI) MYOCARDIAL INFARCTION: ICD-10-CM

## 2021-11-12 DIAGNOSIS — Z79.02 LONG TERM (CURRENT) USE OF ANTITHROMBOTICS/ANTIPLATELETS: ICD-10-CM

## 2021-11-12 DIAGNOSIS — T82.855A STENOSIS OF CORONARY ARTERY STENT, INITIAL ENCOUNTER: ICD-10-CM

## 2021-11-12 DIAGNOSIS — Z91.041 RADIOGRAPHIC DYE ALLERGY STATUS: ICD-10-CM

## 2021-11-12 DIAGNOSIS — Z95.820 PERIPHERAL VASCULAR ANGIOPLASTY STATUS WITH IMPLANTS AND GRAFTS: ICD-10-CM

## 2021-11-12 DIAGNOSIS — Z79.4 LONG TERM (CURRENT) USE OF INSULIN: ICD-10-CM

## 2021-11-12 DIAGNOSIS — E78.5 HYPERLIPIDEMIA, UNSPECIFIED: ICD-10-CM

## 2021-11-12 DIAGNOSIS — I25.720 ATHEROSCLEROSIS OF AUTOLOGOUS ARTERY CORONARY ARTERY BYPASS GRAFT(S) WITH UNSTABLE ANGINA PECTORIS: ICD-10-CM

## 2021-11-12 DIAGNOSIS — E11.51 TYPE 2 DIABETES MELLITUS WITH DIABETIC PERIPHERAL ANGIOPATHY WITHOUT GANGRENE: ICD-10-CM

## 2021-11-12 DIAGNOSIS — Z95.5 PRESENCE OF CORONARY ANGIOPLASTY IMPLANT AND GRAFT: ICD-10-CM

## 2021-11-12 DIAGNOSIS — I70.235 ATHEROSCLEROSIS OF NATIVE ARTERIES OF RIGHT LEG WITH ULCERATION OF OTHER PART OF FOOT: ICD-10-CM

## 2021-11-12 DIAGNOSIS — N17.9 ACUTE KIDNEY FAILURE, UNSPECIFIED: ICD-10-CM
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
## 2021-11-12 DIAGNOSIS — I25.110 ATHEROSCLEROTIC HEART DISEASE OF NATIVE CORONARY ARTERY WITH UNSTABLE ANGINA PECTORIS: ICD-10-CM

## 2021-11-12 DIAGNOSIS — Z79.82 LONG TERM (CURRENT) USE OF ASPIRIN: ICD-10-CM

## 2021-11-12 DIAGNOSIS — H93.11 TINNITUS, RIGHT EAR: ICD-10-CM

## 2021-11-12 DIAGNOSIS — I70.223 ATHEROSCLEROSIS OF NATIVE ARTERIES OF EXTREMITIES WITH REST PAIN, BILATERAL LEGS: ICD-10-CM

## 2021-11-23 PROBLEM — I25.10 ATHEROSCLEROTIC HEART DISEASE OF NATIVE CORONARY ARTERY WITHOUT ANGINA PECTORIS: Chronic | Status: ACTIVE | Noted: 2021-11-01

## 2021-11-23 PROBLEM — E78.5 HYPERLIPIDEMIA, UNSPECIFIED: Chronic | Status: ACTIVE | Noted: 2021-11-01

## 2021-11-23 PROBLEM — I73.9 PERIPHERAL VASCULAR DISEASE, UNSPECIFIED: Chronic | Status: ACTIVE | Noted: 2021-11-01

## 2021-11-24 NOTE — ED ADULT NURSE NOTE - PT NEEDS ASSIST
[FreeTextEntry1] : 60 yo male with h/o vitiligo, HTN, allergies, here for CPE.  In late summer had intermittent RUQ pain with stones on US c/w cholelithiasis.  Underwent elective cholecystectomy and is doing well. Overall no new complaints.  Lost 20 lbs and then gained back 5 lbs  during pandemic.  Asking for Diamox for high altitude. \par \par 1. HTN:  well controlled on losartan and HCTZ.\par 2. Herpes:Acyclovir daily.\par 3. Depression: on sertraline.\par 4. Allergies:sees allergist for shots monthly. \par 5. Vitiligo: stable.\par 6. cholelithiasis: s/p cholecystectomy and doing well.\par 7. HCM:  labs today.  Flu shot recommended.  Had 2 COVID vaccines.   needs colonoscopy.  Diamox prescribed for the prevention of mountain sickness for his upcoming trip.  no

## 2021-12-13 ENCOUNTER — APPOINTMENT (OUTPATIENT)
Dept: HEART AND VASCULAR | Facility: CLINIC | Age: 61
End: 2021-12-13

## 2021-12-13 NOTE — REASON FOR VISIT
[Hyperlipidemia] : hyperlipidemia [Hypertension] : hypertension [Coronary Artery Disease] : coronary artery disease [FreeTextEntry1] : 61M former smoker w/PMHx CAD s/p CABG (2008: LIMA-LAD, SVG-OM, SVG-RPDA) and multiple PCIs, PAD s/p multiple interventions, HTN, HLD, T2DM, who presents for f/u after recent PCI.

## 2021-12-13 NOTE — CARDIOLOGY SUMMARY
[de-identified] : \par EKG (11/01/2021):\par - NSR w/o ischemic changes [de-identified] : \par TTE (11/02/2021):\par - HK of mid IS and basal-mid IW w/LVEF 50%\par - No significant valve disease [de-identified] : \par LHC (04/17/2018):\par - 90% LM and 95% OM1 vs RI (early/take-off) -> 2.5 x 38mm Promus Premier RAJENDRA (POT w/3.5mm NC)\par \par C (06/11/2018):\par - Calcified 90% p/mRCA -> Rota (1.5mm alexis) and PCI w/3.5 x 15mm, 3.0 x 38mm Resolute [place] RAJENDRA\par \par [facility] (11/02/2021):\par - LM: Patent LM-OM1 stent\par - LAD: 100% mLAD -> fills via patent LIMA-LAD\par - LCx: 100% oLCx -> fills via patent SVG-OM1\par - RCA: Patent p/mRCA stents, 90% dRCA -> 2.75 x 23mm Xience Skypoint RAJENDRA (post-dilated w/3.0mm NC)\par - SVG-RPDA known to be occluded

## 2021-12-13 NOTE — DISCUSSION/SUMMARY
[FreeTextEntry1] : 61M former smoker w/PMHx CAD s/p CABG (2008: LIMA-LAD, SVG-OM, SVG-RPDA) and multiple PCIs, PAD s/p multiple interventions, HTN, HLD, T2DM (IDDM), who presents for f/u after recent PCI.\par \par 1) CAD s/p CABG and Mult PCIs\par - Patent LIMA-LAD and SVG-OM1, occluded SVG-RPDA\par - Patent stents LM-OM1 and p/mRCA. S/p PCI dRCA 11/2021\par - Stable, no anginal symptoms. Recent EKG w/o ischemic changes.\par - C/w DAPT (ASA/Plavix), Lipitor 80 (LDL 62), Toprol 50\par \par 2) PAD\par - Follow up w/Dr. Kaiden Diane ([facility]) and podiatry Dr. Tamez\par \par 3) HTN\par - C/w Losartan 50, Toprol 50\par \par 4) T2DM\par - A1c 7.7. C/w Lantus, Metformin\par \par

## 2021-12-13 NOTE — HISTORY OF PRESENT ILLNESS
[FreeTextEntry1] : PT [facility] 12/13/2021 APPT\par \par Pt presented to [facility] for UA on 11/1/21, underwent LHC/PCI w/RAJENDRA x1 to dRCA. Of note, pt had ALI after PCI 2018 req RCFA exploration, thrombectomy, and patch repair. During recent hospitalization, pt c/o chronic R foot pain since intervention and R hallux lesion consistent w/dry eschar. Podiatry recommended outpatient follow up and to let eschar fall off independently.\par \par Labs (11/2/21-11/3/21)\par - Cr 1.36\par - K 4.4\par - A1c 7.7\par - LDL 62

## 2022-03-11 ENCOUNTER — INPATIENT (INPATIENT)
Facility: HOSPITAL | Age: 62
LOS: 4 days | Discharge: HOME HEALTH | DRG: 255 | End: 2022-03-16
Attending: HOSPITALIST | Admitting: HOSPITALIST
Payer: COMMERCIAL

## 2022-03-11 VITALS
TEMPERATURE: 98 F | HEART RATE: 75 BPM | HEIGHT: 65 IN | WEIGHT: 160.06 LBS | DIASTOLIC BLOOD PRESSURE: 78 MMHG | RESPIRATION RATE: 18 BRPM | OXYGEN SATURATION: 99 % | SYSTOLIC BLOOD PRESSURE: 182 MMHG

## 2022-03-11 DIAGNOSIS — I73.9 PERIPHERAL VASCULAR DISEASE, UNSPECIFIED: ICD-10-CM

## 2022-03-11 DIAGNOSIS — Z95.1 PRESENCE OF AORTOCORONARY BYPASS GRAFT: Chronic | ICD-10-CM

## 2022-03-11 DIAGNOSIS — I70.209 UNSPECIFIED ATHEROSCLEROSIS OF NATIVE ARTERIES OF EXTREMITIES, UNSPECIFIED EXTREMITY: Chronic | ICD-10-CM

## 2022-03-11 DIAGNOSIS — I96 GANGRENE, NOT ELSEWHERE CLASSIFIED: ICD-10-CM

## 2022-03-11 DIAGNOSIS — E11.9 TYPE 2 DIABETES MELLITUS WITHOUT COMPLICATIONS: ICD-10-CM

## 2022-03-11 DIAGNOSIS — Z95.5 PRESENCE OF CORONARY ANGIOPLASTY IMPLANT AND GRAFT: Chronic | ICD-10-CM

## 2022-03-11 DIAGNOSIS — I10 ESSENTIAL (PRIMARY) HYPERTENSION: ICD-10-CM

## 2022-03-11 DIAGNOSIS — R63.8 OTHER SYMPTOMS AND SIGNS CONCERNING FOOD AND FLUID INTAKE: ICD-10-CM

## 2022-03-11 DIAGNOSIS — Z98.890 OTHER SPECIFIED POSTPROCEDURAL STATES: Chronic | ICD-10-CM

## 2022-03-11 DIAGNOSIS — D64.9 ANEMIA, UNSPECIFIED: ICD-10-CM

## 2022-03-11 DIAGNOSIS — I25.10 ATHEROSCLEROTIC HEART DISEASE OF NATIVE CORONARY ARTERY WITHOUT ANGINA PECTORIS: ICD-10-CM

## 2022-03-11 DIAGNOSIS — E78.5 HYPERLIPIDEMIA, UNSPECIFIED: ICD-10-CM

## 2022-03-11 DIAGNOSIS — N17.9 ACUTE KIDNEY FAILURE, UNSPECIFIED: ICD-10-CM

## 2022-03-11 LAB
ALBUMIN SERPL ELPH-MCNC: 3.2 G/DL — LOW (ref 3.3–5)
ALP SERPL-CCNC: 115 U/L — SIGNIFICANT CHANGE UP (ref 40–120)
ALT FLD-CCNC: 14 U/L — SIGNIFICANT CHANGE UP (ref 10–45)
ANION GAP SERPL CALC-SCNC: 9 MMOL/L — SIGNIFICANT CHANGE UP (ref 5–17)
APTT BLD: 29.6 SEC — SIGNIFICANT CHANGE UP (ref 27.5–35.5)
AST SERPL-CCNC: 15 U/L — SIGNIFICANT CHANGE UP (ref 10–40)
B-OH-BUTYR SERPL-SCNC: 0.1 MMOL/L — SIGNIFICANT CHANGE UP
BASE EXCESS BLDV CALC-SCNC: -2.2 MMOL/L — LOW (ref -2–3)
BASOPHILS # BLD AUTO: 0.09 K/UL — SIGNIFICANT CHANGE UP (ref 0–0.2)
BASOPHILS NFR BLD AUTO: 0.6 % — SIGNIFICANT CHANGE UP (ref 0–2)
BILIRUB SERPL-MCNC: <0.2 MG/DL — SIGNIFICANT CHANGE UP (ref 0.2–1.2)
BLD GP AB SCN SERPL QL: NEGATIVE — SIGNIFICANT CHANGE UP
BUN SERPL-MCNC: 25 MG/DL — HIGH (ref 7–23)
CALCIUM SERPL-MCNC: 8.5 MG/DL — SIGNIFICANT CHANGE UP (ref 8.4–10.5)
CHLORIDE SERPL-SCNC: 104 MMOL/L — SIGNIFICANT CHANGE UP (ref 96–108)
CO2 BLDV-SCNC: 25.6 MMOL/L — SIGNIFICANT CHANGE UP (ref 22–26)
CO2 SERPL-SCNC: 21 MMOL/L — LOW (ref 22–31)
CREAT SERPL-MCNC: 1.52 MG/DL — HIGH (ref 0.5–1.3)
CRP SERPL-MCNC: 22 MG/L — HIGH (ref 0–4)
EGFR: 51 ML/MIN/1.73M2 — LOW
EOSINOPHIL # BLD AUTO: 0.33 K/UL — SIGNIFICANT CHANGE UP (ref 0–0.5)
EOSINOPHIL NFR BLD AUTO: 2.3 % — SIGNIFICANT CHANGE UP (ref 0–6)
ERYTHROCYTE [SEDIMENTATION RATE] IN BLOOD: 90 MM/HR — HIGH
GLUCOSE BLDC GLUCOMTR-MCNC: 214 MG/DL — HIGH (ref 70–99)
GLUCOSE SERPL-MCNC: 249 MG/DL — HIGH (ref 70–99)
HCO3 BLDV-SCNC: 24 MMOL/L — SIGNIFICANT CHANGE UP (ref 22–29)
HCT VFR BLD CALC: 34.6 % — LOW (ref 39–50)
HGB BLD-MCNC: 11.7 G/DL — LOW (ref 13–17)
IMM GRANULOCYTES NFR BLD AUTO: 0.4 % — SIGNIFICANT CHANGE UP (ref 0–1.5)
INR BLD: 1.04 — SIGNIFICANT CHANGE UP (ref 0.88–1.16)
LACTATE SERPL-SCNC: 1.1 MMOL/L — SIGNIFICANT CHANGE UP (ref 0.5–2)
LYMPHOCYTES # BLD AUTO: 1.45 K/UL — SIGNIFICANT CHANGE UP (ref 1–3.3)
LYMPHOCYTES # BLD AUTO: 10.2 % — LOW (ref 13–44)
MCHC RBC-ENTMCNC: 29.6 PG — SIGNIFICANT CHANGE UP (ref 27–34)
MCHC RBC-ENTMCNC: 33.8 GM/DL — SIGNIFICANT CHANGE UP (ref 32–36)
MCV RBC AUTO: 87.6 FL — SIGNIFICANT CHANGE UP (ref 80–100)
MONOCYTES # BLD AUTO: 1.03 K/UL — HIGH (ref 0–0.9)
MONOCYTES NFR BLD AUTO: 7.3 % — SIGNIFICANT CHANGE UP (ref 2–14)
NEUTROPHILS # BLD AUTO: 11.24 K/UL — HIGH (ref 1.8–7.4)
NEUTROPHILS NFR BLD AUTO: 79.2 % — HIGH (ref 43–77)
NRBC # BLD: 0 /100 WBCS — SIGNIFICANT CHANGE UP (ref 0–0)
PCO2 BLDV: 47 MMHG — SIGNIFICANT CHANGE UP (ref 42–55)
PH BLDV: 7.32 — SIGNIFICANT CHANGE UP (ref 7.32–7.43)
PLATELET # BLD AUTO: 418 K/UL — HIGH (ref 150–400)
PO2 BLDV: 29 MMHG — SIGNIFICANT CHANGE UP (ref 25–45)
POTASSIUM SERPL-MCNC: 4.8 MMOL/L — SIGNIFICANT CHANGE UP (ref 3.5–5.3)
POTASSIUM SERPL-SCNC: 4.8 MMOL/L — SIGNIFICANT CHANGE UP (ref 3.5–5.3)
PROT SERPL-MCNC: 6.9 G/DL — SIGNIFICANT CHANGE UP (ref 6–8.3)
PROTHROM AB SERPL-ACNC: 12.4 SEC — SIGNIFICANT CHANGE UP (ref 10.5–13.4)
RBC # BLD: 3.95 M/UL — LOW (ref 4.2–5.8)
RBC # FLD: 11.9 % — SIGNIFICANT CHANGE UP (ref 10.3–14.5)
RH IG SCN BLD-IMP: POSITIVE — SIGNIFICANT CHANGE UP
SAO2 % BLDV: 49.3 % — LOW (ref 67–88)
SARS-COV-2 RNA SPEC QL NAA+PROBE: SIGNIFICANT CHANGE UP
SODIUM SERPL-SCNC: 134 MMOL/L — LOW (ref 135–145)
WBC # BLD: 14.2 K/UL — HIGH (ref 3.8–10.5)
WBC # FLD AUTO: 14.2 K/UL — HIGH (ref 3.8–10.5)

## 2022-03-11 PROCEDURE — 99232 SBSQ HOSP IP/OBS MODERATE 35: CPT | Mod: GC

## 2022-03-11 PROCEDURE — 99253 IP/OBS CNSLTJ NEW/EST LOW 45: CPT | Mod: GC

## 2022-03-11 PROCEDURE — 71046 X-RAY EXAM CHEST 2 VIEWS: CPT | Mod: 26

## 2022-03-11 PROCEDURE — 99285 EMERGENCY DEPT VISIT HI MDM: CPT

## 2022-03-11 PROCEDURE — 99223 1ST HOSP IP/OBS HIGH 75: CPT | Mod: GC

## 2022-03-11 PROCEDURE — 73630 X-RAY EXAM OF FOOT: CPT | Mod: 26,RT

## 2022-03-11 RX ORDER — VANCOMYCIN HCL 1 G
1000 VIAL (EA) INTRAVENOUS ONCE
Refills: 0 | Status: COMPLETED | OUTPATIENT
Start: 2022-03-11 | End: 2022-03-11

## 2022-03-11 RX ORDER — HYDRALAZINE HCL 50 MG
10 TABLET ORAL ONCE
Refills: 0 | Status: COMPLETED | OUTPATIENT
Start: 2022-03-11 | End: 2022-03-11

## 2022-03-11 RX ORDER — INSULIN LISPRO 100/ML
VIAL (ML) SUBCUTANEOUS
Refills: 0 | Status: DISCONTINUED | OUTPATIENT
Start: 2022-03-11 | End: 2022-03-16

## 2022-03-11 RX ORDER — SODIUM CHLORIDE 9 MG/ML
1000 INJECTION INTRAMUSCULAR; INTRAVENOUS; SUBCUTANEOUS ONCE
Refills: 0 | Status: COMPLETED | OUTPATIENT
Start: 2022-03-11 | End: 2022-03-11

## 2022-03-11 RX ORDER — INSULIN GLARGINE 100 [IU]/ML
32 INJECTION, SOLUTION SUBCUTANEOUS AT BEDTIME
Refills: 0 | Status: DISCONTINUED | OUTPATIENT
Start: 2022-03-11 | End: 2022-03-16

## 2022-03-11 RX ORDER — DEXTROSE 50 % IN WATER 50 %
12.5 SYRINGE (ML) INTRAVENOUS ONCE
Refills: 0 | Status: DISCONTINUED | OUTPATIENT
Start: 2022-03-11 | End: 2022-03-16

## 2022-03-11 RX ORDER — DEXTROSE 50 % IN WATER 50 %
25 SYRINGE (ML) INTRAVENOUS ONCE
Refills: 0 | Status: DISCONTINUED | OUTPATIENT
Start: 2022-03-11 | End: 2022-03-16

## 2022-03-11 RX ORDER — METOPROLOL TARTRATE 50 MG
50 TABLET ORAL DAILY
Refills: 0 | Status: DISCONTINUED | OUTPATIENT
Start: 2022-03-12 | End: 2022-03-16

## 2022-03-11 RX ORDER — GLUCAGON INJECTION, SOLUTION 0.5 MG/.1ML
1 INJECTION, SOLUTION SUBCUTANEOUS ONCE
Refills: 0 | Status: DISCONTINUED | OUTPATIENT
Start: 2022-03-11 | End: 2022-03-16

## 2022-03-11 RX ORDER — SODIUM CHLORIDE 9 MG/ML
1000 INJECTION, SOLUTION INTRAVENOUS
Refills: 0 | Status: DISCONTINUED | OUTPATIENT
Start: 2022-03-11 | End: 2022-03-16

## 2022-03-11 RX ORDER — CLOPIDOGREL BISULFATE 75 MG/1
75 TABLET, FILM COATED ORAL DAILY
Refills: 0 | Status: DISCONTINUED | OUTPATIENT
Start: 2022-03-12 | End: 2022-03-16

## 2022-03-11 RX ORDER — ATORVASTATIN CALCIUM 80 MG/1
80 TABLET, FILM COATED ORAL AT BEDTIME
Refills: 0 | Status: DISCONTINUED | OUTPATIENT
Start: 2022-03-11 | End: 2022-03-16

## 2022-03-11 RX ORDER — PIPERACILLIN AND TAZOBACTAM 4; .5 G/20ML; G/20ML
3.38 INJECTION, POWDER, LYOPHILIZED, FOR SOLUTION INTRAVENOUS ONCE
Refills: 0 | Status: COMPLETED | OUTPATIENT
Start: 2022-03-11 | End: 2022-03-11

## 2022-03-11 RX ORDER — ASPIRIN/CALCIUM CARB/MAGNESIUM 324 MG
81 TABLET ORAL DAILY
Refills: 0 | Status: DISCONTINUED | OUTPATIENT
Start: 2022-03-12 | End: 2022-03-16

## 2022-03-11 RX ORDER — AMLODIPINE BESYLATE 2.5 MG/1
10 TABLET ORAL EVERY 24 HOURS
Refills: 0 | Status: DISCONTINUED | OUTPATIENT
Start: 2022-03-11 | End: 2022-03-16

## 2022-03-11 RX ORDER — CEFEPIME 1 G/1
1000 INJECTION, POWDER, FOR SOLUTION INTRAMUSCULAR; INTRAVENOUS ONCE
Refills: 0 | Status: DISCONTINUED | OUTPATIENT
Start: 2022-03-11 | End: 2022-03-11

## 2022-03-11 RX ORDER — DEXTROSE 50 % IN WATER 50 %
15 SYRINGE (ML) INTRAVENOUS ONCE
Refills: 0 | Status: DISCONTINUED | OUTPATIENT
Start: 2022-03-11 | End: 2022-03-16

## 2022-03-11 RX ADMIN — Medication 10 MILLIGRAM(S): at 20:11

## 2022-03-11 RX ADMIN — ATORVASTATIN CALCIUM 80 MILLIGRAM(S): 80 TABLET, FILM COATED ORAL at 22:43

## 2022-03-11 RX ADMIN — PIPERACILLIN AND TAZOBACTAM 200 GRAM(S): 4; .5 INJECTION, POWDER, LYOPHILIZED, FOR SOLUTION INTRAVENOUS at 16:30

## 2022-03-11 RX ADMIN — AMLODIPINE BESYLATE 10 MILLIGRAM(S): 2.5 TABLET ORAL at 20:11

## 2022-03-11 RX ADMIN — SODIUM CHLORIDE 2000 MILLILITER(S): 9 INJECTION INTRAMUSCULAR; INTRAVENOUS; SUBCUTANEOUS at 16:30

## 2022-03-11 RX ADMIN — PIPERACILLIN AND TAZOBACTAM 3.38 GRAM(S): 4; .5 INJECTION, POWDER, LYOPHILIZED, FOR SOLUTION INTRAVENOUS at 17:59

## 2022-03-11 RX ADMIN — Medication 250 MILLIGRAM(S): at 17:59

## 2022-03-11 RX ADMIN — SODIUM CHLORIDE 1000 MILLILITER(S): 9 INJECTION INTRAMUSCULAR; INTRAVENOUS; SUBCUTANEOUS at 17:59

## 2022-03-11 NOTE — H&P ADULT - PROBLEM SELECTOR PLAN 2
Patient with HILARY on possible CKD?. Creatinine on prior admission is 1.26-1.36, today 1.52. Patient with HILARY on possible CKD?. Creatinine on prior admission is 1.26-1.36, today 1.52.    - hold home losartan   - continue to monitor  - s/p 1L NS   - trend BUN/creatinine

## 2022-03-11 NOTE — H&P ADULT - NSHPLABSRESULTS_GEN_ALL_CORE
LABS:  cret                        11.7   14.20 )-----------( 418      ( 11 Mar 2022 16:26 )             34.6     03-11    134<L>  |  104  |  25<H>  ----------------------------<  249<H>  4.8   |  21<L>  |  1.52<H>    Ca    8.5      11 Mar 2022 16:26    TPro  6.9  /  Alb  3.2<L>  /  TBili  <0.2  /  DBili  x   /  AST  15  /  ALT  14  /  AlkPhos  115  03-11    PT/INR - ( 11 Mar 2022 16:26 )   PT: 12.4 sec;   INR: 1.04          PTT - ( 11 Mar 2022 16:26 )  PTT:29.6 sec

## 2022-03-11 NOTE — H&P ADULT - NSHPPHYSICALEXAM_GEN_ALL_CORE
Vital Signs Last 24 Hrs  T(C): 36.9 (11 Mar 2022 14:15), Max: 36.9 (11 Mar 2022 14:15)  T(F): 98.4 (11 Mar 2022 14:15), Max: 98.4 (11 Mar 2022 14:15)  HR: 75 (11 Mar 2022 14:15) (75 - 75)  BP: 182/78 (11 Mar 2022 14:15) (182/78 - 182/78)  BP(mean): --  RR: 18 (11 Mar 2022 14:15) (18 - 18)  SpO2: 99% (11 Mar 2022 14:15) (99% - 99%)    GENERAL: NAD, lying in bed comfortably  HEAD:  Atraumatic, Normocephalic  EYES: EOMI, PERRLA, conjunctiva and sclera clear  ENT: Moist mucous membranes  NECK: Supple, No JVD  CHEST/LUNG: Clear to auscultation bilaterally; No rales, rhonchi, wheezing, or rubs. Unlabored respirations  HEART: Regular rate and rhythm; No murmurs, rubs, or gallops  ABDOMEN: Bowel sounds present; Soft, Nontender, Nondistended. No hepatomegally  EXTREMITIES:  2+ Peripheral Pulses, brisk capillary refill. No clubbing, cyanosis, or edema  NERVOUS SYSTEM:  Alert & Oriented X3, speech clear. No deficits   MSK: FROM all 4 extremities, full and equal strength  SKIN: No rashes or lesions    Vasc: non palpable pulses b/l 2/2 edema. Monophasic signals to DP &PT b/l. Erythema present to right hallux.   Derm: Necrotic tissue present to right distal hallux, erythematous borders with fibrotic edges. Negative for purulence, negative for probing or undermining. Positive for malodor. Hallux nail is partially detached at distal nail border  Neuro: Protective sensation diminished  MSK: Pt denies tenderness to right hallux. MMT reveals 5/5 strength in all compartments. Vital Signs Last 24 Hrs  T(C): 36.9 (11 Mar 2022 14:15), Max: 36.9 (11 Mar 2022 14:15)  T(F): 98.4 (11 Mar 2022 14:15), Max: 98.4 (11 Mar 2022 14:15)  HR: 75 (11 Mar 2022 14:15) (75 - 75)  BP: 182/78 (11 Mar 2022 14:15) (182/78 - 182/78)  BP(mean): --  RR: 18 (11 Mar 2022 14:15) (18 - 18)  SpO2: 99% (11 Mar 2022 14:15) (99% - 99%)    GENERAL: NAD, lying in bed comfortably  HEAD:  Atraumatic, Normocephalic  EYES: EOMI, PERRLA, conjunctiva and sclera clear  ENT: Moist mucous membranes  NECK: Supple, No JVD  CHEST/LUNG: Clear to auscultation bilaterally; No rales, rhonchi, wheezing, or rubs. Unlabored respirations  HEART: Regular rate and rhythm; No murmurs, rubs, or gallops  ABDOMEN: Bowel sounds present; Soft, Nontender, Nondistended. No hepatomegally  EXTREMITIES:  2+ Peripheral Pulses radial b/l, DP pulse not able to palpate on L foot. trace pitting edema in L ankle   R foot with bandage, iodine drainage through bandage.   NERVOUS SYSTEM:  Alert & Oriented X3, speech clear. No deficits.   MSK: FROM all 4 extremities, full and equal strength  PER PODIATRY NOTE/EXAM  Derm: Necrotic tissue present to right distal hallux, erythematous borders with fibrotic edges. Negative for purulence, negative for probing or undermining. Positive for malodor. Hallux nail is partially detached at distal nail border Vital Signs Last 24 Hrs  T(C): 36.9 (11 Mar 2022 14:15), Max: 36.9 (11 Mar 2022 14:15)  T(F): 98.4 (11 Mar 2022 14:15), Max: 98.4 (11 Mar 2022 14:15)  HR: 75 (11 Mar 2022 14:15) (75 - 75)  BP: 182/78 (11 Mar 2022 14:15) (182/78 - 182/78)  BP(mean): --  RR: 18 (11 Mar 2022 14:15) (18 - 18)  SpO2: 99% (11 Mar 2022 14:15) (99% - 99%)    GENERAL: NAD, lying in bed comfortably  HEAD:  Atraumatic, Normocephalic  EYES: EOMI, PERRLA, conjunctiva and sclera clear  ENT: Moist mucous membranes  NECK: Supple, No JVD  CHEST/LUNG: Clear to auscultation bilaterally; No rales, rhonchi, wheezing, or rubs. Unlabored respirations  HEART: Regular rate and rhythm; No murmurs, rubs, or gallops  ABDOMEN: Bowel sounds present; Soft, Nontender, Nondistended. No hepatomegally  EXTREMITIES:  2+ Peripheral Pulses radial b/l, DP pulse not able to palpate on L foot. trace pitting edema in L ankle   R foot with bandage, iodine drainage through bandage.   NERVOUS SYSTEM:  Alert & Oriented X3, speech clear. No deficits. sensation intact in L foot   MSK: FROM all 4 extremities, full and equal strength  PER PODIATRY NOTE/EXAM  Derm: Necrotic tissue present to right distal hallux, erythematous borders with fibrotic edges. Negative for purulence, negative for probing or undermining. Positive for malodor. Hallux nail is partially detached at distal nail border

## 2022-03-11 NOTE — ED ADULT NURSE NOTE - NSICDXPASTMEDICALHX_GEN_ALL_CORE_FT
PAST MEDICAL HISTORY:  CAD (coronary artery disease)     Diabetes     HTN (hypertension)     Hyperlipidemia     Peripheral artery disease

## 2022-03-11 NOTE — H&P ADULT - NSHPSOCIALHISTORY_GEN_ALL_CORE
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

## 2022-03-11 NOTE — ED PROVIDER NOTE - NS ED MD TWO NIGHTS YN
INTERIM HISTORY:  Ms. Narvaez is a 45 year old female who follows up for right heel pain and swelling. She was last seen in the office in 12/19/18 where she was advised rest with a tall walking boot. Today, she presents in a regular shoe and reports no relief from using her tall waling boot. She reports that her main concern is persistent swelling in her right heel that causes pain occasionally with extended ambulation. She reports shooting pains in her right heel maybe twice a week. She reports swelling an pain aggravations most often from working her job. She has been using ibuprofen for treatment thus far. She denies having tried using shoe wear with heel lifts in them.     ROS:  She denies new onset chest pain or shortness of breath.    IMPRESSION:  1) ~2 Years Status Post (Dr. Dunn):  1.  Debridement insertional area of the right Achilles and tendon with excision of bursa and reattachment of the right Achilles tendon with two 5.0 mm Arthrex suture anchors.  2.  Excision of subAchilles bursa and Maurice's deformity.  3.  Transfer of the right flexor hallucis longus to the os calcis with internal PEEK interference screw fixation.    2) Recurrent posterior right ankle pain and swelling    PLAN:  Renewed 6 month handicap  Work Note Provided: Please allow Ludmila to wear tennis shoe at work to treat achilles tendon problem    The patient verbalizes understanding and agreement with the plan. All questions have been answered at this time.     On 3/25/2019, IApurva scribed the services personally performed by Hayden Frances MD    The documentation recorded by the scribe accurately and completely reflects the service(s) I personally performed and the decisions made by me.     Her main issue seems to be the swelling.  She does have intermittent pain though it is fairly infrequent.  Unfortunately I don't have any great intervention options for her that will reliably improve her symptoms.  Any surgical revision  would run the risk of worse pain than she is dealing with now.     Yes

## 2022-03-11 NOTE — H&P ADULT - PROBLEM SELECTOR PLAN 9
F: per PO   E: replete to K>4, Mg>2, Phos>2.5  N: diabetic diet  Ppx: lovenox     Code Status: FULL    Dispo: [facility] F: per PO   E: replete to K>4, Mg>2, Phos>2.5  N: diabetic diet  Ppx: aspirin 81     Code Status: FULL    Dispo: [facility] [[email or web address]]

## 2022-03-11 NOTE — H&P ADULT - PROBLEM SELECTOR PLAN 7
Patient with T2DM on metformin 1000 mg and Lantus 40 U at bedtime at home. Presenting with hyperglycemia likely in the setting of infection/inflammation.     - c/w Lantus 40 U at bedtime  - mISS  - f/u A1c Patient with PAD/PVD s/p angio/stent 4 days ago at [facility] and history of thrombectomy.     - c/w atorvastatin 80 mg q daily  - c/w aspirin 81 mg q daily  - c/w clopidogrel 75 mg q daily Patient with PAD/PVD s/p angio/stent 4 days ago at [facility] and history of thrombectomy.     - c/w atorvastatin 80 mg q daily  - c/w aspirin 81 mg q daily  - c/w clopidogrel 75 mg q daily  - vascular consult

## 2022-03-11 NOTE — H&P ADULT - PROBLEM SELECTOR PLAN 4
Patient with extensive CAD s/p CAGB and multiple PCIs with RAJENDRA.     - c/w atorvastatin 80 mg q daily  - c/w aspirin 81 mg q daily  - c/w clopidogrel 75 mg q daily

## 2022-03-11 NOTE — H&P ADULT - PROBLEM SELECTOR PLAN 3
Patient hypertensive on home losartan 50 mg q daily and toprol 50 mg q daily Patient hypertensive on home losartan 50 mg q daily and toprol 50 mg q daily    - c/w home toprol 50 mg q daily  - hold home losatran 50 mg q daily given HILARY   - c/w hydralazine 10 mg QiD Patient hypertensive on home losartan 50 mg q daily and toprol 50 mg q daily    - c/w home toprol 50 mg q daily  - hold home losartan 50 mg q daily given HILARY   - c/w amlodipine 10 mg BID Patient hypertensive on home losartan 50 mg q daily and toprol 50 mg q daily    - c/w home toprol 50 mg q daily  - hold home losartan 50 mg q daily given HILARY   - c/w amlodipine 10 mg q daily

## 2022-03-11 NOTE — H&P ADULT - ATTENDING COMMENTS
#Wet gangrene: c/w vanc/zosyn, podiatry consulted in ED- planned for OR debridement/possible amputation on sunday or monday. F/up bcx. Hx of PAD w/ recent RLE stent- pulses present b/l, vascular consulted- f/up recs     #PAD: f/up vascular recs, c/w asa/plavix      #T2DM: c/w home lantus, monitor fsg. F/up a1c, mISS

## 2022-03-11 NOTE — ED PROVIDER NOTE - CLINICAL SUMMARY MEDICAL DECISION MAKING FREE TEXT BOX
61 yo male with a hx of DM, PVD s/p angio/stent 4 days ago p/w R big toe discoloration and swelling- consistent with wet gangrene of R big toe with surrounding cellulitis. Will evaluate for bacteremia- infected on exam. Possible osteomyelitis. Hyperglycemic at triage- likely hyperglycemic crisis due to infection but will rule out DKA/HHS.    Plan:  - labs  - IV antibx  - imaging of R toe/foot  - podiatry c/s for eval for amputation vs debridement

## 2022-03-11 NOTE — ED PROVIDER NOTE - PHYSICAL EXAMINATION
VITAL SIGNS: I have reviewed nursing notes and confirm.  CONSTITUTIONAL: Well appearing, in no acute distress.   SKIN:  warm and dry, no acute rash.   HEAD:  normocephalic, atraumatic.  EYES: EOM intact; conjunctiva and sclera clear.  ENT: No nasal discharge; airway clear.   NECK: Supple; non tender.  CARD: S1, S2 normal; no murmurs, gallops, or rubs. Regular rate and rhythm.   RESP:  Clear to auscultation b/l, no wheezes, rales or rhonchi.  ABD: Normal bowel sounds; soft; non-distended; non-tender; no guarding/ rebound.  EXT: Normal ROM. No clubbing, cyanosis or edema. 2+ pulses to b/l ue. 1+ PT pulses to b/l le. Unable to palpate DP pulses b/l. Black discoloration of distal R big toe with surrounding edema, erythema and malodorous discharge.   NEURO: Alert, oriented, grossly unremarkable  PSYCH: Cooperative, mood and affect appropriate.

## 2022-03-11 NOTE — ED ADULT NURSE NOTE - OBJECTIVE STATEMENT
Presents for c/o discoloration fo R 1st toe x 1.5 weeks with minimal pain, loss of sensation, and malodor. Denies fevers/chills/body aches.  PMH DM2 on insulin nightly, reports bg in 160-170's.     On assessment- AOx4, breathing even and unlabored on RA, no apparent distress, VSS in triage, able to speak in clear coherent sentences, steady gait unassisted, neuro intact with no apparent facial asymmetry, PERRLA. R big toe in bandage with black tip x ~1 inch from tip, unable to ascertain cap refill, malodorous, pulses to foot intact.

## 2022-03-11 NOTE — CONSULT NOTE ADULT - SUBJECTIVE AND OBJECTIVE BOX
Vascular Attending:  Dr. Martínez    Chief Complaint: R black 1st toe.      HPI:  Patient is a 63 yo male with a hx of HTN, DM (on insulin and metformin), PVD s/p angio/stent R leg 4 days ago, known CAD s/p 3vCABG, multiple PCIs with RAJENDRA, PAD w h/o acute limb ischemia w/thrombectomy p/w R big toe discoloration and swelling. Patient was here in 11/2021 for angina and was seen by podiatry. Diagnosed with dry eschar and given 1 week follow up. Patient states that he had stent placed at [facility] in R leg on Monday (4 days ago) which he thought would improve toe, however had increasing "blackness" of toe from tip of toe down to base, malodor and throbbing since Monday.  Patient denies pain stating he has not been feeling in that toe since initial lesion formed. Patient has not been taking metformin or Lantus over past few days due to misunderstanding since stent placement, except for Lantus last night. Denies fevers, chills, fatigue, syncope, N/V/C/D. Endorses b/l tinging pain in the legs which he has had for a long time and workup has attributed to vascular disease. Reports compliance with all other medications.    In the ED,  Vitals: T 98.4 HR 75 /78 RR 18 Sa 99% RA   Labs: ESR 90 CRP 22 WBC 14.2 RBC 3.95 Hb 11.7 MCV 87.6 Plt 418 Na 134 BUN 25 Cr 1.52 Glu 249, Alb 3.2   Imaging:    CXR: WNL     XR foot: podiatry XR wet read right foot negative for soft tissue emphysema or radiographic signs of OM (osteopenia, cortical erosion, periosteal rxn)  ED Course:  s/p vanc and zosyn x1, 1L NS. podiatry consulted.    (11 Mar 2022 17:38)      PAST MEDICAL & SURGICAL HISTORY:  HTN (hypertension)    Diabetes    CAD (coronary artery disease)    Hyperlipidemia    Peripheral artery disease    S/P CABG x 3    H/O vascular surgery  R femoral thrombectomy    S/P coronary artery stent placement    Occlusion of femoral artery        REVIEW OF SYSTEMS  All negative     General:	    Skin/Breast:  	  Ophthalmologic:  	  ENMT:	    Respiratory and Thorax:  	  Cardiovascular:	    Gastrointestinal:	    Genitourinary:	    Musculoskeletal:	    Neurological:	    Psychiatric:	    Hematology/Lymphatics:	    Endocrine:	    Allergic/Immunologic:	    MEDICATIONS  (STANDING):  amLODIPine   Tablet 10 milliGRAM(s) Oral every 24 hours  aspirin enteric coated 81 milliGRAM(s) Oral daily  atorvastatin 80 milliGRAM(s) Oral at bedtime  clopidogrel Tablet 75 milliGRAM(s) Oral daily  dextrose 40% Gel 15 Gram(s) Oral once  dextrose 5%. 1000 milliLiter(s) (50 mL/Hr) IV Continuous <Continuous>  dextrose 5%. 1000 milliLiter(s) (100 mL/Hr) IV Continuous <Continuous>  dextrose 50% Injectable 25 Gram(s) IV Push once  dextrose 50% Injectable 12.5 Gram(s) IV Push once  dextrose 50% Injectable 25 Gram(s) IV Push once  glucagon  Injectable 1 milliGRAM(s) IntraMuscular once  insulin glargine Injectable (LANTUS) 32 Unit(s) SubCutaneous at bedtime  insulin lispro (ADMELOG) corrective regimen sliding scale   SubCutaneous Before meals and at bedtime  metoprolol succinate ER 50 milliGRAM(s) Oral daily    MEDICATIONS  (PRN):      Allergies    iodinated radiocontrast agents (Hives)    Intolerances        SOCIAL HISTORY:    FAMILY HISTORY:      Vital Signs Last 24 Hrs  T(C): 37.3 (11 Mar 2022 20:34), Max: 37.3 (11 Mar 2022 20:34)  T(F): 99.2 (11 Mar 2022 20:34), Max: 99.2 (11 Mar 2022 20:34)  HR: 64 (11 Mar 2022 20:34) (60 - 75)  BP: 181/73 (11 Mar 2022 20:52) (181/73 - 211/81)  BP(mean): --  RR: 18 (11 Mar 2022 20:34) (18 - 18)  SpO2: 99% (11 Mar 2022 20:34) (99% - 99%)    PHYSICAL EXAM:      Constitutional:    Eyes:    ENMT:    Neck:    Breasts:    Back:    Respiratory:    Cardiovascular:    Gastrointestinal:    Genitourinary:    Rectal:    Extremities:    Vascular:    Neurological:    Skin:    Lymph Nodes:    Musculoskeletal:    Psychiatric:        LABS:                        11.7   14.20 )-----------( 418      ( 11 Mar 2022 16:26 )             34.6     03-11    134<L>  |  104  |  25<H>  ----------------------------<  249<H>  4.8   |  21<L>  |  1.52<H>    Ca    8.5      11 Mar 2022 16:26    TPro  6.9  /  Alb  3.2<L>  /  TBili  <0.2  /  DBili  x   /  AST  15  /  ALT  14  /  AlkPhos  115  03-11    PT/INR - ( 11 Mar 2022 16:26 )   PT: 12.4 sec;   INR: 1.04          PTT - ( 11 Mar 2022 16:26 )  PTT:29.6 sec      RADIOLOGY & ADDITIONAL STUDIES Vascular Attending:  Dr. Martínez    Chief Complaint: R black 1st toe.      HPI:  Patient is a 61 yo male with a hx of HTN, DM (on insulin and metformin), PVD s/p angio/stent R leg 4 days ago, known CAD s/p 3vCABG, multiple PCIs with RAJENDRA, PAD w h/o acute limb ischemia w/thrombectomy p/w R big toe discoloration and swelling. Patient was here in 11/2021 for angina and was seen by podiatry. Diagnosed with dry eschar and given 1 week follow up. Patient states that he had stent placed at [facility] in R leg on Monday (4 days ago) which he thought would improve toe, however had increasing "blackness" of toe from tip of toe down to base, malodor and throbbing since Monday.  Patient denies pain stating he has not been feeling in that toe since initial lesion formed. Patient has not been taking metformin or Lantus over past few days due to misunderstanding since stent placement, except for Lantus last night. Denies fevers, chills, fatigue, syncope, N/V/C/D. Endorses b/l tinging pain in the legs which he has had for a long time and workup has attributed to vascular disease. Reports compliance with all other medications.    In the ED,  Vitals: T 98.4 HR 75 /78 RR 18 Sa 99% RA   Labs: ESR 90 CRP 22 WBC 14.2 RBC 3.95 Hb 11.7 MCV 87.6 Plt 418 Na 134 BUN 25 Cr 1.52 Glu 249, Alb 3.2   Imaging:    CXR: WNL     XR foot: podiatry XR wet read right foot negative for soft tissue emphysema or radiographic signs of OM (osteopenia, cortical erosion, periosteal rxn)  ED Course:  s/p vanc and zosyn x1, 1L NS. podiatry consulted.    (11 Mar 2022 17:38)      PAST MEDICAL & SURGICAL HISTORY:  HTN (hypertension)    Diabetes    CAD (coronary artery disease)    Hyperlipidemia    Peripheral artery disease    S/P CABG x 3    H/O vascular surgery  R femoral thrombectomy    S/P coronary artery stent placement    Occlusion of femoral artery        REVIEW OF SYSTEMS  All negative unless otherwise stated        MEDICATIONS  (STANDING):  amLODIPine   Tablet 10 milliGRAM(s) Oral every 24 hours  aspirin enteric coated 81 milliGRAM(s) Oral daily  atorvastatin 80 milliGRAM(s) Oral at bedtime  clopidogrel Tablet 75 milliGRAM(s) Oral daily  dextrose 40% Gel 15 Gram(s) Oral once  dextrose 5%. 1000 milliLiter(s) (50 mL/Hr) IV Continuous <Continuous>  dextrose 5%. 1000 milliLiter(s) (100 mL/Hr) IV Continuous <Continuous>  dextrose 50% Injectable 25 Gram(s) IV Push once  dextrose 50% Injectable 12.5 Gram(s) IV Push once  dextrose 50% Injectable 25 Gram(s) IV Push once  glucagon  Injectable 1 milliGRAM(s) IntraMuscular once  insulin glargine Injectable (LANTUS) 32 Unit(s) SubCutaneous at bedtime  insulin lispro (ADMELOG) corrective regimen sliding scale   SubCutaneous Before meals and at bedtime  metoprolol succinate ER 50 milliGRAM(s) Oral daily    MEDICATIONS  (PRN):      Allergies    iodinated radiocontrast agents (Hives)    Intolerances        SOCIAL HISTORY:    FAMILY HISTORY:      Vital Signs Last 24 Hrs  T(C): 37.3 (11 Mar 2022 20:34), Max: 37.3 (11 Mar 2022 20:34)  T(F): 99.2 (11 Mar 2022 20:34), Max: 99.2 (11 Mar 2022 20:34)  HR: 64 (11 Mar 2022 20:34) (60 - 75)  BP: 181/73 (11 Mar 2022 20:52) (181/73 - 211/81)  BP(mean): --  RR: 18 (11 Mar 2022 20:34) (18 - 18)  SpO2: 99% (11 Mar 2022 20:34) (99% - 99%)    PHYSICAL EXAM:      Constitutional: NAD    Respiratory: RA, non labored breathing    Cardiovascular: NSR    Gastrointestinal: soft, non distended, non tender     Extremities: R 1st toe, eschar dry gangrene at the tip of the toe down to padding of the foot. There is wet gangrene. tenderness to palpation surrounding the gangrene portio. mild erythema and edema throughout the toe and dorsum of the foot. no discharge, or purulence.     Vascular:   RLE: palpable fem, triphasic pop, PT triphasic, dp biphasic   LLE: palpable fem (tender due to previous angio on monday), triphasic pop, PT triphasic, no DP          LABS:                        11.7   14.20 )-----------( 418      ( 11 Mar 2022 16:26 )             34.6     03-11    134<L>  |  104  |  25<H>  ----------------------------<  249<H>  4.8   |  21<L>  |  1.52<H>    Ca    8.5      11 Mar 2022 16:26    TPro  6.9  /  Alb  3.2<L>  /  TBili  <0.2  /  DBili  x   /  AST  15  /  ALT  14  /  AlkPhos  115  03-11    PT/INR - ( 11 Mar 2022 16:26 )   PT: 12.4 sec;   INR: 1.04          PTT - ( 11 Mar 2022 16:26 )  PTT:29.6 sec      RADIOLOGY & ADDITIONAL STUDIES      A/P: 62yM hx of HTN, DM (on insulin and metformin), PVD s/p angio/stent R leg 4 days ago, known CAD s/p 3vCABG, multiple PCIs with RAJENDRA, PAD w h/o acute limb ischemia w/thrombectomy p/w R big toe discoloration and swelling. Patient was here in 11/2021 for angina and was seen by podiatry. Diagnosed with dry eschar and given 1 week follow up. Patient states that he had stent placed at [facility] in R leg on Monday (4 days ago) which he thought would improve toe, however had increasing "blackness" of toe from tip of toe down to base, malodor and throbbing since Monday.       Vascular/PAD:   - please obtain vascular records from outside hospital to determine where the stents were placed.   - please continue ASA /Plavix for the stents  - c/w IV abx as per podiatry   - vascular will continue to follow

## 2022-03-11 NOTE — ED ADULT NURSE NOTE - PAIN RATING/NUMBER SCALE (0-10): ACTIVITY
Patient returns for Valentine removal  Valentine removed without difficulty, patient tolerated procedure well  Urine draining clear Denies fever or urinary symptoms  Patient instructed to increase fluids and limit caffeine intake  To return to office if unable to void within 4-6 hours, or has increased discomfort  2

## 2022-03-11 NOTE — ED PROVIDER NOTE - OBJECTIVE STATEMENT
63 yo male with a hx of DM, PVD s/p angio/stent 4 days ago p/w R big toe discoloration and swelling. Denies pain- says he has not been feeling anything in that toe for a while. Reports an area of black discoloration of the tip of the R big toe that worsened over the last two days. Denies fevers, chills, fatigue, syncope. No other symptoms. Says his sugars have also been high (~160s) over the last few days. Reports compliance with his medications.

## 2022-03-11 NOTE — H&P ADULT - PROBLEM SELECTOR PLAN 8
F: per PO   E: replete to K>4, Mg>2, Phos>2.5  N: diabetic diet  Ppx: lovenox     Code Status: FULL    Dispo: [facility] Patient with T2DM on metformin 1000 mg and Lantus 40 U at bedtime at home. Presenting with hyperglycemia likely in the setting of infection/inflammation vs missed Lantus/metformin doses over past few days.     - c/w Lantus 32 U at bedtime (80% of home dose) + 5U lispro   - readjust dosing as needed based on mISS requirements tomorrow.   - mISS   - f/u A1c Patient with T2DM on metformin 1000 mg and Lantus 40 U at bedtime at home. Presenting with hyperglycemia likely in the setting of infection/inflammation vs missed Lantus/metformin doses over past few days.     - c/w Lantus 32 U at bedtime (80% of home dose)   - readjust dosing as needed based on mISS requirements tomorrow.   - mISS   - f/u A1c

## 2022-03-11 NOTE — H&P ADULT - HISTORY OF PRESENT ILLNESS
Patient is a 63 yo male with a hx of DM, PVD s/p angio/stent 4 days ago p/w R big toe discoloration and swelling. Denies pain- says he has not been feeling anything in that toe for a while. Reports an area of black discoloration of the tip of the R big toe that worsened over the last two days. Denies fevers, chills, fatigue, syncope. No other symptoms. Says his sugars have also been high (~160s) over the last few days. Reports compliance with his medications. Patient is a 63 yo male with a hx of HTN, DM (on insulin and metformin), PVD s/p angio/stent 4 days ago, known CAD s/p 3vCABG, multiple PCIs with RAJENDRA, PAD w h/o acute limb ischemia w/thrombectomy p/w R big toe discoloration and swelling. Patient was here in 11/2021 for angina and was seen by podiatry. Diagnosed with dry eschar and given 1 week follow up. Patient denies pain- says he has not been feeling anything in that toe for a while. Reports an area of black discoloration of the tip of the R big toe that worsened over the last two days. Denies fevers, chills, fatigue, syncope. No other symptoms. Says his sugars have also been high (~160s) over the last few days. Reports compliance with his medications.    In the ED,  Vitals: T 98.4 HR 75 /78 RR 18 Sa 99% RA   Labs: ESR 90 CRP 22 WBC 14.2 RBC 3.95 Hb 11.7 MCV 87.6 Plt 418 Na 134 BUN 25 Cr 1.52 Glu 249, Alb 3.2   Imaging:    EKG:    CXR: WNL     XR foot: podiatry XR wet read right foot negative for soft tissue emphysema or radiographic signs of OM (osteopenia, cortical erosion, periosteal rxn)  ED Course:  s/p vanc and zosyn x1, 1L NS. podiatry consulted.    Patient is a 63 yo male with a hx of HTN, DM (on insulin and metformin), PVD s/p angio/stent 4 days ago, known CAD s/p 3vCABG, multiple PCIs with RAJENDRA, PAD w h/o acute limb ischemia w/thrombectomy p/w R big toe discoloration and swelling. Patient was here in 11/2021 for angina and was seen by podiatry. Diagnosed with dry eschar and given 1 week follow up. Patient states that he had stent placed at [facility] in R leg on Monday (4 days ago) which he thought would improve toe, however had increasing "blackness" of toe from tip of toe down to base, malodor and thrombbing since Monday.  Patient denies pain stating he has not been feeling in that toe since initial lesion formed. Patient has not been taking metformin or Lantus over past few days due to misunderstanding since Monday, except for Lantus last night. Denies fevers, chills, fatigue, syncope, N/V/C/D. Endorses b/l tinging pain in the legs which he has had for a long time and workup ahs attributed to vascular disease. Says his sugars have also been high (~160s) over the last few days. Reports compliance with his medications.    In the ED,  Vitals: T 98.4 HR 75 /78 RR 18 Sa 99% RA   Labs: ESR 90 CRP 22 WBC 14.2 RBC 3.95 Hb 11.7 MCV 87.6 Plt 418 Na 134 BUN 25 Cr 1.52 Glu 249, Alb 3.2   Imaging:    CXR: WNL     XR foot: podiatry XR wet read right foot negative for soft tissue emphysema or radiographic signs of OM (osteopenia, cortical erosion, periosteal rxn)  ED Course:  s/p vanc and zosyn x1, 1L NS. podiatry consulted.    Patient is a 63 yo male with a hx of HTN, DM (on insulin and metformin), PVD s/p angio/stent R leg 4 days ago, known CAD s/p 3vCABG, multiple PCIs with RAJENDRA, PAD w h/o acute limb ischemia w/thrombectomy p/w R big toe discoloration and swelling. Patient was here in 11/2021 for angina and was seen by podiatry. Diagnosed with dry eschar and given 1 week follow up. Patient states that he had stent placed at [facility] in R leg on Monday (4 days ago) which he thought would improve toe, however had increasing "blackness" of toe from tip of toe down to base, malodor and throbbing since Monday.  Patient denies pain stating he has not been feeling in that toe since initial lesion formed. Patient has not been taking metformin or Lantus over past few days due to misunderstanding since stent placement, except for Lantus last night. Denies fevers, chills, fatigue, syncope, N/V/C/D. Endorses b/l tinging pain in the legs which he has had for a long time and workup has attributed to vascular disease. Reports compliance with all other medications.    In the ED,  Vitals: T 98.4 HR 75 /78 RR 18 Sa 99% RA   Labs: ESR 90 CRP 22 WBC 14.2 RBC 3.95 Hb 11.7 MCV 87.6 Plt 418 Na 134 BUN 25 Cr 1.52 Glu 249, Alb 3.2   Imaging:    CXR: WNL     XR foot: podiatry XR wet read right foot negative for soft tissue emphysema or radiographic signs of OM (osteopenia, cortical erosion, periosteal rxn)  ED Course:  s/p vanc and zosyn x1, 1L NS. podiatry consulted.

## 2022-03-11 NOTE — ED ADULT NURSE NOTE - NS PRO PASSIVE SMOKE EXP
CPK still pending.    Last Lipid panel is in your in basket, please review and advise when it would be appropriate to have Lipid panel drawn.    No

## 2022-03-11 NOTE — H&P ADULT - PROBLEM SELECTOR PLAN 1
Patient presenting with worsening discoloration and swelling of toe. Patient with known discoloration of toe since 11/2021. PMH of diabetes and PVD. XR foot wet read negative for soft tissue emphysema or radiographic signs of OM.     - podiatry consulted, appreciate recs  - f/u official XR read  - f/u BCx  - c/w cefepime and vancomycin IV q daily Patient presenting with worsening discoloration and swelling of toe. Patient with known discoloration of toe since 11/2021. PMH of diabetes and PVD. XR foot wet read negative for soft tissue emphysema or radiographic signs of OM.     - podiatry consulted, appreciate recs  - f/u official XR read  - f/u BCx  - c/w cefepime and vancomycin IV q daily  - vascular consult Patient presenting with worsening discoloration and swelling of toe. Patient with known discoloration of toe since 11/2021. PMH of diabetes and PVD. XR foot wet read negative for soft tissue emphysema or radiographic signs of OM.     - podiatry consulted, appreciate recs  - f/u official XR read  - f/u BCx  - c/w zosyn and vancomycin IV q daily  - vascular consult

## 2022-03-11 NOTE — CONSULT NOTE ADULT - SUBJECTIVE AND OBJECTIVE BOX
Attending:    Patient is a 62y old  Male who presents with a chief complaint of     HPI:  61 yo male with a hx of DM, PVD s/p angio/stent 4 days ago (at [facility]) p/w R big toe discoloration and swelling. Denies pain- says he has not been feeling anything in that toe for a while. Reports an area of black discoloration of the tip of the R big toe that worsened over the last two days, started 2 weeks ago. Denies fevers, chills, fatigue, syncope. Podiatry consulted to evaluate for wet gangrene.  Denies any other pedal complaints at this time.        Review of systems negative except per HPI and as stated below  General:	 no weakness; no fevers, no chills  Skin/Breast: no rash  Respiratory and Thorax: no SOB, no cough  Cardiovascular:	No chest pain  Gastrointestinal:	 no nausea, vomiting , diarrhea  Genitourinary:	no dysuria, no difficulty urinating, no hematuria  Musculoskeletal:	no weakness, no joint swelling/pain  Neurological:	no focal weakness/numbness  Endocrine:	no polyuria, no polydipsia    PAST MEDICAL & SURGICAL HISTORY:  HTN (hypertension)    Diabetes    CAD (coronary artery disease)    Hyperlipidemia    Peripheral artery disease    S/P CABG x 3    H/O vascular surgery  R femoral thrombectomy    S/P coronary artery stent placement    Occlusion of femoral artery      Home Medications:  Lantus Solostar Pen 100 units/mL subcutaneous solution: 40 unit(s) subcutaneous once a day (at bedtime) (01 Nov 2021 18:20)  metFORMIN 1000 mg oral tablet: 1 tab(s) orally once a day (01 Nov 2021 18:20)  Toprol-XL 50 mg oral tablet, extended release: 1 tab(s) orally once a day (01 Nov 2021 18:20)    Allergies    iodinated radiocontrast agents (Hives)    Intolerances      FAMILY HISTORY:    Social History:       LABS                        11.7   14.20 )-----------( 418      ( 11 Mar 2022 16:26 )             34.6     03-11    134<L>  |  104  |  25<H>  ----------------------------<  249<H>  4.8   |  21<L>  |  1.52<H>    Ca    8.5      11 Mar 2022 16:26    TPro  6.9  /  Alb  3.2<L>  /  TBili  <0.2  /  DBili  x   /  AST  15  /  ALT  14  /  AlkPhos  115  03-11    PT/INR - ( 11 Mar 2022 16:26 )   PT: 12.4 sec;   INR: 1.04          PTT - ( 11 Mar 2022 16:26 )  PTT:29.6 sec    Vital Signs Last 24 Hrs  T(C): 36.9 (11 Mar 2022 14:15), Max: 36.9 (11 Mar 2022 14:15)  T(F): 98.4 (11 Mar 2022 14:15), Max: 98.4 (11 Mar 2022 14:15)  HR: 75 (11 Mar 2022 14:15) (75 - 75)  BP: 182/78 (11 Mar 2022 14:15) (182/78 - 182/78)  BP(mean): --  RR: 18 (11 Mar 2022 14:15) (18 - 18)  SpO2: 99% (11 Mar 2022 14:15) (99% - 99%)    PHYSICAL EXAM  General: NAD, AA0x3    Lower Extremity Focused:  Vasc: non palpable pulses b/l 2/2 edema. Monophasic signals to DP &PT b/l. Erythema present to right hallux.   Derm: Necrotic tissue present to right distal hallux, erythematous borders with fibrotic edges. Negative for purulence, negative for probing or undermining. Positive for malodor. Hallux nail is partially detached at distal nail border  Neuro: Protective sensation diminished  MSK: Pt denies tenderness to right hallux. MMT reveals 5/5 strength in all compartments.     RADIOLOGY  XR wet read right foot negative for soft tissue emphysema or radiographic signs of OM (osteopenia, cortical erosion, periosteal rxn) at this time.

## 2022-03-11 NOTE — CONSULT NOTE ADULT - ATTENDING COMMENTS
Patient seen and examined.  Recent intervention at OSH for PAD.  Has palpable PT pulse 2+ on affected leg.  Recommend management as per podiatry, and f/u either with me or his regular vascular surgeon for monitoring.

## 2022-03-11 NOTE — ED ADULT NURSE NOTE - SKIN CAPILLARY REFILL
[FreeTextEntry1] : Gen: Patient is A&O x 3, NAD\par HEENT: EOMI, hearing grossly normal\par Resp: regular, non - labored\par CV: pulses regular\par Skin: no rashes, erythema\par Lymph: no clubbing, cyanosis, edema, or palpable lymphadenopathy\par Inspection: no instability or misalignment\par ROM: full throughout\par Palpation: TTP left PSIS\par Sensation: intact to light touch\par Reflexes: 1+ and symmetric throughout\par Strength: 5/5 throughout\par Special tests: -Straight leg raise \par Gait: normal, non-antalgic\par \par Osteopathic Structural Exam:\par \par Rib: Left posterior Ribs 12\par Lumbar: Left L5 tenderpoint\par Pelvis: Left medial innominate\par Sacrum: Left sacral torsion \par Lower Extremity: Left lower extremity restricted in internal rotation \par \par \par  2 seconds or less

## 2022-03-11 NOTE — H&P ADULT - PROBLEM SELECTOR PLAN 6
Patient with PAD/PVD s/p angio/stent 4 days ago at [facility] and history of thrombectomy.     - c/w atorvastatin 80 mg q daily  - c/w aspirin 81 mg q daily  - c/w clopidogrel 75 mg q daily Patient with normocytic anemia hgb of 11.7. Trending laterally since last admission. No sign of obvious bleed.     - f/u iron studies  - continue to monitor  - transfuse Hgb <8 given cardiac history  - maintain active T&S

## 2022-03-11 NOTE — H&P ADULT - ASSESSMENT
Patient is a 61 yo male with a hx of HTN, DM (on insulin and metformin), PVD s/p angio/stent 4 days ago, known CAD s/p 3vCABG, multiple PCIs with RAJENDRA, PAD w h/o acute limb ischemia w/thrombectomy p/w R big toe discoloration and swelling, found to have possible wet gangrene, admitted for IV antibiotic management and possible toe amputation.

## 2022-03-12 ENCOUNTER — TRANSCRIPTION ENCOUNTER (OUTPATIENT)
Age: 62
End: 2022-03-12

## 2022-03-12 DIAGNOSIS — E11.65 TYPE 2 DIABETES MELLITUS WITH HYPERGLYCEMIA: ICD-10-CM

## 2022-03-12 DIAGNOSIS — N17.0 ACUTE KIDNEY FAILURE WITH TUBULAR NECROSIS: ICD-10-CM

## 2022-03-12 LAB
A1C WITH ESTIMATED AVERAGE GLUCOSE RESULT: 8.5 % — HIGH (ref 4–5.6)
ANION GAP SERPL CALC-SCNC: 7 MMOL/L — SIGNIFICANT CHANGE UP (ref 5–17)
APTT BLD: 30.4 SEC — SIGNIFICANT CHANGE UP (ref 27.5–35.5)
BASOPHILS # BLD AUTO: 0.07 K/UL — SIGNIFICANT CHANGE UP (ref 0–0.2)
BASOPHILS NFR BLD AUTO: 0.7 % — SIGNIFICANT CHANGE UP (ref 0–2)
BLD GP AB SCN SERPL QL: NEGATIVE — SIGNIFICANT CHANGE UP
BUN SERPL-MCNC: 25 MG/DL — HIGH (ref 7–23)
CALCIUM SERPL-MCNC: 8 MG/DL — LOW (ref 8.4–10.5)
CHLORIDE SERPL-SCNC: 106 MMOL/L — SIGNIFICANT CHANGE UP (ref 96–108)
CO2 SERPL-SCNC: 21 MMOL/L — LOW (ref 22–31)
CREAT SERPL-MCNC: 1.52 MG/DL — HIGH (ref 0.5–1.3)
EGFR: 51 ML/MIN/1.73M2 — LOW
EOSINOPHIL # BLD AUTO: 0.54 K/UL — HIGH (ref 0–0.5)
EOSINOPHIL NFR BLD AUTO: 5.1 % — SIGNIFICANT CHANGE UP (ref 0–6)
ESTIMATED AVERAGE GLUCOSE: 197 MG/DL — HIGH (ref 68–114)
GLUCOSE BLDC GLUCOMTR-MCNC: 138 MG/DL — HIGH (ref 70–99)
GLUCOSE BLDC GLUCOMTR-MCNC: 167 MG/DL — HIGH (ref 70–99)
GLUCOSE BLDC GLUCOMTR-MCNC: 180 MG/DL — HIGH (ref 70–99)
GLUCOSE BLDC GLUCOMTR-MCNC: 295 MG/DL — HIGH (ref 70–99)
GLUCOSE BLDC GLUCOMTR-MCNC: 348 MG/DL — HIGH (ref 70–99)
GLUCOSE SERPL-MCNC: 195 MG/DL — HIGH (ref 70–99)
HCT VFR BLD CALC: 31.2 % — LOW (ref 39–50)
HGB BLD-MCNC: 9.9 G/DL — LOW (ref 13–17)
IMM GRANULOCYTES NFR BLD AUTO: 0.8 % — SIGNIFICANT CHANGE UP (ref 0–1.5)
INR BLD: 1.03 — SIGNIFICANT CHANGE UP (ref 0.88–1.16)
LYMPHOCYTES # BLD AUTO: 1.43 K/UL — SIGNIFICANT CHANGE UP (ref 1–3.3)
LYMPHOCYTES # BLD AUTO: 13.5 % — SIGNIFICANT CHANGE UP (ref 13–44)
MAGNESIUM SERPL-MCNC: 2.1 MG/DL — SIGNIFICANT CHANGE UP (ref 1.6–2.6)
MCHC RBC-ENTMCNC: 28.2 PG — SIGNIFICANT CHANGE UP (ref 27–34)
MCHC RBC-ENTMCNC: 31.7 GM/DL — LOW (ref 32–36)
MCV RBC AUTO: 88.9 FL — SIGNIFICANT CHANGE UP (ref 80–100)
MONOCYTES # BLD AUTO: 1.14 K/UL — HIGH (ref 0–0.9)
MONOCYTES NFR BLD AUTO: 10.8 % — SIGNIFICANT CHANGE UP (ref 2–14)
NEUTROPHILS # BLD AUTO: 7.3 K/UL — SIGNIFICANT CHANGE UP (ref 1.8–7.4)
NEUTROPHILS NFR BLD AUTO: 69.1 % — SIGNIFICANT CHANGE UP (ref 43–77)
NRBC # BLD: 0 /100 WBCS — SIGNIFICANT CHANGE UP (ref 0–0)
PHOSPHATE SERPL-MCNC: 3.6 MG/DL — SIGNIFICANT CHANGE UP (ref 2.5–4.5)
PLATELET # BLD AUTO: 323 K/UL — SIGNIFICANT CHANGE UP (ref 150–400)
POTASSIUM SERPL-MCNC: 4.5 MMOL/L — SIGNIFICANT CHANGE UP (ref 3.5–5.3)
POTASSIUM SERPL-SCNC: 4.5 MMOL/L — SIGNIFICANT CHANGE UP (ref 3.5–5.3)
PROTHROM AB SERPL-ACNC: 12.3 SEC — SIGNIFICANT CHANGE UP (ref 10.5–13.4)
RBC # BLD: 3.51 M/UL — LOW (ref 4.2–5.8)
RBC # FLD: 11.9 % — SIGNIFICANT CHANGE UP (ref 10.3–14.5)
RH IG SCN BLD-IMP: POSITIVE — SIGNIFICANT CHANGE UP
SODIUM SERPL-SCNC: 134 MMOL/L — LOW (ref 135–145)
WBC # BLD: 10.56 K/UL — HIGH (ref 3.8–10.5)
WBC # FLD AUTO: 10.56 K/UL — HIGH (ref 3.8–10.5)

## 2022-03-12 PROCEDURE — 99233 SBSQ HOSP IP/OBS HIGH 50: CPT | Mod: GC

## 2022-03-12 RX ORDER — VANCOMYCIN HCL 1 G
1250 VIAL (EA) INTRAVENOUS ONCE
Refills: 0 | Status: DISCONTINUED | OUTPATIENT
Start: 2022-03-12 | End: 2022-03-12

## 2022-03-12 RX ORDER — PIPERACILLIN AND TAZOBACTAM 4; .5 G/20ML; G/20ML
2.25 INJECTION, POWDER, LYOPHILIZED, FOR SOLUTION INTRAVENOUS EVERY 6 HOURS
Refills: 0 | Status: DISCONTINUED | OUTPATIENT
Start: 2022-03-12 | End: 2022-03-12

## 2022-03-12 RX ORDER — VANCOMYCIN HCL 1 G
1000 VIAL (EA) INTRAVENOUS EVERY 24 HOURS
Refills: 0 | Status: COMPLETED | OUTPATIENT
Start: 2022-03-12 | End: 2022-03-13

## 2022-03-12 RX ORDER — INFLUENZA VIRUS VACCINE 15; 15; 15; 15 UG/.5ML; UG/.5ML; UG/.5ML; UG/.5ML
0.5 SUSPENSION INTRAMUSCULAR ONCE
Refills: 0 | Status: COMPLETED | OUTPATIENT
Start: 2022-03-12 | End: 2022-03-12

## 2022-03-12 RX ORDER — PIPERACILLIN AND TAZOBACTAM 4; .5 G/20ML; G/20ML
3.38 INJECTION, POWDER, LYOPHILIZED, FOR SOLUTION INTRAVENOUS EVERY 6 HOURS
Refills: 0 | Status: DISCONTINUED | OUTPATIENT
Start: 2022-03-12 | End: 2022-03-14

## 2022-03-12 RX ORDER — PIPERACILLIN AND TAZOBACTAM 4; .5 G/20ML; G/20ML
2.25 INJECTION, POWDER, LYOPHILIZED, FOR SOLUTION INTRAVENOUS ONCE
Refills: 0 | Status: DISCONTINUED | OUTPATIENT
Start: 2022-03-12 | End: 2022-03-12

## 2022-03-12 RX ADMIN — PIPERACILLIN AND TAZOBACTAM 200 GRAM(S): 4; .5 INJECTION, POWDER, LYOPHILIZED, FOR SOLUTION INTRAVENOUS at 18:05

## 2022-03-12 RX ADMIN — Medication 50 MILLIGRAM(S): at 05:16

## 2022-03-12 RX ADMIN — PIPERACILLIN AND TAZOBACTAM 200 GRAM(S): 4; .5 INJECTION, POWDER, LYOPHILIZED, FOR SOLUTION INTRAVENOUS at 12:17

## 2022-03-12 RX ADMIN — ATORVASTATIN CALCIUM 80 MILLIGRAM(S): 80 TABLET, FILM COATED ORAL at 22:12

## 2022-03-12 RX ADMIN — PIPERACILLIN AND TAZOBACTAM 200 GRAM(S): 4; .5 INJECTION, POWDER, LYOPHILIZED, FOR SOLUTION INTRAVENOUS at 05:16

## 2022-03-12 RX ADMIN — INSULIN GLARGINE 32 UNIT(S): 100 INJECTION, SOLUTION SUBCUTANEOUS at 22:13

## 2022-03-12 RX ADMIN — CLOPIDOGREL BISULFATE 75 MILLIGRAM(S): 75 TABLET, FILM COATED ORAL at 12:18

## 2022-03-12 RX ADMIN — INSULIN GLARGINE 32 UNIT(S): 100 INJECTION, SOLUTION SUBCUTANEOUS at 00:27

## 2022-03-12 RX ADMIN — Medication 4: at 00:27

## 2022-03-12 RX ADMIN — Medication 2: at 22:12

## 2022-03-12 RX ADMIN — Medication 81 MILLIGRAM(S): at 12:18

## 2022-03-12 RX ADMIN — AMLODIPINE BESYLATE 10 MILLIGRAM(S): 2.5 TABLET ORAL at 16:46

## 2022-03-12 RX ADMIN — Medication 250 MILLIGRAM(S): at 16:46

## 2022-03-12 NOTE — PROGRESS NOTE ADULT - PROBLEM SELECTOR PLAN 3
Patient hypertensive on home losartan 50 mg q daily and toprol 50 mg q daily    - c/w home toprol 50 mg q daily  - hold home losartan 50 mg q daily given HILARY   - c/w amlodipine 10 mg q daily

## 2022-03-12 NOTE — PATIENT PROFILE ADULT - FALL HARM RISK - UNIVERSAL INTERVENTIONS
Bed in lowest position, wheels locked, appropriate side rails in place/Call bell, personal items and telephone in reach/Instruct patient to call for assistance before getting out of bed or chair/Non-slip footwear when patient is out of bed/[place] to call system/Physically safe environment - no spills, clutter or unnecessary equipment/Purposeful Proactive Rounding/Room/bathroom lighting operational, light cord in reach

## 2022-03-12 NOTE — PROGRESS NOTE ADULT - ASSESSMENT
62yM hx of HTN, DM (on insulin and metformin), PVD s/p angio/stent R leg 3/7/22, known CAD s/p 3vCABG, multiple PCIs with RAJENDRA, PAD w h/o acute limb ischemia w/thrombectomy p/w R big toe discoloration and swelling. Patient was here in 11/2021 for angina and was seen by podiatry. Diagnosed with dry eschar and given 1 week follow up. Patient states that he had stent placed at [facility] in R leg on 3/7/22 which he thought would improve toe, however had increasing "blackness" of toe from tip of toe down to base. Physical exam consistent with wet/dry gangrene. WBC improved today. Improving pain. Receiving IV vanc/zosyn as well as diabetes control.     Recommendations:  - Vascular duplex unnecessary at this time  - please obtain vascular records from outside hospital to determine where the stents were placed.   - please continue ASA /Plavix for the stents  - c/w IV abx as per podiatry   - Team 3c Vascular will continue to follow

## 2022-03-12 NOTE — PROGRESS NOTE ADULT - ASSESSMENT
63 yo male with a hx of DM, PVD s/p angio/stent 4 days ago (at [facility]) p/w R big toe discoloration and swelling.  Podiatry consulted to evaluate for wet gangrene. Of note, pt is afebrile, VSS, with  WBC dowtrending. Clinical image of right hallux highly suspicious of wet gangrene.     Plan:  - Pt evaluated and chart reviewed  - Cont broad spectrum IV ABX  - plan for OR, debridement of gangrenous tissue possible hallux amputation as add on for either 3/13 or 3/14-pending discussion with attending   - hali heel WB in surgical shoe  - Local wound care: betadine DSD  - rest of care per primary team    Podiatry following.  61 yo male with a hx of DM, PVD s/p angio/stent 4 days ago (at [facility]) p/w R big toe discoloration and swelling.  Podiatry consulted to evaluate for wet gangrene. Of note, pt is afebrile, VSS, with  WBC dowtrending. Clinical image of right hallux highly suspicious of wet gangrene.     Plan:  - Pt evaluated and chart reviewed  - Cont broad spectrum IV ABX  - Pt added on to OR schedule for RIGHT hallux amputation for 3/13    - Consent for procedure obtained and placed in pts chart   - Please medically optimize pt for OR  - NPO @ midnight   - Please obtain all pre-operative labs and ensure pt has active type and screen   - recc heel WB in surgical shoe  - Local wound care: betadine DSD  - rest of care per primary team    Podiatry following.

## 2022-03-12 NOTE — PROGRESS NOTE ADULT - PROBLEM SELECTOR PLAN 6
Patient with normocytic anemia hgb of 11.7. Trending laterally since last admission. No sign of obvious bleed.     - f/u iron studies  - continue to monitor  - transfuse Hgb <8 given cardiac history  - maintain active T&S

## 2022-03-12 NOTE — PROGRESS NOTE ADULT - SUBJECTIVE AND OBJECTIVE BOX
Subjective: Patient seen and evaluated. Patient says that he is doing well. He notes that his R foot/toe pain has improved with medications.       Social   piperacillin/tazobactam IVPB.. 3.375  vancomycin  IVPB 1000  amLODIPine   Tablet 10  aspirin enteric coated 81  clopidogrel Tablet 75  metoprolol succinate ER 50  piperacillin/tazobactam IVPB.. 3.375  vancomycin  IVPB 1000      Allergies    iodinated radiocontrast agents (Hives)    Intolerances        Vital Signs Last 24 Hrs  T(C): 36.9 (12 Mar 2022 09:00), Max: 37.3 (11 Mar 2022 20:34)  T(F): 98.4 (12 Mar 2022 09:00), Max: 99.2 (11 Mar 2022 20:34)  HR: 63 (12 Mar 2022 09:00) (60 - 75)  BP: 147/76 (12 Mar 2022 09:00) (147/76 - 211/81)  BP(mean): --  RR: 18 (12 Mar 2022 09:00) (18 - 19)  SpO2: 97% (12 Mar 2022 09:00) (97% - 99%)  I&O's Summary    12 Mar 2022 06:01  -  12 Mar 2022 13:46  --------------------------------------------------------  IN: 100 mL / OUT: 0 mL / NET: 100 mL        Physical Exam:  General: NAD, resting comfortably in bed  Pulmonary: no acute respiratory distress, no accessory muscle use, speaking in complete sentences  Cardiovascular: normal rate per chart review  Extremities: R 1st toe dry gangrene at the tip and medially. Wet gangrene over pad of the great toe. Mild tenderness to palpation surrounding gangrenous portion. Mild erythema and edema throughout the toe and dorsum of the foot. Bilateral lower shin hyper-pigmentation.   Pulses: Palpable R PT pulse, biphasic R DP pulse. Triphasic L PT signal.       LABS:                        9.9    10.56 )-----------( 323      ( 12 Mar 2022 05:56 )             31.2     03-12    134<L>  |  106  |  25<H>  ----------------------------<  195<H>  4.5   |  21<L>  |  1.52<H>    Ca    8.0<L>      12 Mar 2022 05:56  Phos  3.6     03-12  Mg     2.1     03-12    TPro  6.9  /  Alb  3.2<L>  /  TBili  <0.2  /  DBili  x   /  AST  15  /  ALT  14  /  AlkPhos  115  03-11    PT/INR - ( 12 Mar 2022 10:26 )   PT: 12.3 sec;   INR: 1.03          PTT - ( 12 Mar 2022 10:26 )  PTT:30.4 sec    Radiology and Additional Studies:    A/P: 62y YO Male      Vascular/PAD:      HTN/HLD:    CAD/CHF:     DM:    CKD:     Anemia:     ID:    Diet:     Activity:     DVTPPx:     Dispo:

## 2022-03-12 NOTE — PROGRESS NOTE ADULT - ASSESSMENT
Patient is a 63 yo male with a hx of HTN, DM (on insulin and metformin), PVD s/p angio/stent 4 days ago, known CAD s/p 3vCABG, multiple PCIs with RAJENDRA, PAD w h/o acute limb ischemia w/thrombectomy p/w R big toe discoloration and swelling, found to have possible wet gangrene, admitted for IV antibiotic management and possible toe amputation.

## 2022-03-12 NOTE — PROGRESS NOTE ADULT - PROBLEM SELECTOR PLAN 9
F: per PO   E: replete to K>4, Mg>2, Phos>2.5  N: diabetic diet  Ppx: aspirin 81     Code Status: FULL    Dispo: [facility]

## 2022-03-12 NOTE — CHART NOTE - NSCHARTNOTEFT_GEN_A_CORE
Medical Preoperative Clearance Note for RIGHT hallux amputation:  - Nova Score 1.1% risk of MI or cardiac arrest, intraoperatively or up to 30 days post-op  - RCRI Class III risk (10.1% 30-day risk of death, MI, or cardiac arrest)  - DASI Score 24.7 (5.78 METS)  - NSQIP 15.3% risk serious complication  - EKG: normal sinus rhythm, HR 62, QTc 442ms    Patient is moderate risk for low-moderate risk surgery. Medical Preoperative Clearance Note for RIGHT hallux amputation:  - Nova Score 1.1% risk of MI or cardiac arrest, intraoperatively or up to 30 days post-op  - RCRI Class III risk (10.1% 30-day risk of death, MI, or cardiac arrest)  - DASI Score 24.7 (5.78 METS)  - NSQIP 15.3% risk serious complication  - EKG: normal sinus rhythm, HR 62, QTc 442ms    Given elevated risk for perioperative cardiac event and co-morbid history as above will consult Cardiology for preop clearance

## 2022-03-12 NOTE — PROGRESS NOTE ADULT - SUBJECTIVE AND OBJECTIVE BOX
Patient is a 62y old  Male who presents with a chief complaint of Toe pain (12 Mar 2022 11:36)      INTERVAL HPI/ OVERNIGHT EVENTS: Pt seen and evaluated bedside this AM. Pts dressing was C/D/I. Pt has no pedal complaints this AM.       LABS                        9.9    10.56 )-----------( 323      ( 12 Mar 2022 05:56 )             31.2     03-12    134<L>  |  106  |  25<H>  ----------------------------<  195<H>  4.5   |  21<L>  |  1.52<H>    Ca    8.0<L>      12 Mar 2022 05:56  Phos  3.6     03-12  Mg     2.1     03-12    TPro  6.9  /  Alb  3.2<L>  /  TBili  <0.2  /  DBili  x   /  AST  15  /  ALT  14  /  AlkPhos  115  03-11    PT/INR - ( 12 Mar 2022 10:26 )   PT: 12.3 sec;   INR: 1.03          PTT - ( 12 Mar 2022 10:26 )  PTT:30.4 sec  ESR: 90  CRP: --  03-11 @ 16:26    ICU Vital Signs Last 24 Hrs  T(C): 36.9 (12 Mar 2022 09:00), Max: 37.3 (11 Mar 2022 20:34)  T(F): 98.4 (12 Mar 2022 09:00), Max: 99.2 (11 Mar 2022 20:34)  HR: 63 (12 Mar 2022 09:00) (60 - 75)  BP: 147/76 (12 Mar 2022 09:00) (147/76 - 211/81)  BP(mean): --  ABP: --  ABP(mean): --  RR: 18 (12 Mar 2022 09:00) (18 - 19)  SpO2: 97% (12 Mar 2022 09:00) (97% - 99%)      PHYSICAL EXAM  General: NAD, AA0x3  Lower Extremity Focused:  Vasc: non palpable pulses b/l 2/2 edema. Monophasic signals to DP &PT b/l. Erythema present to right hallux.   Derm: Necrotic tissue present to right distal hallux, erythematous borders with fibrotic edges. Negative for purulence, negative for probing or undermining. Positive for malodor. Hallux nail is partially detached at distal nail border  Neuro: Protective sensation diminished  MSK: Pt denies tenderness to right hallux. MMT reveals 5/5 strength in all compartments.     RADIOLOGY  < from: Xray Foot AP + Lateral + Oblique, Right (03.11.22 @ 16:30) >  FINDINGS:  Frontal, lateral and oblique views of the right foot shows & 3 views of   the great toe show no evidence for acute fracture, subluxation or   dislocation. Moderate soft tissue swelling surrounding the great toe. No   evidence of osteomyelitis. Consider MRI as clinically warranted. Mild   vascular calcifications.    IMPRESSION:  Soft tissue swelling surrounding the great toe.    --- End of Report ---    < end of copied text >

## 2022-03-12 NOTE — PROGRESS NOTE ADULT - PROBLEM SELECTOR PLAN 8
Patient with T2DM on metformin 1000 mg and Lantus 40 U at bedtime at home. Presenting with hyperglycemia likely in the setting of infection/inflammation vs missed Lantus/metformin doses over past few days.     - c/w Lantus 32 U at bedtime (80% of home dose)   - readjust dosing as needed based on mISS requirements tomorrow.   - mISS   - f/u A1c

## 2022-03-12 NOTE — PROGRESS NOTE ADULT - PROBLEM SELECTOR PLAN 2
Patient with HILARY on possible CKD?. Creatinine on prior admission is 1.26-1.36, today 1.52.    - hold home losartan   - continue to monitor  - s/p 1L NS   - trend BUN/creatinine

## 2022-03-12 NOTE — PROGRESS NOTE ADULT - SUBJECTIVE AND OBJECTIVE BOX
INTERVAL HPI/OVERNIGHT EVENTS: No overnight events    Feels well. Has no acute complaints. ROS is otherwise negative    VITAL SIGNS:  T(F): 98.4 (03-12-22 @ 09:00)  HR: 63 (03-12-22 @ 09:00)  BP: 147/76 (03-12-22 @ 09:00)  RR: 18 (03-12-22 @ 09:00)  SpO2: 97% (03-12-22 @ 09:00)  Wt(kg): --    PHYSICAL EXAM:    Constitutional: NAD  Eyes: PERRLA  ENMT: MMM  Neck: supple  Respiratory: CTA b/l  Cardiovascular: rrr, s1s2, no m/r/g  Gastrointestinal: soft, NTND, + BS  Extremities: wwp  Vascular: + 2 pulses radial  Neurological: AAO x 4  Musculoskeletal: no joint swelling    MEDICATIONS  (STANDING):  amLODIPine   Tablet 10 milliGRAM(s) Oral every 24 hours  aspirin enteric coated 81 milliGRAM(s) Oral daily  atorvastatin 80 milliGRAM(s) Oral at bedtime  clopidogrel Tablet 75 milliGRAM(s) Oral daily  dextrose 40% Gel 15 Gram(s) Oral once  dextrose 5%. 1000 milliLiter(s) (50 mL/Hr) IV Continuous <Continuous>  dextrose 5%. 1000 milliLiter(s) (100 mL/Hr) IV Continuous <Continuous>  dextrose 50% Injectable 25 Gram(s) IV Push once  dextrose 50% Injectable 12.5 Gram(s) IV Push once  dextrose 50% Injectable 25 Gram(s) IV Push once  glucagon  Injectable 1 milliGRAM(s) IntraMuscular once  insulin glargine Injectable (LANTUS) 32 Unit(s) SubCutaneous at bedtime  insulin lispro (ADMELOG) corrective regimen sliding scale   SubCutaneous Before meals and at bedtime  metoprolol succinate ER 50 milliGRAM(s) Oral daily  piperacillin/tazobactam IVPB.. 3.375 Gram(s) IV Intermittent every 6 hours  vancomycin  IVPB 1000 milliGRAM(s) IV Intermittent every 24 hours    MEDICATIONS  (PRN):      Allergies    iodinated radiocontrast agents (Hives)    Intolerances        LABS:                        9.9    10.56 )-----------( 323      ( 12 Mar 2022 05:56 )             31.2     03-12    134<L>  |  106  |  25<H>  ----------------------------<  195<H>  4.5   |  21<L>  |  1.52<H>    Ca    8.0<L>      12 Mar 2022 05:56  Phos  3.6     03-12  Mg     2.1     03-12    TPro  6.9  /  Alb  3.2<L>  /  TBili  <0.2  /  DBili  x   /  AST  15  /  ALT  14  /  AlkPhos  115  03-11    PT/INR - ( 12 Mar 2022 10:26 )   PT: 12.3 sec;   INR: 1.03          PTT - ( 12 Mar 2022 10:26 )  PTT:30.4 sec      RADIOLOGY & ADDITIONAL TESTS:

## 2022-03-12 NOTE — PROGRESS NOTE ADULT - PROBLEM SELECTOR PLAN 7
Patient with PAD/PVD s/p angio/stent 4 days ago at [facility] and history of thrombectomy.     - c/w atorvastatin 80 mg q daily  - c/w aspirin 81 mg q daily  - c/w clopidogrel 75 mg q daily  - vascular consulted - no acute interventions

## 2022-03-13 ENCOUNTER — RESULT REVIEW (OUTPATIENT)
Age: 62
End: 2022-03-13

## 2022-03-13 LAB
ANION GAP SERPL CALC-SCNC: 10 MMOL/L — SIGNIFICANT CHANGE UP (ref 5–17)
APTT BLD: 29.9 SEC — SIGNIFICANT CHANGE UP (ref 27.5–35.5)
BUN SERPL-MCNC: 21 MG/DL — SIGNIFICANT CHANGE UP (ref 7–23)
CALCIUM SERPL-MCNC: 8.3 MG/DL — LOW (ref 8.4–10.5)
CHLORIDE SERPL-SCNC: 105 MMOL/L — SIGNIFICANT CHANGE UP (ref 96–108)
CO2 SERPL-SCNC: 21 MMOL/L — LOW (ref 22–31)
CREAT SERPL-MCNC: 1.55 MG/DL — HIGH (ref 0.5–1.3)
CRP SERPL-MCNC: 21.4 MG/L — HIGH (ref 0–4)
EGFR: 50 ML/MIN/1.73M2 — LOW
ERYTHROCYTE [SEDIMENTATION RATE] IN BLOOD: 100 MM/HR — HIGH
GLUCOSE BLDC GLUCOMTR-MCNC: 126 MG/DL — HIGH (ref 70–99)
GLUCOSE BLDC GLUCOMTR-MCNC: 132 MG/DL — HIGH (ref 70–99)
GLUCOSE BLDC GLUCOMTR-MCNC: 197 MG/DL — HIGH (ref 70–99)
GLUCOSE BLDC GLUCOMTR-MCNC: 246 MG/DL — HIGH (ref 70–99)
GLUCOSE SERPL-MCNC: 138 MG/DL — HIGH (ref 70–99)
GRAM STN FLD: SIGNIFICANT CHANGE UP
GRAM STN FLD: SIGNIFICANT CHANGE UP
HCT VFR BLD CALC: 32.8 % — LOW (ref 39–50)
HGB BLD-MCNC: 10.7 G/DL — LOW (ref 13–17)
INR BLD: 1.07 — SIGNIFICANT CHANGE UP (ref 0.88–1.16)
MAGNESIUM SERPL-MCNC: 1.9 MG/DL — SIGNIFICANT CHANGE UP (ref 1.6–2.6)
MCHC RBC-ENTMCNC: 29.1 PG — SIGNIFICANT CHANGE UP (ref 27–34)
MCHC RBC-ENTMCNC: 32.6 GM/DL — SIGNIFICANT CHANGE UP (ref 32–36)
MCV RBC AUTO: 89.1 FL — SIGNIFICANT CHANGE UP (ref 80–100)
NRBC # BLD: 0 /100 WBCS — SIGNIFICANT CHANGE UP (ref 0–0)
PHOSPHATE SERPL-MCNC: 3.8 MG/DL — SIGNIFICANT CHANGE UP (ref 2.5–4.5)
PLATELET # BLD AUTO: 352 K/UL — SIGNIFICANT CHANGE UP (ref 150–400)
POTASSIUM SERPL-MCNC: 4.7 MMOL/L — SIGNIFICANT CHANGE UP (ref 3.5–5.3)
POTASSIUM SERPL-SCNC: 4.7 MMOL/L — SIGNIFICANT CHANGE UP (ref 3.5–5.3)
PROTHROM AB SERPL-ACNC: 12.7 SEC — SIGNIFICANT CHANGE UP (ref 10.5–13.4)
RBC # BLD: 3.68 M/UL — LOW (ref 4.2–5.8)
RBC # FLD: 12.1 % — SIGNIFICANT CHANGE UP (ref 10.3–14.5)
SODIUM SERPL-SCNC: 136 MMOL/L — SIGNIFICANT CHANGE UP (ref 135–145)
SPECIMEN SOURCE: SIGNIFICANT CHANGE UP
SPECIMEN SOURCE: SIGNIFICANT CHANGE UP
WBC # BLD: 11.2 K/UL — HIGH (ref 3.8–10.5)
WBC # FLD AUTO: 11.2 K/UL — HIGH (ref 3.8–10.5)

## 2022-03-13 PROCEDURE — 88311 DECALCIFY TISSUE: CPT | Mod: 26

## 2022-03-13 PROCEDURE — 99233 SBSQ HOSP IP/OBS HIGH 50: CPT | Mod: GC

## 2022-03-13 PROCEDURE — 88305 TISSUE EXAM BY PATHOLOGIST: CPT | Mod: 26

## 2022-03-13 RX ORDER — HYDROMORPHONE HYDROCHLORIDE 2 MG/ML
0.25 INJECTION INTRAMUSCULAR; INTRAVENOUS; SUBCUTANEOUS ONCE
Refills: 0 | Status: DISCONTINUED | OUTPATIENT
Start: 2022-03-13 | End: 2022-03-13

## 2022-03-13 RX ORDER — OXYCODONE AND ACETAMINOPHEN 5; 325 MG/1; MG/1
1 TABLET ORAL ONCE
Refills: 0 | Status: DISCONTINUED | OUTPATIENT
Start: 2022-03-13 | End: 2022-03-13

## 2022-03-13 RX ADMIN — Medication 81 MILLIGRAM(S): at 12:50

## 2022-03-13 RX ADMIN — OXYCODONE AND ACETAMINOPHEN 1 TABLET(S): 5; 325 TABLET ORAL at 22:03

## 2022-03-13 RX ADMIN — Medication 0: at 17:51

## 2022-03-13 RX ADMIN — Medication 0: at 22:03

## 2022-03-13 RX ADMIN — PIPERACILLIN AND TAZOBACTAM 200 GRAM(S): 4; .5 INJECTION, POWDER, LYOPHILIZED, FOR SOLUTION INTRAVENOUS at 17:53

## 2022-03-13 RX ADMIN — ATORVASTATIN CALCIUM 80 MILLIGRAM(S): 80 TABLET, FILM COATED ORAL at 22:03

## 2022-03-13 RX ADMIN — PIPERACILLIN AND TAZOBACTAM 200 GRAM(S): 4; .5 INJECTION, POWDER, LYOPHILIZED, FOR SOLUTION INTRAVENOUS at 00:12

## 2022-03-13 RX ADMIN — PIPERACILLIN AND TAZOBACTAM 200 GRAM(S): 4; .5 INJECTION, POWDER, LYOPHILIZED, FOR SOLUTION INTRAVENOUS at 12:52

## 2022-03-13 RX ADMIN — OXYCODONE AND ACETAMINOPHEN 1 TABLET(S): 5; 325 TABLET ORAL at 22:51

## 2022-03-13 RX ADMIN — AMLODIPINE BESYLATE 10 MILLIGRAM(S): 2.5 TABLET ORAL at 16:59

## 2022-03-13 RX ADMIN — Medication 50 MILLIGRAM(S): at 06:11

## 2022-03-13 RX ADMIN — PIPERACILLIN AND TAZOBACTAM 200 GRAM(S): 4; .5 INJECTION, POWDER, LYOPHILIZED, FOR SOLUTION INTRAVENOUS at 06:11

## 2022-03-13 RX ADMIN — PIPERACILLIN AND TAZOBACTAM 200 GRAM(S): 4; .5 INJECTION, POWDER, LYOPHILIZED, FOR SOLUTION INTRAVENOUS at 23:21

## 2022-03-13 RX ADMIN — Medication 250 MILLIGRAM(S): at 17:00

## 2022-03-13 RX ADMIN — CLOPIDOGREL BISULFATE 75 MILLIGRAM(S): 75 TABLET, FILM COATED ORAL at 12:50

## 2022-03-13 RX ADMIN — INSULIN GLARGINE 32 UNIT(S): 100 INJECTION, SOLUTION SUBCUTANEOUS at 22:02

## 2022-03-13 NOTE — PROGRESS NOTE ADULT - PROBLEM SELECTOR PLAN 3
Patient hypertensive on home losartan 50 mg q daily and toprol 50 mg q daily      To do:  - c/w home toprol 50 mg q daily  - hold home losartan 50 mg q daily given HILARY   - c/w amlodipine 10 mg q daily

## 2022-03-13 NOTE — PROGRESS NOTE ADULT - SUBJECTIVE AND OBJECTIVE BOX
OVERNIGHT EVENTS:    SUBJECTIVE / INTERVAL HPI: Patient seen and examined at bedside.     VITAL SIGNS:  Vital Signs Last 24 Hrs  T(C): 36.9 (13 Mar 2022 05:45), Max: 36.9 (13 Mar 2022 05:45)  T(F): 98.4 (13 Mar 2022 05:45), Max: 98.4 (13 Mar 2022 05:45)  HR: 65 (13 Mar 2022 05:45) (60 - 72)  BP: 145/64 (13 Mar 2022 05:45) (145/64 - 175/68)  BP(mean): --  RR: 18 (13 Mar 2022 05:45) (18 - 20)  SpO2: 98% (13 Mar 2022 05:45) (98% - 98%)    PHYSICAL EXAM:    General: WDWN  HEENT: NC/AT; PERRL, anicteric sclera; MMM  Neck: supple  Cardiovascular: +S1/S2, RRR  Respiratory: CTA B/L; no W/R/R  Gastrointestinal: soft, NT/ND; +BSx4  Extremities: WWP; no edema, clubbing or cyanosis  Vascular: 2+ radial, DP/PT pulses B/L  Neurological: AAOx3; no focal deficits    MEDICATIONS:  MEDICATIONS  (STANDING):  amLODIPine   Tablet 10 milliGRAM(s) Oral every 24 hours  aspirin enteric coated 81 milliGRAM(s) Oral daily  atorvastatin 80 milliGRAM(s) Oral at bedtime  clopidogrel Tablet 75 milliGRAM(s) Oral daily  dextrose 40% Gel 15 Gram(s) Oral once  dextrose 5%. 1000 milliLiter(s) (50 mL/Hr) IV Continuous <Continuous>  dextrose 5%. 1000 milliLiter(s) (100 mL/Hr) IV Continuous <Continuous>  dextrose 50% Injectable 25 Gram(s) IV Push once  dextrose 50% Injectable 12.5 Gram(s) IV Push once  dextrose 50% Injectable 25 Gram(s) IV Push once  glucagon  Injectable 1 milliGRAM(s) IntraMuscular once  insulin glargine Injectable (LANTUS) 32 Unit(s) SubCutaneous at bedtime  insulin lispro (ADMELOG) corrective regimen sliding scale   SubCutaneous Before meals and at bedtime  metoprolol succinate ER 50 milliGRAM(s) Oral daily  piperacillin/tazobactam IVPB.. 3.375 Gram(s) IV Intermittent every 6 hours  vancomycin  IVPB 1000 milliGRAM(s) IV Intermittent every 24 hours    MEDICATIONS  (PRN):      ALLERGIES:  Allergies    iodinated radiocontrast agents (Hives)    Intolerances        LABS:                        10.7   11.20 )-----------( 352      ( 13 Mar 2022 08:07 )             32.8     03-13    136  |  105  |  21  ----------------------------<  138<H>  4.7   |  21<L>  |  1.55<H>    Ca    8.3<L>      13 Mar 2022 08:07  Phos  3.8     03-13  Mg     1.9     03-13    TPro  6.9  /  Alb  3.2<L>  /  TBili  <0.2  /  DBili  x   /  AST  15  /  ALT  14  /  AlkPhos  115  03-11    PT/INR - ( 13 Mar 2022 08:07 )   PT: 12.7 sec;   INR: 1.07          PTT - ( 13 Mar 2022 08:07 )  PTT:29.9 sec    CAPILLARY BLOOD GLUCOSE      POCT Blood Glucose.: 132 mg/dL (13 Mar 2022 08:17)      RADIOLOGY & ADDITIONAL TESTS: Reviewed. OVERNIGHT EVENTS: No acute events overnight     SUBJECTIVE / INTERVAL HPI: Patient seen and examined at bedside. He was sitting comfortably and did not appear in acute distress. He denied any headache, vision disturbance, N/V/C/D, chest pain, numbness or weakness.     VITAL SIGNS:  Vital Signs Last 24 Hrs  T(C): 36.9 (13 Mar 2022 05:45), Max: 36.9 (13 Mar 2022 05:45)  T(F): 98.4 (13 Mar 2022 05:45), Max: 98.4 (13 Mar 2022 05:45)  HR: 65 (13 Mar 2022 05:45) (60 - 72)  BP: 145/64 (13 Mar 2022 05:45) (145/64 - 175/68)  BP(mean): --  RR: 18 (13 Mar 2022 05:45) (18 - 20)  SpO2: 98% (13 Mar 2022 05:45) (98% - 98%)    PHYSICAL EXAM:    General: NAD, resting comfortably in bed  Pulmonary: no acute respiratory distress, no accessory muscle use, speaking in complete sentences  Cardiovascular: S1 S2 heard, no murmur  Extremities: R 1st toe dry gangrene at the tip and medially. Wet gangrene over pad of the great toe. Mild tenderness to palpation surrounding gangrenous portion. Mild erythema and edema throughout the toe and dorsum of the foot. Bilateral lower shin hyper-pigmentation.   Pulses: Palpable b/l  Vascular: 2+ radial, DP/PT pulses B/L  Neurological: AAOx3; no focal deficits    MEDICATIONS:  MEDICATIONS  (STANDING):  amLODIPine   Tablet 10 milliGRAM(s) Oral every 24 hours  aspirin enteric coated 81 milliGRAM(s) Oral daily  atorvastatin 80 milliGRAM(s) Oral at bedtime  clopidogrel Tablet 75 milliGRAM(s) Oral daily  dextrose 40% Gel 15 Gram(s) Oral once  dextrose 5%. 1000 milliLiter(s) (50 mL/Hr) IV Continuous <Continuous>  dextrose 5%. 1000 milliLiter(s) (100 mL/Hr) IV Continuous <Continuous>  dextrose 50% Injectable 25 Gram(s) IV Push once  dextrose 50% Injectable 12.5 Gram(s) IV Push once  dextrose 50% Injectable 25 Gram(s) IV Push once  glucagon  Injectable 1 milliGRAM(s) IntraMuscular once  insulin glargine Injectable (LANTUS) 32 Unit(s) SubCutaneous at bedtime  insulin lispro (ADMELOG) corrective regimen sliding scale   SubCutaneous Before meals and at bedtime  metoprolol succinate ER 50 milliGRAM(s) Oral daily  piperacillin/tazobactam IVPB.. 3.375 Gram(s) IV Intermittent every 6 hours  vancomycin  IVPB 1000 milliGRAM(s) IV Intermittent every 24 hours    MEDICATIONS  (PRN):      ALLERGIES:  Allergies    iodinated radiocontrast agents (Hives)    Intolerances        LABS:                        10.7   11.20 )-----------( 352      ( 13 Mar 2022 08:07 )             32.8     03-13    136  |  105  |  21  ----------------------------<  138<H>  4.7   |  21<L>  |  1.55<H>    Ca    8.3<L>      13 Mar 2022 08:07  Phos  3.8     03-13  Mg     1.9     03-13    TPro  6.9  /  Alb  3.2<L>  /  TBili  <0.2  /  DBili  x   /  AST  15  /  ALT  14  /  AlkPhos  115  03-11    PT/INR - ( 13 Mar 2022 08:07 )   PT: 12.7 sec;   INR: 1.07          PTT - ( 13 Mar 2022 08:07 )  PTT:29.9 sec    CAPILLARY BLOOD GLUCOSE      POCT Blood Glucose.: 132 mg/dL (13 Mar 2022 08:17)      RADIOLOGY & ADDITIONAL TESTS: Reviewed.

## 2022-03-13 NOTE — PROGRESS NOTE ADULT - ASSESSMENT
61 yo male with a hx of DM, PVD s/p angio/stent 4 days ago (at [facility]) p/w R big toe discoloration and swelling.  Podiatry consulted to evaluate for wet gangrene. Of note, pt is afebrile, VSS, with  WBC dowtrending. Clinical image of right hallux highly suspicious of wet gangrene.     Plan:  - Pt evaluated and chart reviewed  - Cont broad spectrum IV ABX  - Pt added on to OR schedule for RIGHT hallux amputation for 3/13    - Consent for procedure obtained and placed in pts chart   - Please medically optimize pt for OR  - NPO @ midnight   - Please obtain all pre-operative labs and ensure pt has active type and screen   - recc heel WB in surgical shoe  - Local wound care: betadine DSD  - rest of care per primary team    Podiatry following.

## 2022-03-13 NOTE — PROGRESS NOTE ADULT - PROBLEM SELECTOR PLAN 8
Patient with T2DM on metformin 1000 mg and Lantus 40 U at bedtime at home. Presenting with hyperglycemia likely in the setting of infection/inflammation vs missed Lantus/metformin doses over past few days.     To do:  - c/w Lantus 32 U at bedtime (80% of home dose)   - readjust dosing as needed based on mISS requirements tomorrow.   - mISS   - f/u A1c

## 2022-03-13 NOTE — PROGRESS NOTE ADULT - PROBLEM SELECTOR PLAN 4
Patient with extensive CAD s/p CAGB and multiple PCIs with RAJENDRA.       To do:  - c/w atorvastatin 80 mg q daily  - c/w aspirin 81 mg q daily  - c/w clopidogrel 75 mg q daily

## 2022-03-13 NOTE — PROGRESS NOTE ADULT - ASSESSMENT
Patient is a 63 yo male with a hx of HTN, DM (on insulin and metformin), PVD s/p angio/stent 4 days ago, known CAD s/p 3vCABG, multiple PCIs with RAJENDRA, PAD w h/o acute limb ischemia w/thrombectomy p/w R big toe discoloration and swelling, found to have possible wet gangrene, admitted for IV antibiotic management and s/p right hallux amputation at level of 1st MPJ partially closed and packed.

## 2022-03-13 NOTE — BRIEF OPERATIVE NOTE - OPERATION/FINDINGS
20cc 1%lidocaine plain and 0.5% Marcaine plain 1:1 mix in hernandez block fashion, Disarticulation of right hallux at 1st MPJ, no purulence noted. Deep culture swab taken, hallux sent to lab. Copiously irrigated with pulse lavage and normal saline. Clean margin taken from metatarsal head sent for culture and pathology. Partially closed and packed with iodoform packing, dressed with Xeroform, DSD, ACE.

## 2022-03-13 NOTE — PROGRESS NOTE ADULT - ATTENDING COMMENTS
Patient was seen and examined at bedside on 3/13/2022 at 930am. Agree with history as above. Denies CP, SOB. ROS is otherwise negative. Vitals, labwork and pertinent imaging reviewed. Physical exam - NAD, AAO x 4, PERRLA, EOMI, MMM, supple neck, chest - CTA b/l, CV - s1s2, no m/r/g, abd - soft, TTP in RLQ, + BS, ext - wwp , psych - calm affect, skin - ulcer    Plan  -Pt for OR with Podiatry  -If clean margins will likely be able to d/c on oral abx  -ID consult in AM if cultures + Patient was seen and examined at bedside on 3/13/2022 at 930am. Agree with history as above. Denies CP, SOB. ROS is otherwise negative. Vitals, labwork and pertinent imaging reviewed. Physical exam - NAD, AAO x 4, PERRLA, EOMI, MMM, supple neck, chest - CTA b/l, CV - s1s2, no m/r/g, abd - soft, TTP in RLQ, + BS, ext - wwp , psych - calm affect, skin - ulcer    Plan  -Pt for OR with Podiatry  -If clean margins will likely be able to d/c on oral abx  -Vascular/Podiatry to weigh in given complaints of numbness  -ID consult in AM if cultures +

## 2022-03-13 NOTE — PROGRESS NOTE ADULT - SUBJECTIVE AND OBJECTIVE BOX
Subjective: Patient examined at bedside s/p R great toe amputation with Podiatry today. Pain controlled, no complaints    ROS:   Denies Headache, blurred vision, Chest Pain, SOB, Abdominal pain, nausea or vomiting     Social   piperacillin/tazobactam IVPB.. 3.375  vancomycin  IVPB 1000  amLODIPine   Tablet 10  aspirin enteric coated 81  clopidogrel Tablet 75  metoprolol succinate ER 50  piperacillin/tazobactam IVPB.. 3.375  vancomycin  IVPB 1000      Allergies    iodinated radiocontrast agents (Hives)    Intolerances        Vital Signs Last 24 Hrs  T(C): 36.6 (13 Mar 2022 11:15), Max: 36.9 (13 Mar 2022 05:45)  T(F): 97.8 (13 Mar 2022 11:15), Max: 98.4 (13 Mar 2022 05:45)  HR: 50 (13 Mar 2022 12:45) (50 - 72)  BP: 173/78 (13 Mar 2022 12:45) (116/83 - 175/68)  BP(mean): 112 (13 Mar 2022 12:45) (94 - 112)  RR: 17 (13 Mar 2022 12:45) (13 - 22)  SpO2: 100% (13 Mar 2022 12:45) (98% - 100%)  I&O's Summary    12 Mar 2022 06:01  -  13 Mar 2022 07:00  --------------------------------------------------------  IN: 450 mL / OUT: 600 mL / NET: -150 mL        Physical Exam:  General: NAD, resting comfortably in bed  Pulmonary: no acute respiratory distress, no accessory muscle use, speaking in complete sentences  Cardiovascular: normal rate per chart review  Extremities: R 1st toe dry gangrene at the tip and medially. Wet gangrene over pad of the great toe. Mild tenderness to palpation surrounding gangrenous portion. Mild erythema and edema throughout the toe and dorsum of the foot. Bilateral lower shin hyper-pigmentation.   Pulses: Triphasic L PT, DP signals. Unable to assess R pulses due to surgical dressings.     LABS:                        10.7   11.20 )-----------( 352      ( 13 Mar 2022 08:07 )             32.8     03-13    136  |  105  |  21  ----------------------------<  138<H>  4.7   |  21<L>  |  1.55<H>    Ca    8.3<L>      13 Mar 2022 08:07  Phos  3.8     03-13  Mg     1.9     03-13    TPro  6.9  /  Alb  3.2<L>  /  TBili  <0.2  /  DBili  x   /  AST  15  /  ALT  14  /  AlkPhos  115  03-11    PT/INR - ( 13 Mar 2022 08:07 )   PT: 12.7 sec;   INR: 1.07          PTT - ( 13 Mar 2022 08:07 )  PTT:29.9 sec    Assessment/Plan:   62yM hx of HTN, DM (on insulin and metformin), PVD s/p angio/stent R leg 3/7/22, known CAD s/p 3vCABG, multiple PCIs with RAJENDRA, PAD w h/o acute limb ischemia w/thrombectomy p/w R big toe discoloration and swelling. Patient was here in 11/2021 for angina and was seen by podiatry. Diagnosed with dry eschar and given 1 week follow up. Patient states that he had stent placed at [facility] in R leg on 3/7/22 which he thought would improve toe, however had increasing "blackness" of toe from tip of toe down to base. Physical exam consistent with wet/dry gangrene. WBC improved today. Improving pain. Receiving IV vanc/zosyn as well as diabetes control.     Recommendations:  - Vascular duplex unnecessary at this time  - please obtain vascular records from outside hospital to determine where the stents were placed.   - please continue ASA /Plavix for the stents  - c/w IV abx as per podiatry   - Team 3c Vascular will continue to follow   Subjective: Patient examined at bedside s/p R great toe amputation with Podiatry today. Pain controlled, no complaints    ROS:   Denies Headache, blurred vision, Chest Pain, SOB, Abdominal pain, nausea or vomiting     Social   piperacillin/tazobactam IVPB.. 3.375  vancomycin  IVPB 1000  amLODIPine   Tablet 10  aspirin enteric coated 81  clopidogrel Tablet 75  metoprolol succinate ER 50  piperacillin/tazobactam IVPB.. 3.375  vancomycin  IVPB 1000      Allergies    iodinated radiocontrast agents (Hives)    Intolerances        Vital Signs Last 24 Hrs  T(C): 36.6 (13 Mar 2022 11:15), Max: 36.9 (13 Mar 2022 05:45)  T(F): 97.8 (13 Mar 2022 11:15), Max: 98.4 (13 Mar 2022 05:45)  HR: 50 (13 Mar 2022 12:45) (50 - 72)  BP: 173/78 (13 Mar 2022 12:45) (116/83 - 175/68)  BP(mean): 112 (13 Mar 2022 12:45) (94 - 112)  RR: 17 (13 Mar 2022 12:45) (13 - 22)  SpO2: 100% (13 Mar 2022 12:45) (98% - 100%)  I&O's Summary    12 Mar 2022 06:01  -  13 Mar 2022 07:00  --------------------------------------------------------  IN: 450 mL / OUT: 600 mL / NET: -150 mL        Physical Exam:  General: NAD, resting comfortably in bed  Pulmonary: no acute respiratory distress, no accessory muscle use, speaking in complete sentences  Cardiovascular: normal rate per chart review  Extremities: surgical dressing applied to R foot, no drainage or saturation noted on dressings.  Pulses: Triphasic L PT, DP signals. Unable to assess R pulses due to surgical dressings.     LABS:                        10.7   11.20 )-----------( 352      ( 13 Mar 2022 08:07 )             32.8     03-13    136  |  105  |  21  ----------------------------<  138<H>  4.7   |  21<L>  |  1.55<H>    Ca    8.3<L>      13 Mar 2022 08:07  Phos  3.8     03-13  Mg     1.9     03-13    TPro  6.9  /  Alb  3.2<L>  /  TBili  <0.2  /  DBili  x   /  AST  15  /  ALT  14  /  AlkPhos  115  03-11    PT/INR - ( 13 Mar 2022 08:07 )   PT: 12.7 sec;   INR: 1.07          PTT - ( 13 Mar 2022 08:07 )  PTT:29.9 sec    Assessment/Plan:   62yM hx of HTN, DM (on insulin and metformin), PVD s/p angio/stent R leg 3/7/22, known CAD s/p 3vCABG, multiple PCIs with RAJENDRA, PAD w h/o acute limb ischemia w/thrombectomy p/w R big toe discoloration and swelling. Patient was here in 11/2021 for angina and was seen by podiatry. Diagnosed with dry eschar and given 1 week follow up. Patient states that he had stent placed at [facility] in R leg on 3/7/22 which he thought would improve toe, however had increasing "blackness" of toe from tip of toe down to base. Physical exam consistent with wet/dry gangrene. WBC improved today. Improving pain. Receiving IV vanc/zosyn as well as diabetes control. Underwent R great toe amputation with podiatry on 3/13/22.      - Vascular duplex unnecessary at this time  - please obtain vascular records from outside hospital to determine where the stents were placed.   - please continue ASA /Plavix for the stents  - c/w IV abx as per podiatry   - Team 3c Vascular will continue to follow

## 2022-03-13 NOTE — PROGRESS NOTE ADULT - PROBLEM SELECTOR PLAN 6
Patient with normocytic anemia hgb of 11.7. Trending laterally since last admission. No sign of obvious bleed.       To do:  - f/u iron studies  - continue to monitor  - transfuse Hgb <8 given cardiac history  - maintain active T&S

## 2022-03-13 NOTE — PROGRESS NOTE ADULT - PROBLEM SELECTOR PLAN 9
F: per PO   E: replete to K>4, Mg>2, Phos>2.5  N: diabetic diet  Ppx: aspirin 81   Code Status: FULL  Dispo: [facility]

## 2022-03-13 NOTE — PROGRESS NOTE ADULT - PROBLEM SELECTOR PLAN 2
Patient with HILARY on possible CKD?. Creatinine on prior admission is 1.26-1.36, today 1.55.  - s/p 1L NS     To do:  - hold home losartan   - continue to monitor  - trend BUN/creatinine

## 2022-03-13 NOTE — PROGRESS NOTE ADULT - SUBJECTIVE AND OBJECTIVE BOX
POST OP NOTE    CHARITY DUENAS  MRN-[id number]    Procedure: Right Hallux amputation at level of MPJ, partially closed and packed  Surgeon: Dr. Tamez   Assistants: Tiffani Perez PGY-1    Patient tolerated procedure well without incident.  Patient transferred to PACU in stable condition.       PE / Post Op Check:       GEN: NAD, AAOx3, resting comfortably with pain controlled      LE Focused: CFT shows adequate perfusion b/l with no signs of ischemic compromise.  No strikethrough is appreciated on surgical dressings.  Dressings were dry, clean, and intact.  No neuromuscular deficits appreciated.

## 2022-03-13 NOTE — PROGRESS NOTE ADULT - SUBJECTIVE AND OBJECTIVE BOX
PRE-OP NOTE    Pre-Op Diagnosis: Right Hallux gangrene  Planned Procedure: Right hallux amputation   Scheduled Date / Time: 3/13 add on   Indication: Right hallux wet gangrene   Labs:                       10.7   11.20 )-----------( 352      ( 13 Mar 2022 08:07 )             32.8   03-13    136  |  105  |  21  ----------------------------<  138<H>  4.7   |  21<L>  |  1.55<H>    Ca    8.3<L>      13 Mar 2022 08:07  Phos  3.8     03-13  Mg     1.9     03-13    TPro  6.9  /  Alb  3.2<L>  /  TBili  <0.2  /  DBili  x   /  AST  15  /  ALT  14  /  AlkPhos  115  03-11  LIVER FUNCTIONS - ( 11 Mar 2022 16:26 )  Alb: 3.2 g/dL / Pro: 6.9 g/dL / ALK PHOS: 115 U/L / ALT: 14 U/L / AST: 15 U/L / GGT: x           Vitals: Vital Signs Last 24 Hrs  T(C): 36.9 (13 Mar 2022 05:45), Max: 36.9 (13 Mar 2022 05:45)  T(F): 98.4 (13 Mar 2022 05:45), Max: 98.4 (13 Mar 2022 05:45)  HR: 65 (13 Mar 2022 05:45) (60 - 72)  BP: 145/64 (13 Mar 2022 05:45) (145/64 - 175/68)  BP(mean): --  RR: 18 (13 Mar 2022 05:45) (18 - 20)  SpO2: 98% (13 Mar 2022 05:45) (98% - 98%)  PE:   Official CXR reading: (On Chart)  Official EKG reading: (On Chart)  Type and Cross/Screen: x2  NPO after MN  Antibiotics: Vanc/Zosyn  Operative Consent (on chart)

## 2022-03-13 NOTE — PROGRESS NOTE ADULT - PROBLEM SELECTOR PLAN 7
Patient with PAD/PVD s/p angio/stent 4 days ago at [facility] and history of thrombectomy.   Patient states that he had stent placed at [facility] in R leg on 3/7/22 which he thought would improve toe, however had increasing "blackness" of toe from tip of toe down to base  - vascular consulted - no acute interventions    To do:  - c/w atorvastatin 80 mg q daily  - c/w aspirin 81 mg q daily  - c/w clopidogrel 75 mg q daily

## 2022-03-14 ENCOUNTER — TRANSCRIPTION ENCOUNTER (OUTPATIENT)
Age: 62
End: 2022-03-14

## 2022-03-14 DIAGNOSIS — E11.628 TYPE 2 DIABETES MELLITUS WITH OTHER SKIN COMPLICATIONS: ICD-10-CM

## 2022-03-14 LAB
ANION GAP SERPL CALC-SCNC: 10 MMOL/L — SIGNIFICANT CHANGE UP (ref 5–17)
APPEARANCE UR: CLEAR — SIGNIFICANT CHANGE UP
BACTERIA # UR AUTO: SIGNIFICANT CHANGE UP /HPF
BASOPHILS # BLD AUTO: 0.06 K/UL — SIGNIFICANT CHANGE UP (ref 0–0.2)
BASOPHILS NFR BLD AUTO: 0.5 % — SIGNIFICANT CHANGE UP (ref 0–2)
BILIRUB UR-MCNC: NEGATIVE — SIGNIFICANT CHANGE UP
BUN SERPL-MCNC: 28 MG/DL — HIGH (ref 7–23)
CALCIUM SERPL-MCNC: 8.9 MG/DL — SIGNIFICANT CHANGE UP (ref 8.4–10.5)
CHLORIDE SERPL-SCNC: 105 MMOL/L — SIGNIFICANT CHANGE UP (ref 96–108)
CO2 SERPL-SCNC: 22 MMOL/L — SIGNIFICANT CHANGE UP (ref 22–31)
COLOR SPEC: YELLOW — SIGNIFICANT CHANGE UP
CREAT ?TM UR-MCNC: 90 MG/DL — SIGNIFICANT CHANGE UP
CREAT SERPL-MCNC: 1.68 MG/DL — HIGH (ref 0.5–1.3)
DIFF PNL FLD: ABNORMAL
EGFR: 45 ML/MIN/1.73M2 — LOW
EOSINOPHIL # BLD AUTO: 0.27 K/UL — SIGNIFICANT CHANGE UP (ref 0–0.5)
EOSINOPHIL NFR BLD AUTO: 2.3 % — SIGNIFICANT CHANGE UP (ref 0–6)
EPI CELLS # UR: SIGNIFICANT CHANGE UP /HPF (ref 0–5)
FERRITIN SERPL-MCNC: 308 NG/ML — SIGNIFICANT CHANGE UP (ref 30–400)
GLUCOSE BLDC GLUCOMTR-MCNC: 102 MG/DL — HIGH (ref 70–99)
GLUCOSE BLDC GLUCOMTR-MCNC: 130 MG/DL — HIGH (ref 70–99)
GLUCOSE BLDC GLUCOMTR-MCNC: 162 MG/DL — HIGH (ref 70–99)
GLUCOSE BLDC GLUCOMTR-MCNC: 165 MG/DL — HIGH (ref 70–99)
GLUCOSE SERPL-MCNC: 123 MG/DL — HIGH (ref 70–99)
GLUCOSE UR QL: NEGATIVE — SIGNIFICANT CHANGE UP
HCT VFR BLD CALC: 33.7 % — LOW (ref 39–50)
HGB BLD-MCNC: 10.7 G/DL — LOW (ref 13–17)
HYALINE CASTS # UR AUTO: ABNORMAL /LPF (ref 0–2)
IMM GRANULOCYTES NFR BLD AUTO: 0.4 % — SIGNIFICANT CHANGE UP (ref 0–1.5)
IRON SATN MFR SERPL: 21 % — SIGNIFICANT CHANGE UP (ref 16–55)
IRON SATN MFR SERPL: 40 UG/DL — LOW (ref 45–165)
KETONES UR-MCNC: NEGATIVE — SIGNIFICANT CHANGE UP
LEUKOCYTE ESTERASE UR-ACNC: NEGATIVE — SIGNIFICANT CHANGE UP
LYMPHOCYTES # BLD AUTO: 1.29 K/UL — SIGNIFICANT CHANGE UP (ref 1–3.3)
LYMPHOCYTES # BLD AUTO: 11.1 % — LOW (ref 13–44)
MAGNESIUM SERPL-MCNC: 1.9 MG/DL — SIGNIFICANT CHANGE UP (ref 1.6–2.6)
MCHC RBC-ENTMCNC: 28.6 PG — SIGNIFICANT CHANGE UP (ref 27–34)
MCHC RBC-ENTMCNC: 31.8 GM/DL — LOW (ref 32–36)
MCV RBC AUTO: 90.1 FL — SIGNIFICANT CHANGE UP (ref 80–100)
MONOCYTES # BLD AUTO: 1.44 K/UL — HIGH (ref 0–0.9)
MONOCYTES NFR BLD AUTO: 12.4 % — SIGNIFICANT CHANGE UP (ref 2–14)
NEUTROPHILS # BLD AUTO: 8.47 K/UL — HIGH (ref 1.8–7.4)
NEUTROPHILS NFR BLD AUTO: 73.3 % — SIGNIFICANT CHANGE UP (ref 43–77)
NIGHT BLUE STAIN TISS: SIGNIFICANT CHANGE UP
NIGHT BLUE STAIN TISS: SIGNIFICANT CHANGE UP
NITRITE UR-MCNC: NEGATIVE — SIGNIFICANT CHANGE UP
NRBC # BLD: 0 /100 WBCS — SIGNIFICANT CHANGE UP (ref 0–0)
PH UR: 6 — SIGNIFICANT CHANGE UP (ref 5–8)
PHOSPHATE SERPL-MCNC: 3.7 MG/DL — SIGNIFICANT CHANGE UP (ref 2.5–4.5)
PLATELET # BLD AUTO: 359 K/UL — SIGNIFICANT CHANGE UP (ref 150–400)
POTASSIUM SERPL-MCNC: 5.1 MMOL/L — SIGNIFICANT CHANGE UP (ref 3.5–5.3)
POTASSIUM SERPL-SCNC: 5.1 MMOL/L — SIGNIFICANT CHANGE UP (ref 3.5–5.3)
PROT UR-MCNC: >=300 MG/DL
RBC # BLD: 3.74 M/UL — LOW (ref 4.2–5.8)
RBC # FLD: 12.2 % — SIGNIFICANT CHANGE UP (ref 10.3–14.5)
RBC CASTS # UR COMP ASSIST: < 5 /HPF — SIGNIFICANT CHANGE UP
SODIUM SERPL-SCNC: 137 MMOL/L — SIGNIFICANT CHANGE UP (ref 135–145)
SODIUM UR-SCNC: 100 MMOL/L — SIGNIFICANT CHANGE UP
SP GR SPEC: >=1.03 — SIGNIFICANT CHANGE UP (ref 1–1.03)
SPECIMEN SOURCE: SIGNIFICANT CHANGE UP
SPECIMEN SOURCE: SIGNIFICANT CHANGE UP
TIBC SERPL-MCNC: 195 UG/DL — LOW (ref 220–430)
TRANSFERRIN SERPL-MCNC: 158 MG/DL — LOW (ref 200–360)
UIBC SERPL-MCNC: 155 UG/DL — SIGNIFICANT CHANGE UP (ref 110–370)
UROBILINOGEN FLD QL: 1 E.U./DL — SIGNIFICANT CHANGE UP
VANCOMYCIN TROUGH SERPL-MCNC: 8.6 UG/ML — LOW (ref 10–20)
WBC # BLD: 11.58 K/UL — HIGH (ref 3.8–10.5)
WBC # FLD AUTO: 11.58 K/UL — HIGH (ref 3.8–10.5)
WBC UR QL: < 5 /HPF — SIGNIFICANT CHANGE UP

## 2022-03-14 PROCEDURE — 99233 SBSQ HOSP IP/OBS HIGH 50: CPT | Mod: GC

## 2022-03-14 PROCEDURE — 99254 IP/OBS CNSLTJ NEW/EST MOD 60: CPT | Mod: GC

## 2022-03-14 RX ORDER — ACETAMINOPHEN 500 MG
650 TABLET ORAL EVERY 6 HOURS
Refills: 0 | Status: DISCONTINUED | OUTPATIENT
Start: 2022-03-14 | End: 2022-03-16

## 2022-03-14 RX ORDER — POLYETHYLENE GLYCOL 3350 17 G/17G
17 POWDER, FOR SOLUTION ORAL DAILY
Refills: 0 | Status: DISCONTINUED | OUTPATIENT
Start: 2022-03-14 | End: 2022-03-16

## 2022-03-14 RX ORDER — HEPARIN SODIUM 5000 [USP'U]/ML
5000 INJECTION INTRAVENOUS; SUBCUTANEOUS EVERY 8 HOURS
Refills: 0 | Status: DISCONTINUED | OUTPATIENT
Start: 2022-03-14 | End: 2022-03-16

## 2022-03-14 RX ORDER — PIPERACILLIN AND TAZOBACTAM 4; .5 G/20ML; G/20ML
3.38 INJECTION, POWDER, LYOPHILIZED, FOR SOLUTION INTRAVENOUS EVERY 6 HOURS
Refills: 0 | Status: DISCONTINUED | OUTPATIENT
Start: 2022-03-14 | End: 2022-03-14

## 2022-03-14 RX ORDER — CEFTRIAXONE 500 MG/1
2000 INJECTION, POWDER, FOR SOLUTION INTRAMUSCULAR; INTRAVENOUS EVERY 24 HOURS
Refills: 0 | Status: DISCONTINUED | OUTPATIENT
Start: 2022-03-14 | End: 2022-03-15

## 2022-03-14 RX ORDER — SENNA PLUS 8.6 MG/1
2 TABLET ORAL AT BEDTIME
Refills: 0 | Status: DISCONTINUED | OUTPATIENT
Start: 2022-03-14 | End: 2022-03-16

## 2022-03-14 RX ORDER — PIPERACILLIN AND TAZOBACTAM 4; .5 G/20ML; G/20ML
2.25 INJECTION, POWDER, LYOPHILIZED, FOR SOLUTION INTRAVENOUS EVERY 6 HOURS
Refills: 0 | Status: DISCONTINUED | OUTPATIENT
Start: 2022-03-14 | End: 2022-03-14

## 2022-03-14 RX ORDER — TRAMADOL HYDROCHLORIDE 50 MG/1
25 TABLET ORAL EVERY 6 HOURS
Refills: 0 | Status: DISCONTINUED | OUTPATIENT
Start: 2022-03-14 | End: 2022-03-16

## 2022-03-14 RX ORDER — HYDRALAZINE HCL 50 MG
25 TABLET ORAL EVERY 8 HOURS
Refills: 0 | Status: DISCONTINUED | OUTPATIENT
Start: 2022-03-14 | End: 2022-03-16

## 2022-03-14 RX ORDER — GABAPENTIN 400 MG/1
300 CAPSULE ORAL EVERY 8 HOURS
Refills: 0 | Status: DISCONTINUED | OUTPATIENT
Start: 2022-03-14 | End: 2022-03-16

## 2022-03-14 RX ADMIN — ATORVASTATIN CALCIUM 80 MILLIGRAM(S): 80 TABLET, FILM COATED ORAL at 22:14

## 2022-03-14 RX ADMIN — AMLODIPINE BESYLATE 10 MILLIGRAM(S): 2.5 TABLET ORAL at 17:08

## 2022-03-14 RX ADMIN — Medication 81 MILLIGRAM(S): at 12:39

## 2022-03-14 RX ADMIN — CLOPIDOGREL BISULFATE 75 MILLIGRAM(S): 75 TABLET, FILM COATED ORAL at 12:39

## 2022-03-14 RX ADMIN — INSULIN GLARGINE 32 UNIT(S): 100 INJECTION, SOLUTION SUBCUTANEOUS at 22:14

## 2022-03-14 RX ADMIN — HEPARIN SODIUM 5000 UNIT(S): 5000 INJECTION INTRAVENOUS; SUBCUTANEOUS at 22:13

## 2022-03-14 RX ADMIN — CEFTRIAXONE 100 MILLIGRAM(S): 500 INJECTION, POWDER, FOR SOLUTION INTRAMUSCULAR; INTRAVENOUS at 18:45

## 2022-03-14 RX ADMIN — Medication 50 MILLIGRAM(S): at 05:58

## 2022-03-14 RX ADMIN — GABAPENTIN 300 MILLIGRAM(S): 400 CAPSULE ORAL at 17:08

## 2022-03-14 RX ADMIN — PIPERACILLIN AND TAZOBACTAM 200 GRAM(S): 4; .5 INJECTION, POWDER, LYOPHILIZED, FOR SOLUTION INTRAVENOUS at 05:58

## 2022-03-14 RX ADMIN — SENNA PLUS 2 TABLET(S): 8.6 TABLET ORAL at 22:14

## 2022-03-14 RX ADMIN — GABAPENTIN 300 MILLIGRAM(S): 400 CAPSULE ORAL at 09:00

## 2022-03-14 RX ADMIN — POLYETHYLENE GLYCOL 3350 17 GRAM(S): 17 POWDER, FOR SOLUTION ORAL at 12:39

## 2022-03-14 RX ADMIN — Medication 25 MILLIGRAM(S): at 18:44

## 2022-03-14 RX ADMIN — HEPARIN SODIUM 5000 UNIT(S): 5000 INJECTION INTRAVENOUS; SUBCUTANEOUS at 12:39

## 2022-03-14 RX ADMIN — PIPERACILLIN AND TAZOBACTAM 200 GRAM(S): 4; .5 INJECTION, POWDER, LYOPHILIZED, FOR SOLUTION INTRAVENOUS at 12:39

## 2022-03-14 NOTE — PROGRESS NOTE ADULT - PROBLEM SELECTOR PLAN 6
Patient with normocytic anemia hgb of 11.7-->10.7. Trending laterally since last admission. No sign of obvious bleed.     To do:  - continue to monitor  - transfuse Hgb <8 given cardiac history  - maintain active T&S Patient with h/o HLD     - c/w atorvastatin 80 mg q daily

## 2022-03-14 NOTE — PROGRESS NOTE ADULT - PROBLEM SELECTOR PLAN 8
Patient with T2DM on metformin 1000 mg and Lantus 40 U at bedtime at home. Presenting with hyperglycemia likely in the setting of infection/inflammation vs missed Lantus/metformin doses over past few days.     To do:  - c/w Lantus 32 U at bedtime (80% of home dose)   - readjust dosing as needed based on mISS requirements tomorrow.   - mISS   - f/u A1c Patient with PAD/PVD s/p angio/stent 4 days ago at [facility] and history of thrombectomy.   Patient states that he had stent placed at [facility] in R leg on 3/7/22 which he thought would improve toe, however had increasing "blackness" of toe from tip of toe down to base  - vascular consulted - no acute interventions    To do:  - c/w atorvastatin 80 mg q daily  - c/w aspirin 81 mg q daily  - c/w clopidogrel 75 mg q daily

## 2022-03-14 NOTE — PROGRESS NOTE ADULT - PROBLEM SELECTOR PLAN 4
Patient with extensive CAD s/p CAGB and multiple PCIs with RAJENDRA.       To do:  - c/w atorvastatin 80 mg q daily  - c/w aspirin 81 mg q daily  - c/w clopidogrel 75 mg q daily Patient hypertensive on home losartan 50 mg q daily and toprol 50 mg q daily      To do:  - cw hydral 25mg TID  - c/w home toprol 50 mg q daily  - hold home losartan 50 mg q daily given HILARY   - c/w amlodipine 10 mg q daily

## 2022-03-14 NOTE — CONSULT NOTE ADULT - ASSESSMENT
per Internal Medicine    63 yo male with a hx of HTN, DM (on insulin and metformin), PVD s/p angio/stent 4 days ago, known CAD s/p 3vCABG, multiple PCIs with RAJENDRA, PAD w h/o acute limb ischemia w/thrombectomy p/w R big toe discoloration and swelling, found to have possible wet gangrene, admitted for IV antibiotic management and s/p right hallux amputation at level of 1st MPJ partially closed and packed.      Problem/Plan - 1:  ·  Problem: Wet gangrene.   ·  Plan: Patient presenting with worsening discoloration and swelling of toe. Patient with known discoloration of toe since 11/2021. PMH of diabetes and PVD. XR foot wet read negative for soft tissue emphysema or radiographic signs of OM.  Patient states that he had stent placed at [facility] in [place] on 3/7/22 which he thought would improve toe, however had increasing "blackness" of toe from tip of toe down to base   S/P right hallux amputation at level of 1st MPJ partially closed and packed.    To do:  - F/U OR deep wound culture, dirty margin and clean margin   - c/w zosyn and vancomycin IV q daily  - f/u Podiatry recs.    Problem/Plan - 2:  ·  Problem: ATN (acute tubular necrosis).   ·  Plan: Patient with HILARY on possible CKD?. Creatinine on prior admission is 1.26-1.36, today 1.55.  - s/p 1L NS     To do:  - hold home losartan   - continue to monitor  - trend BUN/creatinine.    Problem/Plan - 3:  ·  Problem: HTN (hypertension).   ·  Plan: Patient hypertensive on home losartan 50 mg q daily and toprol 50 mg q daily      To do:  - c/w home toprol 50 mg q daily  - hold home losartan 50 mg q daily given HIALRY   - c/w amlodipine 10 mg q daily.    Problem/Plan - 4:  ·  Problem: CAD (coronary artery disease).   ·  Plan: Patient with extensive CAD s/p CAGB and multiple PCIs with RAJENDRA.       To do:  - c/w atorvastatin 80 mg q daily  - c/w aspirin 81 mg q daily  - c/w clopidogrel 75 mg q daily.    Problem/Plan - 5:  ·  Problem: Hyperlipidemia.   ·  Plan: Patient with h/o HLD     - c/w atorvastatin 80 mg q daily.    Problem/Plan - 6:  ·  Problem: Anemia.   ·  Plan: Patient with normocytic anemia hgb of 11.7. Trending laterally since last admission. No sign of obvious bleed.       To do:  - f/u iron studies  - continue to monitor  - transfuse Hgb <8 given cardiac history  - maintain active T&S.    Problem/Plan - 7:  ·  Problem: Peripheral artery disease.   ·  Plan: Patient with PAD/PVD s/p angio/stent 4 days ago at [facility] and history of thrombectomy.   Patient states that he had stent placed at [facility] in [place] on 3/7/22 which he thought would improve toe, however had increasing "blackness" of toe from tip of toe down to base  - vascular consulted - no acute interventions    To do:  - c/w atorvastatin 80 mg q daily  - c/w aspirin 81 mg q daily  - c/w clopidogrel 75 mg q daily.    Problem/Plan - 8:  ·  Problem: Type 2 diabetes mellitus with hyperglycemia.   ·  Plan: Patient with T2DM on metformin 1000 mg and Lantus 40 U at bedtime at home. Presenting with hyperglycemia likely in the setting of infection/inflammation vs missed Lantus/metformin doses over past few days.     To do:  - c/w Lantus 32 U at bedtime (80% of home dose)   - readjust dosing as needed based on mISS requirements tomorrow.   - mISS   - f/u A1c.    Problem/Plan - 9:  ·  Problem: Nutrition, metabolism, and development symptoms.   ·  Plan: F: per PO   E: replete to K>4, Mg>2, Phos>2.5  N: diabetic diet  Ppx: aspirin 81   Code Status: FULL  Dispo: RMF.  
63 yo male with a hx of DM, PVD s/p angio/stent 4 days ago (at [facility]) p/w R big toe discoloration and swelling.  Podiatry consulted to evaluate for wet gangrene. Of note, pt is afebrile, VSS, with elevated WBC of 14 and lactate wnl at 1.1. Clinical image of right hallux highly suspicious of wet gangrene.     Plan:  - Pt evaluated and chart reviewed  - recc broad spectrum IV ABX  - recc admit to medicine for gangrene work up  - plan for OR, debridement of gangrenous tissue possible hallux amputation  - recc heel WB in surgical shoe  - Local wound care: betadine DSD  - rest of care per primary team    Podiatry following. Plan d/w attending. 
IMPRESSION:  Osteomyelitis of the right first toe s/p amputation.  Cultures with Group B strep    Recommend:  1.  Can stop Vancomycin and Zosyn  2.  Start Ceftriaxone 2 grams IV daily  3.  Follow up wound cultures  4.  Follow up susceptibilities    ID team 1 will follow

## 2022-03-14 NOTE — DISCHARGE NOTE PROVIDER - HOSPITAL COURSE
#Discharge: do not delete    Patient is __ yo M/F with past medical history of _____ presented with _____, found to have _____ (one liner)    Hospital course (by problem):     Patient was discharged to: (home/[facility]/acute rehab/hospice, etc, and with what services – home health PT/RN? Home O2?)    New medications:   Changes to old medications:  Medications that were stopped:    Items to follow up as outpatient:    Physical exam at the time of discharge:       #Discharge: do not delete    Patient is a 61 yo male with a hx of HTN, DM (on insulin and metformin), PVD s/p angio/stent 4 days ago, known CAD s/p 3vCABG, multiple PCIs with RAJENDRA, PAD w h/o acute limb ischemia w/thrombectomy p/w R big toe discoloration and swelling, found to have possible wet gangrene, admitted for IV antibiotic management and s/p right hallux amputation at level of 1st MPJ partially closed and packed.    Hospital course (by problem):   1. Type 2 diabetes mellitus with diabetic foot infection and wet gangrene   -  X ray toes right foot: Soft tissue swelling surrounding the great toe.  - Blood culture negative   -  S/P right hallux amputation at level of 1st MPJ partially closed and packed.  - c/w ceftriaxone IV for Strep Agalactiae     Patient was discharged to: (home/[facility]/acute rehab/hospice, etc, and with what services – home health PT/RN? Home O2?)    New medications:   Changes to old medications:  Medications that were stopped:    Items to follow up as outpatient:    Physical exam at the time of discharge:       #Discharge: do not delete    Patient is a 63 yo male with a hx of HTN, DM (on insulin and metformin), PVD s/p angio/stent 4 days ago, known CAD s/p 3vCABG, multiple PCIs with RAJENDRA, PAD w h/o acute limb ischemia w/thrombectomy p/w R big toe discoloration and swelling, found to have possible wet gangrene, admitted for IV antibiotic management and s/p right hallux amputation at level of 1st MPJ partially closed and packed.    Hospital course (by problem):   1. Type 2 diabetes mellitus with diabetic foot infection and wet gangrene   - X ray toes right foot: Soft tissue swelling surrounding the great toe.  - Blood culture negative   - S/P right hallux amputation at level of 1st MPJ partially closed and packed.  - c/w ceftriaxone IV for Strep Agalactiae for 6 weeks until 25th April     2. ATN (acute tubular necrosis).  -Creatinine on prior admission is 1.26-1.36  - 03/15: Cr 1.55.  - continue holding losartan and f/u outpatient PCP for re evaluation of BMP and re starting losartan     3. HTN   - cw hydral 25mg TID  - c/w home toprol 50 mg q daily  - hold home losartan 50 mg q daily given HILARY   - c/w amlodipine 10 mg q daily.    4.  CAD (coronary artery disease), PAD and hyperlipidemia   - c/w atorvastatin 80 mg q daily  - c/w aspirin 81 mg q daily  - c/w clopidogrel 75 mg q daily.    5. Type 2 diabetes mellitus with hyperglycemia.  - cw metformin 1000 mg and Lantus 40 U at bedtime at home    Patient was discharged to: home    New medications: ceftriaxone IV for Strep Agalactiae for 6 weeks until 25th April, hydral 25mg TID  Changes to old medications: none  Medications that were stopped: hold home losartan 50 mg    Items to follow up as outpatient: BMP    Physical exam at the time of discharge:  General: WDWN  HEENT: NC/AT; PERRL, anicteric sclera; MMM  Neck: supple  Cardiovascular: +S1/S2, RRR  Respiratory: CTA B/L; no W/R/R  Gastrointestinal: soft, NT/ND; +BSx4  Extremities: WWP; no edema, clubbing or cyanosis, rt covered with dressing without any sign of bleeding   Vascular: 2+ radial, DP/PT pulses B/L  Neurological: AAOx3; no focal deficits     #Discharge: do not delete    Patient is a 63 yo male with a hx of HTN, DM (on insulin and metformin), PVD s/p angio/stent 4 days ago, known CAD s/p 3vCABG, multiple PCIs with RAJENDRA, PAD w h/o acute limb ischemia w/thrombectomy p/w R big toe discoloration and swelling, found to have possible wet gangrene, admitted for IV antibiotic management and s/p right hallux amputation at level of 1st MPJ partially closed and packed.    Hospital course (by problem):   1. Type 2 diabetes mellitus with diabetic foot infection and wet gangrene   - X ray toes right foot: Soft tissue swelling surrounding the great toe.  - Blood culture negative   - S/P right hallux amputation at level of 1st MPJ partially closed and packed.  - c/w ceftriaxone IV for Strep Agalactiae for 6 weeks until 24th April     2. ATN (acute tubular necrosis).  -Creatinine on prior admission is 1.26-1.36  - 03/15: Cr 1.55.  - continue holding losartan and f/u outpatient PCP for re evaluation of BMP and re starting losartan     3. HTN   - cw hydral 25mg TID  - c/w home toprol 50 mg q daily  - hold home losartan 50 mg q daily given HILARY   - c/w amlodipine 10 mg q daily.    4.  CAD (coronary artery disease), PAD and hyperlipidemia   - c/w atorvastatin 80 mg q daily  - c/w aspirin 81 mg q daily  - c/w clopidogrel 75 mg q daily.    5. Type 2 diabetes mellitus with hyperglycemia.  - cw metformin 1000 mg and Lantus 40 U at bedtime at home    Patient was discharged to: home    New medications: ceftriaxone IV for Strep Agalactiae for 6 weeks until 25th April, hydral 25mg TID  Changes to old medications: none  Medications that were stopped: hold home losartan 50 mg    Items to follow up as outpatient: BMP    Physical exam at the time of discharge:  General: WDWN  HEENT: NC/AT; PERRL, anicteric sclera; MMM  Neck: supple  Cardiovascular: +S1/S2, RRR  Respiratory: CTA B/L; no W/R/R  Gastrointestinal: soft, NT/ND; +BSx4  Extremities: WWP; no edema, clubbing or cyanosis, rt covered with dressing without any sign of bleeding   Vascular: 2+ radial, DP/PT pulses B/L  Neurological: AAOx3; no focal deficits     #Discharge: do not delete    Patient is a 63 yo male with a hx of HTN, DM (on insulin and metformin), PVD s/p angio/stent 4 days ago, known CAD s/p 3vCABG, multiple PCIs with RAJENDRA, PAD w h/o acute limb ischemia w/thrombectomy p/w R big toe discoloration and swelling, found to have possible wet gangrene, admitted for IV antibiotic management and s/p right hallux amputation at level of 1st MPJ partially closed and packed.    Hospital course (by problem):   1. Type 2 diabetes mellitus with diabetic foot infection and wet gangrene   - X ray toes right foot: Soft tissue swelling surrounding the great toe.  - Blood culture negative   - S/P right hallux amputation at level of 1st MPJ partially closed and packed.  - c/w ceftriaxone IV for Strep Agalactiae for 6 weeks until 24th April     2. ATN (acute tubular necrosis).  -Creatinine on prior admission is 1.26-1.36  - 03/15: Cr 1.67.  - continue holding losartan and f/u outpatient PCP for re evaluation of BMP and re starting losartan     3. HTN   - cw hydral 25mg TID  - c/w home toprol 50 mg q daily  - hold home losartan 50 mg q daily given HILARY   - c/w amlodipine 10 mg q daily.    4.  CAD (coronary artery disease), PAD and hyperlipidemia   - c/w atorvastatin 80 mg q daily  - c/w aspirin 81 mg q daily  - c/w clopidogrel 75 mg q daily.    5. Type 2 diabetes mellitus with hyperglycemia.  - cw metformin 1000 mg and Lantus 40 U at bedtime at home    Patient was discharged to: home    New medications: ceftriaxone IV for Strep Agalactiae for 6 weeks until 25th April, hydral 25mg TID  Changes to old medications: none  Medications that were stopped: hold home losartan 50 mg    Items to follow up as outpatient: BMP    Physical exam at the time of discharge:  General: WDWN  HEENT: NC/AT; PERRL, anicteric sclera; MMM  Neck: supple  Cardiovascular: +S1/S2, RRR  Respiratory: CTA B/L; no W/R/R  Gastrointestinal: soft, NT/ND; +BSx4  Extremities: WWP; no edema, clubbing or cyanosis, rt covered with dressing without any sign of bleeding   Vascular: 2+ radial, DP/PT pulses B/L  Neurological: AAOx3; no focal deficits     #Discharge: do not delete    Patient is a 61 yo male with a hx of HTN, DM (on insulin and metformin), PVD s/p angio/stent 4 days ago, known CAD s/p 3vCABG, multiple PCIs with RAJENDRA, PAD w h/o acute limb ischemia w/thrombectomy p/w R big toe discoloration and swelling, found to have possible wet gangrene, admitted for IV antibiotic management and s/p right hallux amputation at level of 1st MPJ partially closed and packed.    Hospital course (by problem):   1. Type 2 diabetes mellitus with diabetic foot infection and wet gangrene   - X ray toes right foot: Soft tissue swelling surrounding the great toe.  - Blood culture negative   - S/P right hallux amputation at level of 1st MPJ partially closed and packed.  - c/w ceftriaxone IV for Strep Agalactiae for 6 weeks until 24th April     2. ATN (acute tubular necrosis).  -Creatinine on prior admission is 1.26-1.36  - 03/15: Cr 1.67.  - continue holding losartan and f/u outpatient PCP for re evaluation of BMP and re starting losartan     3. HTN   - cw hydral 25mg TID  - c/w home toprol 50 mg q daily  - hold home losartan 50 mg q daily given HILARY   - c/w amlodipine 10 mg q daily.    4.  CAD (coronary artery disease), PAD and hyperlipidemia   - c/w atorvastatin 80 mg q daily  - c/w aspirin 81 mg q daily  - c/w clopidogrel 75 mg q daily.    5. Type 2 diabetes mellitus with hyperglycemia.  - cw metformin 1000 mg and Lantus 40 U at bedtime at home    Patient was discharged to: home    New medications: ceftriaxone IV for Strep Agalactiae for 6 weeks until 25th April, hydral 25mg TID  Changes to old medications: none  Medications that were stopped: hold home losartan 50 mg    Items to follow up as outpatient: BMP    Physical exam at the time of discharge:  General: WDWN  HEENT: NC/AT; PERRL, anicteric sclera; MMM  Neck: supple  Cardiovascular: +S1/S2, RRR  Respiratory: CTA B/L; no W/R/R  Gastrointestinal: soft, NT/ND; +BSx4  Extremities: WWP; no edema, clubbing or cyanosis, rt covered with dressing without any sign of bleeding   Vascular: 2+ radial, DP/PT pulses B/L  Neurological: AAOx3; no focal deficits

## 2022-03-14 NOTE — PHYSICAL THERAPY INITIAL EVALUATION ADULT - PERTINENT HX OF CURRENT PROBLEM, REHAB EVAL
Patient is a 61 yo male with a hx of HTN, DM (on insulin and metformin), PVD s/p angio/stent R leg 4 days ago, known CAD s/p 3vCABG, multiple PCIs with RAJENDRA, PAD w h/o acute limb ischemia w/thrombectomy p/w R big toe discoloration and swelling. Patient was here in 11/2021 for angina and was seen by podiatry. Diagnosed with dry eschar and given 1 week follow up.

## 2022-03-14 NOTE — PROGRESS NOTE ADULT - SUBJECTIVE AND OBJECTIVE BOX
24 hr events  SP big right toe amp    Vital Signs Last 24 Hrs  T(C): 37.5 (14 Mar 2022 10:11), Max: 37.5 (14 Mar 2022 10:11)  T(F): 99.5 (14 Mar 2022 10:11), Max: 99.5 (14 Mar 2022 10:11)  HR: 70 (14 Mar 2022 10:11) (50 - 78)  BP: 146/69 (14 Mar 2022 10:11) (116/83 - 180/77)  BP(mean): 111 (13 Mar 2022 20:28) (94 - 112)  RR: 16 (14 Mar 2022 10:11) (13 - 22)  SpO2: 98% (14 Mar 2022 10:11) (98% - 100%)    I&O's Summary    13 Mar 2022 07:01  -  14 Mar 2022 07:00  --------------------------------------------------------  IN: 0 mL / OUT: 900 mL / NET: -900 mL        Physical Exam:  General: NAD, resting comfortably  Pulmonary: normal resp effort  Cardiovascular: NSR  Abdominal: soft, NT/ND  Extremities: WWP, normal strength, no clubbing/cyanosis/edema. Palpable PT, popliteal, femoral A,  biphasic DP.     Lines/drains/tubes:    LABS:                        10.7   11.58 )-----------( 359      ( 14 Mar 2022 07:17 )             33.7     03-14    137  |  105  |  28<H>  ----------------------------<  123<H>  5.1   |  22  |  1.68<H>    Ca    8.9      14 Mar 2022 07:17  Phos  3.7     03-14  Mg     1.9     03-14      PT/INR - ( 13 Mar 2022 08:07 )   PT: 12.7 sec;   INR: 1.07          PTT - ( 13 Mar 2022 08:07 )  PTT:29.9 sec  Urinalysis Basic - ( 14 Mar 2022 09:06 )    Color: Yellow / Appearance: Clear / SG: >=1.030 / pH: x  Gluc: x / Ketone: NEGATIVE  / Bili: Negative / Urobili: 1.0 E.U./dL   Blood: x / Protein: >=300 mg/dL / Nitrite: NEGATIVE   Leuk Esterase: NEGATIVE / RBC: < 5 /HPF / WBC < 5 /HPF   Sq Epi: x / Non Sq Epi: 0-5 /HPF / Bacteria: None /HPF        CAPILLARY BLOOD GLUCOSE      POCT Blood Glucose.: 102 mg/dL (14 Mar 2022 07:54)  POCT Blood Glucose.: 246 mg/dL (13 Mar 2022 21:58)  POCT Blood Glucose.: 197 mg/dL (13 Mar 2022 17:42)  POCT Blood Glucose.: 126 mg/dL (13 Mar 2022 11:23)      RADIOLOGY & ADDITIONAL TESTS:   24 hr events  SP big right toe amp    Vital Signs Last 24 Hrs  T(C): 37.5 (14 Mar 2022 10:11), Max: 37.5 (14 Mar 2022 10:11)  T(F): 99.5 (14 Mar 2022 10:11), Max: 99.5 (14 Mar 2022 10:11)  HR: 70 (14 Mar 2022 10:11) (50 - 78)  BP: 146/69 (14 Mar 2022 10:11) (116/83 - 180/77)  BP(mean): 111 (13 Mar 2022 20:28) (94 - 112)  RR: 16 (14 Mar 2022 10:11) (13 - 22)  SpO2: 98% (14 Mar 2022 10:11) (98% - 100%)    I&O's Summary    13 Mar 2022 07:01  -  14 Mar 2022 07:00  --------------------------------------------------------  IN: 0 mL / OUT: 900 mL / NET: -900 mL    Physical Exam:  General: NAD, resting comfortably  Pulmonary: normal resp effort  Cardiovascular: NSR  Abdominal: soft, NT/ND  Extremities: WWP, normal strength, no clubbing/cyanosis/edema. Palpable PT, popliteal, femoral A,  biphasic DP.     LABS:                     10.7   11.58 )-----------( 359      ( 14 Mar 2022 07:17 )             33.7     03-14    137  |  105  |  28<H>  ----------------------------<  123<H>  5.1   |  22  |  1.68<H>    Ca    8.9      14 Mar 2022 07:17  Phos  3.7     03-14  Mg     1.9     03-14      PT/INR - ( 13 Mar 2022 08:07 )   PT: 12.7 sec;   INR: 1.07          PTT - ( 13 Mar 2022 08:07 )  PTT:29.9 sec  Urinalysis Basic - ( 14 Mar 2022 09:06 )    Color: Yellow / Appearance: Clear / SG: >=1.030 / pH: x  Gluc: x / Ketone: NEGATIVE  / Bili: Negative / Urobili: 1.0 E.U./dL   Blood: x / Protein: >=300 mg/dL / Nitrite: NEGATIVE   Leuk Esterase: NEGATIVE / RBC: < 5 /HPF / WBC < 5 /HPF   Sq Epi: x / Non Sq Epi: 0-5 /HPF / Bacteria: None /HPF        CAPILLARY BLOOD GLUCOSE      POCT Blood Glucose.: 102 mg/dL (14 Mar 2022 07:54)  POCT Blood Glucose.: 246 mg/dL (13 Mar 2022 21:58)  POCT Blood Glucose.: 197 mg/dL (13 Mar 2022 17:42)  POCT Blood Glucose.: 126 mg/dL (13 Mar 2022 11:23)      RADIOLOGY & ADDITIONAL TESTS:

## 2022-03-14 NOTE — DISCHARGE NOTE PROVIDER - NSDCMRMEDTOKEN_GEN_ALL_CORE_FT
aspirin 81 mg oral delayed release tablet: 1 tab(s) orally once a day  atorvastatin 80 mg oral tablet: 1 tab(s) orally once a day (at bedtime)  Cardiac rehabilitation. 3 times a week for 12 weeks. Dx CAD s/p PCI. Outpatient cardiologist Dr Killian Day. [phone]:   clopidogrel 75 mg oral tablet: 1 tab(s) orally once a day  Cozaar 50 mg oral tablet: 1 tab(s) orally once a day  Lantus Solostar Pen 100 units/mL subcutaneous solution: 40 unit(s) subcutaneous once a day (at bedtime)  metFORMIN 1000 mg oral tablet: 1 tab(s) orally once a day  Toprol-XL 50 mg oral tablet, extended release: 1 tab(s) orally once a day   aspirin 81 mg oral delayed release tablet: 1 tab(s) orally once a day  atorvastatin 80 mg oral tablet: 1 tab(s) orally once a day (at bedtime)  Cardiac rehabilitation. 3 times a week for 12 weeks. Dx CAD s/p PCI. Outpatient cardiologist Dr Killian Day. [phone]:   clopidogrel 75 mg oral tablet: 1 tab(s) orally once a day  Cozaar 50 mg oral tablet: 1 tab(s) orally once a day  insulin lispro 100 units/mL injectable solution:  injectable   Lantus Solostar Pen 100 units/mL subcutaneous solution: 40 unit(s) subcutaneous once a day (at bedtime)  metFORMIN 1000 mg oral tablet: 1 tab(s) orally once a day  Toprol-XL 50 mg oral tablet, extended release: 1 tab(s) orally once a day   amLODIPine 10 mg oral tablet: 1 tab(s) orally every 24 hours  aspirin 81 mg oral delayed release tablet: 1 tab(s) orally once a day  atorvastatin 80 mg oral tablet: 1 tab(s) orally once a day (at bedtime)  Cardiac rehabilitation. 3 times a week for 12 weeks. Dx CAD s/p PCI. Outpatient cardiologist Dr Killian Day. [phone]:   cefTRIAXone: 2 gram(s) intravenous once a day  clopidogrel 75 mg oral tablet: 1 tab(s) orally once a day  gabapentin 300 mg oral capsule: 1 cap(s) orally every 8 hours  hydrALAZINE 25 mg oral tablet: 1 tab(s) orally every 8 hours  Lantus Solostar Pen 100 units/mL subcutaneous solution: 40 unit(s) subcutaneous once a day (at bedtime)  metFORMIN 1000 mg oral tablet: 1 tab(s) orally once a day  polyethylene glycol 3350 oral powder for reconstitution: 17 gram(s) orally once a day  senna oral tablet: 2 tab(s) orally once a day (at bedtime)  Toprol-XL 50 mg oral tablet, extended release: 1 tab(s) orally once a day

## 2022-03-14 NOTE — DISCHARGE NOTE PROVIDER - CARE PROVIDER_API CALL
Henrry Tamez (DPM)  Podiatric Medicine and Surgery  [address], [address]  [place] [address]  Phone: [phone]  Fax: [phone]  Follow Up Time: 1 week   Henrry Tamez (DPM)  Podiatric Medicine and Surgery  [address], [address]  [place] [address]  Phone: [phone]  Fax: [phone]  Follow Up Time: 1 week    [place])  Vascular Surgery  [address] [place] [address]  Phone: [phone]  Fax: [phone]  Follow Up Time: 2 weeks   Henrry Tamez (DPM)  Podiatric Medicine and Surgery  [address] [facility], [address]  [place] [address]  Phone: [phone]  Fax: [phone]  Scheduled Appointment: 03/21/2022 09:15 AM    [place])  Vascular Surgery  [address]  [place] [address]  Phone: [phone]  Fax: [phone]  Scheduled Appointment: 03/28/2022 11:45 AM

## 2022-03-14 NOTE — PROGRESS NOTE ADULT - PROBLEM SELECTOR PLAN 2
Patient with HILARY on possible CKD?. Creatinine on prior admission is 1.26-1.36, today 1.55.  - s/p 1L NS     To do:  - hold home losartan   - continue to monitor  - trend BUN/creatinine Patient presenting with worsening discoloration and swelling of toe. Patient with known discoloration of toe since 11/2021. PMH of diabetes and PVD. XR foot wet read negative for soft tissue emphysema or radiographic signs of OM.  Patient states that he had stent placed at [facility] in [place] on 3/7/22 which he thought would improve toe, however had increasing "blackness" of toe from tip of toe down to base   S/P right hallux amputation at level of 1st MPJ partially closed and packed.    To do:  - F/U OR deep wound culture, dirty margin and clean margin   - c/w ceftriaxone IV for Strep Agalactiae   - f/u Podiatry recs and ID rec

## 2022-03-14 NOTE — PROGRESS NOTE ADULT - SUBJECTIVE AND OBJECTIVE BOX
OVERNIGHT EVENTS:    SUBJECTIVE / INTERVAL HPI: Patient seen and examined at bedside.     VITAL SIGNS:  Vital Signs Last 24 Hrs  T(C): 37.1 (14 Mar 2022 05:41), Max: 37.1 (13 Mar 2022 20:28)  T(F): 98.8 (14 Mar 2022 05:41), Max: 98.8 (14 Mar 2022 05:41)  HR: 78 (14 Mar 2022 05:41) (50 - 78)  BP: 157/76 (14 Mar 2022 05:41) (116/83 - 180/77)  BP(mean): 111 (13 Mar 2022 20:28) (94 - 112)  RR: 18 (14 Mar 2022 05:41) (13 - 22)  SpO2: 98% (14 Mar 2022 05:41) (98% - 100%)    PHYSICAL EXAM:    General: WDWN  HEENT: NC/AT; PERRL, anicteric sclera; MMM  Neck: supple  Cardiovascular: +S1/S2, RRR  Respiratory: CTA B/L; no W/R/R  Gastrointestinal: soft, NT/ND; +BSx4  Extremities: WWP; no edema, clubbing or cyanosis  Vascular: 2+ radial, DP/PT pulses B/L  Neurological: AAOx3; no focal deficits    MEDICATIONS:  MEDICATIONS  (STANDING):  amLODIPine   Tablet 10 milliGRAM(s) Oral every 24 hours  aspirin enteric coated 81 milliGRAM(s) Oral daily  atorvastatin 80 milliGRAM(s) Oral at bedtime  clopidogrel Tablet 75 milliGRAM(s) Oral daily  dextrose 40% Gel 15 Gram(s) Oral once  dextrose 5%. 1000 milliLiter(s) (50 mL/Hr) IV Continuous <Continuous>  dextrose 5%. 1000 milliLiter(s) (100 mL/Hr) IV Continuous <Continuous>  dextrose 50% Injectable 25 Gram(s) IV Push once  dextrose 50% Injectable 12.5 Gram(s) IV Push once  dextrose 50% Injectable 25 Gram(s) IV Push once  gabapentin 300 milliGRAM(s) Oral every 8 hours  glucagon  Injectable 1 milliGRAM(s) IntraMuscular once  heparin   Injectable 5000 Unit(s) SubCutaneous every 8 hours  insulin glargine Injectable (LANTUS) 32 Unit(s) SubCutaneous at bedtime  insulin lispro (ADMELOG) corrective regimen sliding scale   SubCutaneous Before meals and at bedtime  metoprolol succinate ER 50 milliGRAM(s) Oral daily  piperacillin/tazobactam IVPB.. 3.375 Gram(s) IV Intermittent every 6 hours  polyethylene glycol 3350 17 Gram(s) Oral daily  senna 2 Tablet(s) Oral at bedtime    MEDICATIONS  (PRN):  acetaminophen     Tablet .. 650 milliGRAM(s) Oral every 6 hours PRN Mild Pain (1 - 3)  traMADol 25 milliGRAM(s) Oral every 6 hours PRN Severe Pain (7 - 10)      ALLERGIES:  Allergies    iodinated radiocontrast agents (Hives)    Intolerances        LABS:                        10.7   11.58 )-----------( 359      ( 14 Mar 2022 07:17 )             33.7     03-14    137  |  105  |  28<H>  ----------------------------<  123<H>  5.1   |  22  |  1.68<H>    Ca    8.9      14 Mar 2022 07:17  Phos  3.7     03-14  Mg     1.9     03-14      PT/INR - ( 13 Mar 2022 08:07 )   PT: 12.7 sec;   INR: 1.07          PTT - ( 13 Mar 2022 08:07 )  PTT:29.9 sec    CAPILLARY BLOOD GLUCOSE      POCT Blood Glucose.: 102 mg/dL (14 Mar 2022 07:54)      RADIOLOGY & ADDITIONAL TESTS: Reviewed. OVERNIGHT EVENTS: 5 percocet ordered for foot pain     SUBJECTIVE / INTERVAL HPI: Patient seen and examined at bedside. He was resting comfortably and did not appear in acute distress. He mentions that his Rt foot has 5/10 pain which is improved after having the percocet.    VITAL SIGNS:  Vital Signs Last 24 Hrs  T(C): 37.1 (14 Mar 2022 05:41), Max: 37.1 (13 Mar 2022 20:28)  T(F): 98.8 (14 Mar 2022 05:41), Max: 98.8 (14 Mar 2022 05:41)  HR: 78 (14 Mar 2022 05:41) (50 - 78)  BP: 157/76 (14 Mar 2022 05:41) (116/83 - 180/77)  BP(mean): 111 (13 Mar 2022 20:28) (94 - 112)  RR: 18 (14 Mar 2022 05:41) (13 - 22)  SpO2: 98% (14 Mar 2022 05:41) (98% - 100%)    PHYSICAL EXAM:    General: WDWN  HEENT: NC/AT; PERRL, anicteric sclera; MMM  Neck: supple  Cardiovascular: +S1/S2, RRR  Respiratory: CTA B/L; no W/R/R  Gastrointestinal: soft, NT/ND; +BSx4  Extremities: WWP; no edema, clubbing or cyanosis, rt covered with dressing without any sign of bleeding   Vascular: 2+ radial, DP/PT pulses B/L  Neurological: AAOx3; no focal deficits    MEDICATIONS:  MEDICATIONS  (STANDING):  amLODIPine   Tablet 10 milliGRAM(s) Oral every 24 hours  aspirin enteric coated 81 milliGRAM(s) Oral daily  atorvastatin 80 milliGRAM(s) Oral at bedtime  clopidogrel Tablet 75 milliGRAM(s) Oral daily  dextrose 40% Gel 15 Gram(s) Oral once  dextrose 5%. 1000 milliLiter(s) (50 mL/Hr) IV Continuous <Continuous>  dextrose 5%. 1000 milliLiter(s) (100 mL/Hr) IV Continuous <Continuous>  dextrose 50% Injectable 25 Gram(s) IV Push once  dextrose 50% Injectable 12.5 Gram(s) IV Push once  dextrose 50% Injectable 25 Gram(s) IV Push once  gabapentin 300 milliGRAM(s) Oral every 8 hours  glucagon  Injectable 1 milliGRAM(s) IntraMuscular once  heparin   Injectable 5000 Unit(s) SubCutaneous every 8 hours  insulin glargine Injectable (LANTUS) 32 Unit(s) SubCutaneous at bedtime  insulin lispro (ADMELOG) corrective regimen sliding scale   SubCutaneous Before meals and at bedtime  metoprolol succinate ER 50 milliGRAM(s) Oral daily  piperacillin/tazobactam IVPB.. 3.375 Gram(s) IV Intermittent every 6 hours  polyethylene glycol 3350 17 Gram(s) Oral daily  senna 2 Tablet(s) Oral at bedtime    MEDICATIONS  (PRN):  acetaminophen     Tablet .. 650 milliGRAM(s) Oral every 6 hours PRN Mild Pain (1 - 3)  traMADol 25 milliGRAM(s) Oral every 6 hours PRN Severe Pain (7 - 10)      ALLERGIES:  Allergies    iodinated radiocontrast agents (Hives)    Intolerances        LABS:                        10.7   11.58 )-----------( 359      ( 14 Mar 2022 07:17 )             33.7     03-14    137  |  105  |  28<H>  ----------------------------<  123<H>  5.1   |  22  |  1.68<H>    Ca    8.9      14 Mar 2022 07:17  Phos  3.7     03-14  Mg     1.9     03-14      PT/INR - ( 13 Mar 2022 08:07 )   PT: 12.7 sec;   INR: 1.07          PTT - ( 13 Mar 2022 08:07 )  PTT:29.9 sec    CAPILLARY BLOOD GLUCOSE      POCT Blood Glucose.: 102 mg/dL (14 Mar 2022 07:54)      RADIOLOGY & ADDITIONAL TESTS: Reviewed.

## 2022-03-14 NOTE — PROGRESS NOTE ADULT - ASSESSMENT
62yM hx of HTN, DM (on insulin and metformin), PVD s/p angio/stent R leg 3/7/22, known CAD s/p 3vCABG, multiple PCIs with RAJENDRA, PAD w h/o acute limb ischemia w/thrombectomy p/w R big toe discoloration and swelling. Underwent R great toe amputation with podiatry on 3/13/22 for gangrene. Pulse exam unchanged of palpable fem, pop, PT, biphasic DP.    62yM hx of HTN, DM (on insulin and metformin), PVD s/p angio/stent R leg 3/7/22, known CAD s/p 3vCABG, multiple PCIs with RAJENDRA, PAD w h/o acute limb ischemia w/thrombectomy p/w R big toe discoloration and swelling. Underwent R great toe amputation with podiatry on 3/13/22 for gangrene. Pulse exam unchanged of palpable fem, pop, PT, biphasic DP.     Chief Addendum:  - No acute vascular surgery intervention. Patient with recent RLE intervention at OSH with palpable PT pulse; per report from podiatry, patient had ample bleeding during toe amputation  - Signing off at this time. Patient should follow-up with his vascular surgeon after discharge, or can follow-up with Dr. Martínez. Call [phone]  - Please reconsult as needed or if there are concerns for wound healing

## 2022-03-14 NOTE — PROGRESS NOTE ADULT - ASSESSMENT
63 yo male with a hx of DM, PVD s/p angio/stent 4 days ago (at [facility]) p/w R big toe discoloration and swelling.  Podiatry consulted to evaluate for wet gangrene. Of note, pt is afebrile, VSS, with  WBC dowtrending. Clinical image of right hallux highly suspicious of wet gangrene. Pt now S/P right hallux amputation at level of 1st MPJ partially closed and packed.    Plan:  - Cont ABX per primary   - F/U OR deep wound culture, dirty margin and clean margin (NGTD x 1 day)  - Pt tolerated procedure well  - recc heel WBAT to RLE  - Local wound care: packing pulled, surgical site irrigated, dressed DSD, Kerlix and ACE  - rest of care per primary team    Podiatry following.

## 2022-03-14 NOTE — PROGRESS NOTE ADULT - ATTENDING COMMENTS
Patient was seen and examined at bedside on 3/14/2022 at 930am. Agree with history as above. Denies CP, SOB. ROS is otherwise negative. Vitals, labwork and pertinent imaging reviewed. Physical exam - NAD, AAO x 4, PERRLA, EOMI, MMM, supple neck, chest - CTA b/l, CV - s1s2, no m/r/g, abd - soft, TTP in RLQ, + BS, ext - wwp , psych - calm affect, skin - ulcer    Plan  -Pt s/p OR with Podiatry  -Will f/u clean margin cultures and pathology  -ID consulted

## 2022-03-14 NOTE — DISCHARGE NOTE PROVIDER - NSDCFUSCHEDAPPT_GEN_ALL_CORE_FT
CHARITY DUENAS ; 06/02/2022 ; [facility] [address] CHARITY DUENAS ; 03/28/2022 ; [facility] [address] [address] [facility] ; N[facility] Gastro [address]

## 2022-03-14 NOTE — PROGRESS NOTE ADULT - PROBLEM SELECTOR PLAN 5
Patient with h/o HLD     - c/w atorvastatin 80 mg q daily Patient with extensive CAD s/p CAGB and multiple PCIs with RAJENDRA.       To do:  - c/w atorvastatin 80 mg q daily  - c/w aspirin 81 mg q daily  - c/w clopidogrel 75 mg q daily

## 2022-03-14 NOTE — CONSULT NOTE ADULT - SUBJECTIVE AND OBJECTIVE BOX
HPI:  Patient is a 61 yo male with a hx of HTN, DM (on insulin and metformin), PVD s/p angio/stent R leg 4 days ago, known CAD s/p 3vCABG, multiple PCIs with RAJENDRA, PAD w h/o acute limb ischemia w/thrombectomy p/w R big toe discoloration and swelling. Patient was here in 11/2021 for angina and was seen by podiatry. Diagnosed with dry eschar and given 1 week follow up. Patient states that he had stent placed at [facility] in R leg on Monday (4 days ago) which he thought would improve toe, however had increasing "blackness" of toe from tip of toe down to base, malodor and throbbing since Monday.  Patient denies pain stating he has not been feeling in that toe since initial lesion formed. Patient has not been taking metformin or Lantus over past few days due to misunderstanding since stent placement, except for Lantus last night. Denies fevers, chills, fatigue, syncope, N/V/C/D. Endorses b/l tinging pain in the legs which he has had for a long time and workup has attributed to vascular disease. Reports compliance with all other medications.    In the ED,  Vitals: T 98.4 HR 75 /78 RR 18 Sa 99% RA   Labs: ESR 90 CRP 22 WBC 14.2 RBC 3.95 Hb 11.7 MCV 87.6 Plt 418 Na 134 BUN 25 Cr 1.52 Glu 249, Alb 3.2   Imaging:    CXR: WNL     XR foot: podiatry XR wet read right foot negative for soft tissue emphysema or radiographic signs of OM (osteopenia, cortical erosion, periosteal rxn)  ED Course:  s/p vanc and zosyn x1, 1L NS. podiatry consulted.    (11 Mar 2022 17:38)    Patient went to the OR for first toe amputation on 3/13. No purulence noted       PAST MEDICAL & SURGICAL HISTORY:  HTN (hypertension)    Diabetes    CAD (coronary artery disease)    Hyperlipidemia    Peripheral artery disease    S/P CABG x 3    H/O vascular surgery  R femoral thrombectomy    S/P coronary artery stent placement    Occlusion of femoral artery          REVIEW OF SYSTEMS:    General:	 no weakness; no fevers, no chills  Skin/Breast: no rash  Respiratory and Thorax: no SOB, no cough  Cardiovascular:	No chest pain  Gastrointestinal:	 no nausea, vomiting , diarrhea  Genitourinary:	no dysuria, no difficulty urinating, no hematuria  Musculoskeletal:	no weakness, no joint swelling/pain  Neurological:	no focal weakness/numbness  Endocrine:	no polyuria, no polydipsia      ANTIBIOTICS:  MEDICATIONS  (STANDING):  amLODIPine   Tablet 10 milliGRAM(s) Oral every 24 hours  aspirin enteric coated 81 milliGRAM(s) Oral daily  atorvastatin 80 milliGRAM(s) Oral at bedtime  cefTRIAXone   IVPB 2000 milliGRAM(s) IV Intermittent every 24 hours  clopidogrel Tablet 75 milliGRAM(s) Oral daily  dextrose 40% Gel 15 Gram(s) Oral once  dextrose 5%. 1000 milliLiter(s) (50 mL/Hr) IV Continuous <Continuous>  dextrose 5%. 1000 milliLiter(s) (100 mL/Hr) IV Continuous <Continuous>  dextrose 50% Injectable 25 Gram(s) IV Push once  dextrose 50% Injectable 12.5 Gram(s) IV Push once  dextrose 50% Injectable 25 Gram(s) IV Push once  gabapentin 300 milliGRAM(s) Oral every 8 hours  glucagon  Injectable 1 milliGRAM(s) IntraMuscular once  heparin   Injectable 5000 Unit(s) SubCutaneous every 8 hours  insulin glargine Injectable (LANTUS) 32 Unit(s) SubCutaneous at bedtime  insulin lispro (ADMELOG) corrective regimen sliding scale   SubCutaneous Before meals and at bedtime  metoprolol succinate ER 50 milliGRAM(s) Oral daily  polyethylene glycol 3350 17 Gram(s) Oral daily  senna 2 Tablet(s) Oral at bedtime    MEDICATIONS  (PRN):  acetaminophen     Tablet .. 650 milliGRAM(s) Oral every 6 hours PRN Mild Pain (1 - 3)  traMADol 25 milliGRAM(s) Oral every 6 hours PRN Severe Pain (7 - 10)      Allergies    iodinated radiocontrast agents (Hives)    Intolerances        SOCIAL HISTORY:    FAMILY HISTORY:      Vital Signs Last 24 Hrs  T(C): 37.1 (14 Mar 2022 16:03), Max: 37.5 (14 Mar 2022 10:11)  T(F): 98.7 (14 Mar 2022 16:03), Max: 99.5 (14 Mar 2022 10:11)  HR: 71 (14 Mar 2022 16:03) (65 - 78)  BP: 177/71 (14 Mar 2022 16:03) (139/67 - 180/77)  BP(mean): 111 (13 Mar 2022 20:28) (111 - 111)  RR: 17 (14 Mar 2022 16:03) (16 - 18)  SpO2: 96% (14 Mar 2022 16:03) (96% - 99%)    PHYSICAL EXAM:  Constitutional: non-toxic, no distress  Eyes:  no icterus   Ear/Nose/Throat: no oral lesion, no sinus tenderness on percussion	  Neck:no JVD, no lymphadenopathy, supple  Respiratory: CTA jimmy  Cardiovascular: S1S2 RRR, no murmurs  Gastrointestinal:soft, (+) BS, no HSM  Extremities:  right foot s/p 1st toe amputation  Vascular: DP Pulse:	right normal; left normal            LABS:                        10.7   11.58 )-----------( 359      ( 14 Mar 2022 07:17 )             33.7     03-14    137  |  105  |  28<H>  ----------------------------<  123<H>  5.1   |  22  |  1.68<H>    Ca    8.9      14 Mar 2022 07:17  Phos  3.7     03-14  Mg     1.9     03-14      PT/INR - ( 13 Mar 2022 08:07 )   PT: 12.7 sec;   INR: 1.07          PTT - ( 13 Mar 2022 08:07 )  PTT:29.9 sec  Urinalysis Basic - ( 14 Mar 2022 09:06 )    Color: Yellow / Appearance: Clear / SG: >=1.030 / pH: x  Gluc: x / Ketone: NEGATIVE  / Bili: Negative / Urobili: 1.0 E.U./dL   Blood: x / Protein: >=300 mg/dL / Nitrite: NEGATIVE   Leuk Esterase: NEGATIVE / RBC: < 5 /HPF / WBC < 5 /HPF   Sq Epi: x / Non Sq Epi: 0-5 /HPF / Bacteria: None /HPF        MICROBIOLOGY:    Culture - Acid Fast - Tissue w/Smear (03.13.22 @ 22:13)    Specimen Source: .Tissue AFB #2 RIGHT TOE BONE CULTURE    Acid Fast Bacilli Smear:   No acid fast bacilli seen by fluorochrome stain      Culture - Surgical Swab (03.13.22 @ 13:42)    Gram Stain:   No organisms seen  Rare WBC's    Specimen Source: .Surgical Swab #1 RIGHT HALLUX DEEP WOUND CULTURE    Culture Results:   Rare Streptococcus agalactiae (Group B)  Susceptibility to follow.    RADIOLOGY & ADDITIONAL STUDIES:        < from: Xray Foot AP + Lateral + Oblique, Right (03.11.22 @ 16:30) >    Frontal, lateral and oblique views of the right foot shows & 3 views of   the great toe show no evidence for acute fracture, subluxation or   dislocation. Moderate soft tissue swelling surrounding the great toe. No   evidence of osteomyelitis. Consider MRI as clinically warranted. Mild   vascular calcifications.    IMPRESSION:  Soft tissue swelling surrounding the great toe.    < end of copied text >

## 2022-03-14 NOTE — DISCHARGE NOTE PROVIDER - CARE PROVIDERS DIRECT ADDRESSES
,DirectAddress_Unknown ,DirectAddress_Unknown,[email or web address]

## 2022-03-14 NOTE — PROGRESS NOTE ADULT - SUBJECTIVE AND OBJECTIVE BOX
Patient is a 62y old  Male who presents with a chief complaint of foot gangrene (14 Mar 2022 08:15)      INTERVAL HPI/ OVERNIGHT EVENTS: Pt evaluated at bedside with chief resident. No new pedal complaints at this time. Dressing site was C/D/I.       LABS                        10.7   11.58 )-----------( 359      ( 14 Mar 2022 07:17 )             33.7     03-14    137  |  105  |  28<H>  ----------------------------<  123<H>  5.1   |  22  |  1.68<H>    Ca    8.9      14 Mar 2022 07:17  Phos  3.7     03-14  Mg     1.9     03-14      PT/INR - ( 13 Mar 2022 08:07 )   PT: 12.7 sec;   INR: 1.07          PTT - ( 13 Mar 2022 08:07 )  PTT:29.9 sec    ICU Vital Signs Last 24 Hrs  T(C): 37.1 (14 Mar 2022 05:41), Max: 37.1 (13 Mar 2022 20:28)  T(F): 98.8 (14 Mar 2022 05:41), Max: 98.8 (14 Mar 2022 05:41)  HR: 78 (14 Mar 2022 05:41) (50 - 78)  BP: 157/76 (14 Mar 2022 05:41) (116/83 - 180/77)  BP(mean): 111 (13 Mar 2022 20:28) (94 - 112)  ABP: --  ABP(mean): --  RR: 18 (14 Mar 2022 05:41) (13 - 22)  SpO2: 98% (14 Mar 2022 05:41) (98% - 100%)      RADIOLOGY    MICROBIOLOGY    PHYSICAL EXAM  General: NAD, AA0x3  Lower Extremity Focused:  Vasc: non palpable pulses b/l 2/2 edema. Monophasic signals to DP &PT b/l. Erythema present to right hallux.   Derm: Necrotic tissue present to right distal hallux, erythematous borders with fibrotic edges. Negative for purulence, negative for probing or undermining. Positive for malodor. Hallux nail is partially detached at distal nail border  3/13 - pt is now s/p right hallux amputation with stitches intact and partial opening present. Sanguinous drainage. Negative for malodor, proximal streaking, or other signs of infection at this time.  Neuro: Protective sensation diminished  MSK: Pt denies tenderness to right hallux. MMT reveals 5/5 strength in all compartments.  Patient is a 62y old  Male who presents with a chief complaint of foot gangrene (14 Mar 2022 08:15)      INTERVAL HPI/ OVERNIGHT EVENTS: Pt evaluated at bedside with chief resident. No new pedal complaints at this time. Dressing site was C/D/I.       LABS                        10.7   11.58 )-----------( 359      ( 14 Mar 2022 07:17 )             33.7     03-14    137  |  105  |  28<H>  ----------------------------<  123<H>  5.1   |  22  |  1.68<H>    Ca    8.9      14 Mar 2022 07:17  Phos  3.7     03-14  Mg     1.9     03-14      PT/INR - ( 13 Mar 2022 08:07 )   PT: 12.7 sec;   INR: 1.07          PTT - ( 13 Mar 2022 08:07 )  PTT:29.9 sec    ICU Vital Signs Last 24 Hrs  T(C): 37.1 (14 Mar 2022 05:41), Max: 37.1 (13 Mar 2022 20:28)  T(F): 98.8 (14 Mar 2022 05:41), Max: 98.8 (14 Mar 2022 05:41)  HR: 78 (14 Mar 2022 05:41) (50 - 78)  BP: 157/76 (14 Mar 2022 05:41) (116/83 - 180/77)  BP(mean): 111 (13 Mar 2022 20:28) (94 - 112)  ABP: --  ABP(mean): --  RR: 18 (14 Mar 2022 05:41) (13 - 22)  SpO2: 98% (14 Mar 2022 05:41) (98% - 100%)      RADIOLOGY    MICROBIOLOGY    PHYSICAL EXAM  General: NAD, AA0x3  Lower Extremity Focused:  Vasc: non palpable pulses b/l 2/2 edema. Monophasic signals to DP &PT b/l. Erythema present to right hallux.   Derm: Necrotic tissue present to right distal hallux, erythematous borders with fibrotic edges. Negative for purulence, negative for probing or undermining. Positive for malodor. Hallux nail is partially detached at distal nail border  3/13 - pt is now s/p right hallux amputation with stitches intact and partial opening present. Sanguinous drainage. Negative for malodor, proximal streaking, or other signs of infection at this time.  Neuro: Protective sensation diminished  MSK: Pt denies tenderness to right hallux. MMT reveals 5/5 strength in all compartments.     MRN: [id number]  Age: 62y    Hip Internal ROM R: 40  L: 45    Hip External ROM R:  40 L:    40      Sagittal Knee Position:  Right: [x ] Normal, [ ] Flexed, [ ] Recurvatum  Left: [x ] Normal, [ ] Flexed, [ ] Recurvatum    Frontal Plane Leg:  Right: [x ] Normal, [ ] Varum, [ ] Valgum  Left: [x ] Normal, [ ] Varum, [ ] Valgum    Malleolar Position:  Right: [x ] External, [ ] Internal  Left: [ x] External, [ ] Internal    Limb Length Discrepancy  Right: [ ] Longer, [ ] Graton  _0__ cm  Left: [ ] Longer, [ ] Graton  _0__ cm    Ankle, knee extended  Right: [ x] Normal, [ ] Limited, [ ] Crepitus  Left: [ x] Normal, [ ] Limited, [ ] Crepitus    Ankle, knee flexed  Right: [x ] Normal, [ ] Limited, [ ] Crepitus  Left: [ x] Normal, [ ] Limited, [ ] Crepitus    RCSP  Right: [ ] Vertical, [ ] Varus, [x ] Valgus  Left: [ ] Vertical, [ ] Varus, [ x] Valgus    NCSP:  Right: [ ] Vertical, [ ] Varus, [ x] Valgus  Left: [ ] Vertical, [ ] Varus, [x ] Valgus    STJ ROM:  Right: [x ] Normal, [ ] Limited, [ ] Crepitus  Left: [x ] Normal, [ ] Limited, [ ] Crepitus    MTJ ROM:  Right: [ ] Normal, [x ] Limited, [ ] Crepitus  Left: [ ] Normal, [x ] Limited, [ ] Crepitus    FF to RF Relationship  Right: [x ] Normal, [ ] Varus, [ ] Valgus  Left: [x ] Normal, [ ] Varus, [ ] Valgus    1st Ray Position  Right: [x ] Neutral, [ ] Dorsiflexed, [ ] Plantarflexed, [ ] Hypermobile  Left: [x ] Neutral, [ ] Dorsiflexed, [ ] Plantarflexed, [ ] Hypermobile    1st MTP ROM, loaded  Right: [ ] Normal, [x ] Limited, [ ] Crepitus  Left: [ ] Normal, [x ] Limited, [ ] Crepitus    1st MTP ROM, unloaded  Right: [x ] Normal, [ ] Limited, [ ] Crepitus  Left: [x ] Normal, [ ] Limited, [ ] Crepitus    Muscle Strength  Posterior Group  R: [x ] 5, [ ] 4, [ ] 3, [ ] 2, [ ] 1  L: [x ] 5, [ ] 4, [ ] 3, [ ] 2, [ ] 1  Anterior Group  R: [ x] 5, [ ] 4, [ ] 3, [ ] 2, [ ] 1  L: [x ] 5, [ ] 4, [ ] 3, [ ] 2, [ ] 1  Medial Group  R: [x ] 5, [ ] 4, [ ] 3, [ ] 2, [ ] 1  L: [x ] 5, [ ] 4, [ ] 3, [ ] 2, [ ] 1  Lateral Group  R: [x ] 5, [ ] 4, [ ] 3, [ ] 2, [ ] 1  L: [x ] 5, [ ] 4, [ ] 3, [ ] 2, [ ] 1    Gait Examination: Neutral gait & stance  Treatment: Per plan

## 2022-03-14 NOTE — PROGRESS NOTE ADULT - PROBLEM SELECTOR PLAN 9
F: per PO   E: replete to K>4, Mg>2, Phos>2.5  N: diabetic diet  Ppx: aspirin 81   Code Status: FULL  Dispo: [facility] Patient with T2DM on metformin 1000 mg and Lantus 40 U at bedtime at home. Presenting with hyperglycemia likely in the setting of infection/inflammation vs missed Lantus/metformin doses over past few days.     To do:  - c/w Lantus 32 U at bedtime (80% of home dose)   - readjust dosing as needed based on mISS requirements tomorrow.   - mISS   - f/u A1c

## 2022-03-14 NOTE — CONSULT NOTE ADULT - SUBJECTIVE AND OBJECTIVE BOX
Patient is a 62y old  Male who presents with a chief complaint of foot gangrene (14 Mar 2022 08:15)       HPI:  Patient is a 63 yo male with a hx of HTN, DM (on insulin and metformin), PVD s/p angio/stent R leg 4 days ago, known CAD s/p 3vCABG, multiple PCIs with RAJENDRA, PAD w h/o acute limb ischemia w/thrombectomy p/w R big toe discoloration and swelling. Patient was here in 11/2021 for angina and was seen by podiatry. Diagnosed with dry eschar and given 1 week follow up. Patient states that he had stent placed at [facility] in R leg on Monday (4 days ago) which he thought would improve toe, however had increasing "blackness" of toe from tip of toe down to base, malodor and throbbing since Monday.  Patient denies pain stating he has not been feeling in that toe since initial lesion formed. Patient has not been taking metformin or Lantus over past few days due to misunderstanding since stent placement, except for Lantus last night. Denies fevers, chills, fatigue, syncope, N/V/C/D. Endorses b/l tinging pain in the legs which he has had for a long time and workup has attributed to vascular disease. Reports compliance with all other medications.    In the ED,  Vitals: T 98.4 HR 75 /78 RR 18 Sa 99% RA   Labs: ESR 90 CRP 22 WBC 14.2 RBC 3.95 Hb 11.7 MCV 87.6 Plt 418 Na 134 BUN 25 Cr 1.52 Glu 249, Alb 3.2   Imaging:    CXR: WNL     XR foot: podiatry XR wet read right foot negative for soft tissue emphysema or radiographic signs of OM (osteopenia, cortical erosion, periosteal rxn)  ED Course:  s/p vanc and zosyn x1, 1L NS. podiatry consulted.    (11 Mar 2022 17:38)      PAST MEDICAL & SURGICAL HISTORY:  HTN (hypertension)    Diabetes    CAD (coronary artery disease)    Hyperlipidemia    Peripheral artery disease    S/P CABG x 3    H/O vascular surgery  R femoral thrombectomy    S/P coronary artery stent placement    Occlusion of femoral artery        MEDICATIONS  (STANDING):  amLODIPine   Tablet 10 milliGRAM(s) Oral every 24 hours  aspirin enteric coated 81 milliGRAM(s) Oral daily  atorvastatin 80 milliGRAM(s) Oral at bedtime  clopidogrel Tablet 75 milliGRAM(s) Oral daily  dextrose 40% Gel 15 Gram(s) Oral once  dextrose 5%. 1000 milliLiter(s) (50 mL/Hr) IV Continuous <Continuous>  dextrose 5%. 1000 milliLiter(s) (100 mL/Hr) IV Continuous <Continuous>  dextrose 50% Injectable 25 Gram(s) IV Push once  dextrose 50% Injectable 12.5 Gram(s) IV Push once  dextrose 50% Injectable 25 Gram(s) IV Push once  gabapentin 300 milliGRAM(s) Oral every 8 hours  glucagon  Injectable 1 milliGRAM(s) IntraMuscular once  heparin   Injectable 5000 Unit(s) SubCutaneous every 8 hours  insulin glargine Injectable (LANTUS) 32 Unit(s) SubCutaneous at bedtime  insulin lispro (ADMELOG) corrective regimen sliding scale   SubCutaneous Before meals and at bedtime  metoprolol succinate ER 50 milliGRAM(s) Oral daily  piperacillin/tazobactam IVPB.. 2.25 Gram(s) IV Intermittent every 6 hours  polyethylene glycol 3350 17 Gram(s) Oral daily  senna 2 Tablet(s) Oral at bedtime    MEDICATIONS  (PRN):  acetaminophen     Tablet .. 650 milliGRAM(s) Oral every 6 hours PRN Mild Pain (1 - 3)  traMADol 25 milliGRAM(s) Oral every 6 hours PRN Severe Pain (7 - 10)      FAMILY HISTORY:      CBC Full  -  ( 14 Mar 2022 07:17 )  WBC Count : 11.58 K/uL  RBC Count : 3.74 M/uL  Hemoglobin : 10.7 g/dL  Hematocrit : 33.7 %  Platelet Count - Automated : 359 K/uL  Mean Cell Volume : 90.1 fl  Mean Cell Hemoglobin : 28.6 pg  Mean Cell Hemoglobin Concentration : 31.8 gm/dL  Auto Neutrophil # : 8.47 K/uL  Auto Lymphocyte # : 1.29 K/uL  Auto Monocyte # : 1.44 K/uL  Auto Eosinophil # : 0.27 K/uL  Auto Basophil # : 0.06 K/uL  Auto Neutrophil % : 73.3 %  Auto Lymphocyte % : 11.1 %  Auto Monocyte % : 12.4 %  Auto Eosinophil % : 2.3 %  Auto Basophil % : 0.5 %      03-14    137  |  105  |  28<H>  ----------------------------<  123<H>  5.1   |  22  |  1.68<H>    Ca    8.9      14 Mar 2022 07:17  Phos  3.7     03-14  Mg     1.9     03-14              Radiology:    < from: Xray Foot AP + Lateral + Oblique, Right (03.11.22 @ 16:30) >  ACC: [id number] EXAM:  XR TOE(S) MIN 2 VIEWS RT                        ACC: [id number] EXAM:  XR FOOT COMP MIN 3 VIEWS RT                          PROCEDURE DATE:  03/11/2022          INTERPRETATION:  TECHNIQUE: Three views right foot and 3 views greattoe    COMPARISON: None    CLINICAL HISTORY: Right foot osteomyelitis    FINDINGS:    Frontal, lateral and oblique views of the right foot shows & 3 views of   the great toe show no evidence for acute fracture, subluxation or   dislocation. Moderate soft tissue swelling surrounding the great toe. No   evidence of osteomyelitis. Consider MRI as clinically warranted. Mild   vascular calcifications.    IMPRESSION:  Soft tissue swelling surrounding the great toe.                  Vital Signs Last 24 Hrs  T(C): 37.1 (14 Mar 2022 05:41), Max: 37.1 (13 Mar 2022 20:28)  T(F): 98.8 (14 Mar 2022 05:41), Max: 98.8 (14 Mar 2022 05:41)  HR: 78 (14 Mar 2022 05:41) (50 - 78)  BP: 157/76 (14 Mar 2022 05:41) (116/83 - 180/77)  BP(mean): 111 (13 Mar 2022 20:28) (94 - 112)  RR: 18 (14 Mar 2022 05:41) (13 - 22)  SpO2: 98% (14 Mar 2022 05:41) (98% - 100%)        REVIEW OF SYSTEMS:    CONSTITUTIONAL: No fever, weight loss, or fatigue  EYES: No eye pain, visual disturbances, or discharge  ENMT:  No difficulty hearing, tinnitus, vertigo; No sinus or throat pain  NECK: No pain or stiffness  BREASTS: No pain, masses, or nipple discharge  RESPIRATORY: No cough, wheezing, chills or hemoptysis; No shortness of breath  CARDIOVASCULAR: No chest pain, palpitations, dizziness, or leg swelling  GASTROINTESTINAL: No abdominal or epigastric pain. No nausea, vomiting, or hematemesis; No diarrhea or constipation. No melena or hematochezia.  GENITOURINARY: No dysuria, frequency, hematuria, or incontinence  NEUROLOGICAL: No headaches, memory loss, loss of strength, numbness, or tremors  SKIN: No itching, burning, rashes, or lesions   LYMPH NODES: No enlarged glands  ENDOCRINE: No heat or cold intolerance; No hair loss  MUSCULOSKELETAL: No joint pain or swelling; No muscle, back, or extremity pain  PSYCHIATRIC: No depression, anxiety, mood swings, or difficulty sleeping  HEME/LYMPH: No easy bruising, or bleeding gums  ALLERGY AND IMMUNOLOGIC: No hives or eczema  VASCULAR:  per HPI          Physical Exam:  WDWN 63 yo  gentleman lying in semi pena's position, awake, alert, no acute complaints    Head: normocephalic, atraumatic    Eyes: PERRLA, EOMI, no nystagmus, sclera anicteric    ENT: nasal discharge, uvula midline, no oropharyngeal erythema/exudate    Neck: supple, negative JVD, negative carotid bruits, no thyromegaly    Chest: CTA bilaterally, neg wheeze/ rhonchi/ rales/ crackles/ egophany    Cardiovascular: regular rate and rhythm, neg murmurs/rubs/gallops    Abdomen: soft, non distended, non tender to palpation in all 4 quadrants, negative rebound/guarding, normal bowel sounds    Extremities:  r foot dressing    Neurologic Exam:    Alert and oriented x 3      Motor Exam:    Right UE:             > 4/5    Left UE:               > 4/5      Right LE:             > 4/5    Left LE:               > 4/5               Sensation:           intact to light touch x 4 extremities                                              DTR:                  biceps/brachioradialis: equal                                                     patella/ankle: equal                            Gait:  not tested              PM&R Impression:    1)   2) R LE heel WBAT per Podiatry    Recommendations/ Plan :    1) Physical / Occupational therapy focusing on therapeutic exercises, bed mobility/transfer out of bed evaluation, progressive ambulation with assistive devices prn.    2) Anticipated Disposition Plan/Recs:    pending functional progress

## 2022-03-14 NOTE — PROGRESS NOTE ADULT - PROBLEM SELECTOR PLAN 7
Patient with PAD/PVD s/p angio/stent 4 days ago at [facility] and history of thrombectomy.   Patient states that he had stent placed at [facility] in R leg on 3/7/22 which he thought would improve toe, however had increasing "blackness" of toe from tip of toe down to base  - vascular consulted - no acute interventions    To do:  - c/w atorvastatin 80 mg q daily  - c/w aspirin 81 mg q daily  - c/w clopidogrel 75 mg q daily Patient with normocytic anemia hgb of 11.7-->10.7. Trending laterally since last admission. No sign of obvious bleed.     To do:  - continue to monitor  - transfuse Hgb <8 given cardiac history  - maintain active T&S

## 2022-03-14 NOTE — DISCHARGE NOTE PROVIDER - NSDCCPCAREPLAN_GEN_ALL_CORE_FT
PRINCIPAL DISCHARGE DIAGNOSIS  Diagnosis: Wet gangrene  Assessment and Plan of Treatment: You presented with worsening discoloration and swelling of toe and an immediate X ray rt foot showed Soft tissue swelling surrounding the great toe.          PRINCIPAL DISCHARGE DIAGNOSIS  Diagnosis: Wet gangrene  Assessment and Plan of Treatment: You presented with worsening discoloration and swelling of toe and an immediate X ray rt foot showed Soft tissue swelling surrounding the great toe. Podiatry was immediately consulted and they performed a right toe amputaion. Blood culture for infection was negative. We started you on 6 weeks of IV antibiotics ceftriaxone which you continue till april 24th.  Please continue to take tylenol for pain as needed.   Contact your healthcare provider right away if you have any of the following:   Continuous bleeding through the bandage  Excessive swelling, increased bleeding, or redness  Fever over 100.4°F (38°C) or chills  Pain unrelieved by pain medicines  Foot feels cold to the touch or numb  Increased pain in your leg or foot  Chest pain or shortness of breath  Anything unusual that concerns you        SECONDARY DISCHARGE DIAGNOSES  Diagnosis: Acute kidney injury with acute tubular necrosis  Assessment and Plan of Treatment:     Diagnosis: Hypertension  Assessment and Plan of Treatment:      PRINCIPAL DISCHARGE DIAGNOSIS  Diagnosis: Wet gangrene  Assessment and Plan of Treatment: You presented with worsening discoloration and swelling of toe and an immediate X ray rt foot showed Soft tissue swelling surrounding the great toe. Podiatry was immediately consulted and they performed a right toe amputaion. Blood culture for infection was negative. We started you on 6 weeks of IV antibiotics ceftriaxone which you continue till april 24th.  Please continue to take tylenol for pain as needed.   Contact your healthcare provider right away if you have any of the following:   Continuous bleeding through the bandage  Excessive swelling, increased bleeding, or redness  Fever over 100.4°F (38°C) or chills  Pain unrelieved by pain medicines  Foot feels cold to the touch or numb  Increased pain in your leg or foot  Chest pain or shortness of breath  Anything unusual that concerns you        SECONDARY DISCHARGE DIAGNOSES  Diagnosis: Acute kidney injury with acute tubular necrosis  Assessment and Plan of Treatment: Your baseline creatinine is 1.26-1.36 and on the date of discharge it is 1.67. This shows that you had some kidney injury. [place] of this we withheld Losartan which is the blood pressure medication you take at home. Instead we added Hydral 25mg which you    Diagnosis: Hypertension  Assessment and Plan of Treatment:      PRINCIPAL DISCHARGE DIAGNOSIS  Diagnosis: Wet gangrene  Assessment and Plan of Treatment: You presented with worsening discoloration and swelling of toe and an immediate X ray rt foot showed Soft tissue swelling surrounding the great toe. Podiatry was immediately consulted and they performed a right toe amputaion. Blood culture for infection was negative. We started you on 6 weeks of IV antibiotics ceftriaxone which you continue till april 24th.  Please continue to take tylenol for pain as needed.  Please follow up with Dr. Martínez on 03/28/2022 at 11:45am for vascular and Dr. Franco on 03/21/2022 at 9: 15 am for podiatry follow up.   Contact your healthcare provider right away if you have any of the following:   Continuous bleeding through the bandage  Excessive swelling, increased bleeding, or redness  Fever over 100.4°F (38°C) or chills  Pain unrelieved by pain medicines  Foot feels cold to the touch or numb  Increased pain in your leg or foot  Chest pain or shortness of breath  Anything unusual that concerns you        SECONDARY DISCHARGE DIAGNOSES  Diagnosis: Vascular disease, peripheral  Assessment and Plan of Treatment: You have history of coronary and peripheral vascular disease which is likely in the setting of hyperlipidemia and diabetes mellitus. Please continue taking atorvastatin 80 mg daily, aspirin 81 mg q daily, clopidogrel 75 mg daily. These are all oral medications. Please follow up with Dr. Martínez on 03/28/2022 at 11:45am for vascular and Dr. Franco on 03/21/2022 at 9: 15 am for podiatry follow up. Please call 911 if: Trouble breathing and chest pain  Feeling lightheaded, faint, or dizzy  Rapid heart beat  Slower than usual heart rate compared to your normal  Angina with weakness, dizziness, fainting, heavy sweating, nausea, or vomiting  Extreme drowsiness, confusion  Weakness of an arm or leg or one side of the face  Difficulty with speech or vision    Diagnosis: Acute kidney injury with acute tubular necrosis  Assessment and Plan of Treatment: Your baseline creatinine is 1.26-1.36 and on the date of discharge it is 1.67. This shows that you had some kidney injury. [place] of this we withheld Losartan which is the blood pressure medication you take at home. Instead we added Hydral 25mg which you will have to take three times a day. Please follow up with your outpatient PCP for further evalutaion and re assessment.  Call your healthcare provider right away if you have any of these:  Signs of bladder infection, such as urinating more often, burning or pain when you pee, pain above your pubic bone, blood in your urine, or trouble starting your urine stream  Signs of infection around your catheter, such as redness, swelling, warmth, or fluid leaking  Rapid weight loss or weight gain, such as 3 pounds or more in 24 hours or 6 pounds or more in 7 days  Fever above 100.4° F ( 38°C ) or as directed by your healthcare provider  Chills  Muscle aches  Night sweats  Very little or no urine output  Swelling of your hands, legs, or feet  Back pain  Abdominal pain  Extreme tiredness    Diagnosis: Hypertension  Assessment and Plan of Treatment: You have history of high blood pressure. Please continue to take - cw hydral 25mg three times a day orally, toprol 50 mg and amlodipine 10 mg once daily orally. Please continue to hold home losartan 50 mg daily given the kidney injury. Please follow up with your outpatient PCP for further evalutaion and re assessment. Here are some helpful tips:   Stay at a healthy weight. Get help to lose any extra pounds (kilograms). Often times meeting with a dietitian can help you identify changes that can be made to your diet to help with weight loss.  Cut back on salt.  Limit canned, dried, packaged, and fast foods.  Don’t add salt to your food at the table.  Season foods with herbs instead of salt when you cook.  Request no added salt when you go to a restaurant.  The American Heart Association (AHA) says the ideal amount of sodium is no more than 1,500 mg a day.  But because Americans eat so much salt, you can make a positive change by cutting back to even 2,300 mg of sodium a day (1 teaspoon).   Follow the DASH (Dietary Approaches to Stop Hypertension) eating plan. This plan recommends vegetables, fruits, whole gains, and other heart healthy foods.  Eat food rich in potassium.  Begin an exercise program. Ask your healthcare provider how to get started. The AHA recommends aerobic exercise 3 to 4 times a week for an average of 40 minutes at a time to lower blood pressure, with your provider's approval. Simple activities such as walking or gardening can help.  Break the smoking habit. Enroll in a stop-smoking program to improve your chances of success. Ask your healthcare provider about programs and medicines to help you stop smoking.  Limit drinks that contain caffeine such as coffee, black or green tea, and cola to 2 per day.  Never take stimulants such as amphetamines or cocaine. These  drugs can be deadly for someone with high blood pressure.  Control your stress. Learn ways to manage stress.  Limit alcohol to no more than 1 drink a day for women and 2 drinks a day for men.      Diagnosis: Diabetes mellitus  Assessment and Plan of Treatment: You have diabetes mellitus and Please continue to take metformin 1000 mg and Lantus 40 U at bedtime. These things can also cause low blood sugar:  Missing meals  Not eating enough food  Unplanned or heavy exercise  Taking too much diabetes medicine  Diabetes can cause serious problems over time if you don't get treated. These problems include:  Heart disease  Stroke  Kidney failure  Blindness  Nerve pain  Loss of feeling in the legs and feet  Tissue death (gangrene)  By keeping your blood sugar under control you can prevent or delay these problems.  Normal blood sugar levels are 80mg/dL to 100 mg/dL before a meal. They are less than 180 mg/dL in the 1 to 2 hours after a meal.  Follow these guidelines when caring for yourself at home:  Follow the diet your healthcare provider gives you. Take insulin or other diabetes medicine exactly as told to.  Watch your blood sugar as you are told to. Keep a log of your results. This will help your provider change your medicines to keep your blood sugar under control.  Try to reach your ideal weight. You may be able to cut back on or not have to take diabetes medicine if you eat the right foods and get exercise.  Don't smoke. Smoking worsens the effects of diabetes on your circulation. You are much more likely to have a heart attack if you have diabetes and you smoke. Also don't use e-cigarettes or vaping products.  Take good care of your feet. If you have lost feeling in your feet, you may not notice an injury or infection. Check your feet and between your toes at least once a day. Use a mirror to check the bottoms of your feet.  Wear a medical alert bracelet or necklace. Or carry a card in your wallet that says you have diabetes. This will help healthcare providers give you the right care if you get very ill and can't tell them that you have diabetes.

## 2022-03-14 NOTE — DISCHARGE NOTE PROVIDER - PROVIDER TOKENS
PROVIDER:[TOKEN:[[id number]],FOLLOWUP:[1 week]] PROVIDER:[TOKEN:[[id number]],FOLLOWUP:[1 week]],PROVIDER:[TOKEN:[[id number]],FOLLOWUP:[2 weeks]] PROVIDER:[TOKEN:[[id number]],SCHEDULEDAPPT:[03/21/2022],SCHEDULEDAPPTTIME:[09:15 AM]],PROVIDER:[TOKEN:[[id number]],SCHEDULEDAPPT:[03/28/2022],SCHEDULEDAPPTTIME:[11:45 AM]]

## 2022-03-14 NOTE — PROGRESS NOTE ADULT - PROBLEM SELECTOR PLAN 3
Patient hypertensive on home losartan 50 mg q daily and toprol 50 mg q daily      To do:  - cw hydral 25mg TID  - c/w home toprol 50 mg q daily  - hold home losartan 50 mg q daily given HILARY   - c/w amlodipine 10 mg q daily Patient with HILARY on possible CKD?. Creatinine on prior admission is 1.26-1.36, today 1.55.  - s/p 1L NS     To do:  - hold home losartan   - continue to monitor  - trend BUN/creatinine

## 2022-03-15 LAB
-  CLINDAMYCIN: SIGNIFICANT CHANGE UP
-  ERYTHROMYCIN: SIGNIFICANT CHANGE UP
-  LEVOFLOXACIN: SIGNIFICANT CHANGE UP
-  PENICILLIN: SIGNIFICANT CHANGE UP
-  VANCOMYCIN: SIGNIFICANT CHANGE UP
ANION GAP SERPL CALC-SCNC: 8 MMOL/L — SIGNIFICANT CHANGE UP (ref 5–17)
BUN SERPL-MCNC: 29 MG/DL — HIGH (ref 7–23)
CALCIUM SERPL-MCNC: 8.5 MG/DL — SIGNIFICANT CHANGE UP (ref 8.4–10.5)
CHLORIDE SERPL-SCNC: 101 MMOL/L — SIGNIFICANT CHANGE UP (ref 96–108)
CO2 SERPL-SCNC: 24 MMOL/L — SIGNIFICANT CHANGE UP (ref 22–31)
CREAT SERPL-MCNC: 1.69 MG/DL — HIGH (ref 0.5–1.3)
EGFR: 45 ML/MIN/1.73M2 — LOW
GLUCOSE BLDC GLUCOMTR-MCNC: 146 MG/DL — HIGH (ref 70–99)
GLUCOSE BLDC GLUCOMTR-MCNC: 157 MG/DL — HIGH (ref 70–99)
GLUCOSE BLDC GLUCOMTR-MCNC: 159 MG/DL — HIGH (ref 70–99)
GLUCOSE BLDC GLUCOMTR-MCNC: 84 MG/DL — SIGNIFICANT CHANGE UP (ref 70–99)
GLUCOSE SERPL-MCNC: 170 MG/DL — HIGH (ref 70–99)
HCT VFR BLD CALC: 31.4 % — LOW (ref 39–50)
HGB BLD-MCNC: 10 G/DL — LOW (ref 13–17)
MAGNESIUM SERPL-MCNC: 2 MG/DL — SIGNIFICANT CHANGE UP (ref 1.6–2.6)
MCHC RBC-ENTMCNC: 28.2 PG — SIGNIFICANT CHANGE UP (ref 27–34)
MCHC RBC-ENTMCNC: 31.8 GM/DL — LOW (ref 32–36)
MCV RBC AUTO: 88.7 FL — SIGNIFICANT CHANGE UP (ref 80–100)
METHOD TYPE: SIGNIFICANT CHANGE UP
NRBC # BLD: 0 /100 WBCS — SIGNIFICANT CHANGE UP (ref 0–0)
PHOSPHATE SERPL-MCNC: 2.9 MG/DL — SIGNIFICANT CHANGE UP (ref 2.5–4.5)
PLATELET # BLD AUTO: 333 K/UL — SIGNIFICANT CHANGE UP (ref 150–400)
POTASSIUM SERPL-MCNC: 5.3 MMOL/L — SIGNIFICANT CHANGE UP (ref 3.5–5.3)
POTASSIUM SERPL-SCNC: 5.3 MMOL/L — SIGNIFICANT CHANGE UP (ref 3.5–5.3)
RBC # BLD: 3.54 M/UL — LOW (ref 4.2–5.8)
RBC # FLD: 12.3 % — SIGNIFICANT CHANGE UP (ref 10.3–14.5)
SODIUM SERPL-SCNC: 133 MMOL/L — LOW (ref 135–145)
WBC # BLD: 13.56 K/UL — HIGH (ref 3.8–10.5)
WBC # FLD AUTO: 13.56 K/UL — HIGH (ref 3.8–10.5)

## 2022-03-15 PROCEDURE — 99232 SBSQ HOSP IP/OBS MODERATE 35: CPT | Mod: GC

## 2022-03-15 PROCEDURE — 36573 INSJ PICC RS&I 5 YR+: CPT

## 2022-03-15 PROCEDURE — 76937 US GUIDE VASCULAR ACCESS: CPT | Mod: 26,59

## 2022-03-15 PROCEDURE — 99233 SBSQ HOSP IP/OBS HIGH 50: CPT

## 2022-03-15 RX ORDER — CHLORHEXIDINE GLUCONATE 213 G/1000ML
1 SOLUTION TOPICAL
Refills: 0 | Status: DISCONTINUED | OUTPATIENT
Start: 2022-03-15 | End: 2022-03-16

## 2022-03-15 RX ORDER — POLYETHYLENE GLYCOL 3350 17 G/17G
17 POWDER, FOR SOLUTION ORAL
Qty: 510 | Refills: 0
Start: 2022-03-15 | End: 2022-04-13

## 2022-03-15 RX ORDER — METOPROLOL TARTRATE 50 MG
1 TABLET ORAL
Qty: 0 | Refills: 0 | DISCHARGE

## 2022-03-15 RX ORDER — SODIUM ZIRCONIUM CYCLOSILICATE 10 G/10G
10 POWDER, FOR SUSPENSION ORAL ONCE
Refills: 0 | Status: COMPLETED | OUTPATIENT
Start: 2022-03-15 | End: 2022-03-15

## 2022-03-15 RX ORDER — METOPROLOL TARTRATE 50 MG
1 TABLET ORAL
Qty: 30 | Refills: 0
Start: 2022-03-15 | End: 2022-04-13

## 2022-03-15 RX ORDER — GABAPENTIN 400 MG/1
1 CAPSULE ORAL
Qty: 90 | Refills: 0
Start: 2022-03-15 | End: 2022-04-13

## 2022-03-15 RX ORDER — HYDRALAZINE HCL 50 MG
1 TABLET ORAL
Qty: 90 | Refills: 0
Start: 2022-03-15 | End: 2022-04-13

## 2022-03-15 RX ORDER — SENNA PLUS 8.6 MG/1
2 TABLET ORAL
Qty: 60 | Refills: 0
Start: 2022-03-15 | End: 2022-04-13

## 2022-03-15 RX ORDER — SODIUM CHLORIDE 9 MG/ML
10 INJECTION INTRAMUSCULAR; INTRAVENOUS; SUBCUTANEOUS
Refills: 0 | Status: DISCONTINUED | OUTPATIENT
Start: 2022-03-15 | End: 2022-03-16

## 2022-03-15 RX ORDER — CEFTRIAXONE 500 MG/1
2000 INJECTION, POWDER, FOR SOLUTION INTRAMUSCULAR; INTRAVENOUS EVERY 24 HOURS
Refills: 0 | Status: DISCONTINUED | OUTPATIENT
Start: 2022-03-15 | End: 2022-03-16

## 2022-03-15 RX ORDER — AMLODIPINE BESYLATE 2.5 MG/1
1 TABLET ORAL
Qty: 30 | Refills: 0
Start: 2022-03-15 | End: 2022-04-13

## 2022-03-15 RX ORDER — CEFTRIAXONE 500 MG/1
2 INJECTION, POWDER, FOR SOLUTION INTRAMUSCULAR; INTRAVENOUS
Qty: 0 | Refills: 0 | DISCHARGE
Start: 2022-03-15

## 2022-03-15 RX ORDER — INSULIN LISPRO 100/ML
0 VIAL (ML) SUBCUTANEOUS
Qty: 0 | Refills: 0 | DISCHARGE
Start: 2022-03-15

## 2022-03-15 RX ADMIN — SENNA PLUS 2 TABLET(S): 8.6 TABLET ORAL at 22:03

## 2022-03-15 RX ADMIN — Medication 50 MILLIGRAM(S): at 06:32

## 2022-03-15 RX ADMIN — POLYETHYLENE GLYCOL 3350 17 GRAM(S): 17 POWDER, FOR SOLUTION ORAL at 10:43

## 2022-03-15 RX ADMIN — GABAPENTIN 300 MILLIGRAM(S): 400 CAPSULE ORAL at 09:30

## 2022-03-15 RX ADMIN — SODIUM ZIRCONIUM CYCLOSILICATE 10 GRAM(S): 10 POWDER, FOR SUSPENSION ORAL at 12:36

## 2022-03-15 RX ADMIN — CHLORHEXIDINE GLUCONATE 1 APPLICATION(S): 213 SOLUTION TOPICAL at 17:24

## 2022-03-15 RX ADMIN — Medication 25 MILLIGRAM(S): at 19:44

## 2022-03-15 RX ADMIN — CLOPIDOGREL BISULFATE 75 MILLIGRAM(S): 75 TABLET, FILM COATED ORAL at 10:43

## 2022-03-15 RX ADMIN — Medication 25 MILLIGRAM(S): at 03:56

## 2022-03-15 RX ADMIN — Medication 25 MILLIGRAM(S): at 10:43

## 2022-03-15 RX ADMIN — HEPARIN SODIUM 5000 UNIT(S): 5000 INJECTION INTRAVENOUS; SUBCUTANEOUS at 06:32

## 2022-03-15 RX ADMIN — HEPARIN SODIUM 5000 UNIT(S): 5000 INJECTION INTRAVENOUS; SUBCUTANEOUS at 22:03

## 2022-03-15 RX ADMIN — Medication 81 MILLIGRAM(S): at 10:42

## 2022-03-15 RX ADMIN — HEPARIN SODIUM 5000 UNIT(S): 5000 INJECTION INTRAVENOUS; SUBCUTANEOUS at 13:31

## 2022-03-15 RX ADMIN — ATORVASTATIN CALCIUM 80 MILLIGRAM(S): 80 TABLET, FILM COATED ORAL at 22:03

## 2022-03-15 RX ADMIN — GABAPENTIN 300 MILLIGRAM(S): 400 CAPSULE ORAL at 17:19

## 2022-03-15 RX ADMIN — AMLODIPINE BESYLATE 10 MILLIGRAM(S): 2.5 TABLET ORAL at 17:19

## 2022-03-15 RX ADMIN — CEFTRIAXONE 100 MILLIGRAM(S): 500 INJECTION, POWDER, FOR SOLUTION INTRAMUSCULAR; INTRAVENOUS at 17:31

## 2022-03-15 RX ADMIN — INSULIN GLARGINE 32 UNIT(S): 100 INJECTION, SOLUTION SUBCUTANEOUS at 22:03

## 2022-03-15 RX ADMIN — GABAPENTIN 300 MILLIGRAM(S): 400 CAPSULE ORAL at 01:20

## 2022-03-15 NOTE — PROGRESS NOTE ADULT - SUBJECTIVE AND OBJECTIVE BOX
INTERVAL HPI/OVERNIGHT EVENTS: TANVI    SUBJECTIVE: Patient seen and examined at bedside. Patient denies fever, chills, HA, nausea, vomiting, abdominal pain, dysuria, diarrhea.      ANTIBIOTICS/RELEVANT:    MEDICATIONS  (STANDING):  amLODIPine   Tablet 10 milliGRAM(s) Oral every 24 hours  aspirin enteric coated 81 milliGRAM(s) Oral daily  atorvastatin 80 milliGRAM(s) Oral at bedtime  cefTRIAXone   IVPB 2000 milliGRAM(s) IV Intermittent every 24 hours  clopidogrel Tablet 75 milliGRAM(s) Oral daily  dextrose 40% Gel 15 Gram(s) Oral once  dextrose 5%. 1000 milliLiter(s) (50 mL/Hr) IV Continuous <Continuous>  dextrose 5%. 1000 milliLiter(s) (100 mL/Hr) IV Continuous <Continuous>  dextrose 50% Injectable 25 Gram(s) IV Push once  dextrose 50% Injectable 12.5 Gram(s) IV Push once  dextrose 50% Injectable 25 Gram(s) IV Push once  gabapentin 300 milliGRAM(s) Oral every 8 hours  glucagon  Injectable 1 milliGRAM(s) IntraMuscular once  heparin   Injectable 5000 Unit(s) SubCutaneous every 8 hours  hydrALAZINE 25 milliGRAM(s) Oral every 8 hours  insulin glargine Injectable (LANTUS) 32 Unit(s) SubCutaneous at bedtime  insulin lispro (ADMELOG) corrective regimen sliding scale   SubCutaneous Before meals and at bedtime  metoprolol succinate ER 50 milliGRAM(s) Oral daily  polyethylene glycol 3350 17 Gram(s) Oral daily  senna 2 Tablet(s) Oral at bedtime    MEDICATIONS  (PRN):  acetaminophen     Tablet .. 650 milliGRAM(s) Oral every 6 hours PRN Mild Pain (1 - 3)  traMADol 25 milliGRAM(s) Oral every 6 hours PRN Severe Pain (7 - 10)        Vital Signs Last 24 Hrs  T(C): 37.7 (15 Mar 2022 10:36), Max: 37.7 (15 Mar 2022 10:36)  T(F): 99.8 (15 Mar 2022 10:36), Max: 99.8 (15 Mar 2022 10:36)  HR: 77 (15 Mar 2022 10:36) (68 - 77)  BP: 143/74 (15 Mar 2022 10:36) (122/68 - 177/71)  BP(mean): --  RR: 18 (15 Mar 2022 10:36) (17 - 18)  SpO2: 98% (15 Mar 2022 10:36) (96% - 98%)    PHYSICAL EXAM:  General: NAD  Eyes: EOMI  Ear/Nose/Throat: no oral lesion	  Neck: supple  Cardiovascular: +S1/S2; RRR  Respiratory: CTA B/L; no W/R/R  Gastrointestinal: soft, NT/ND  Extremities: WWP; RLE with ACE wrap around R foot  Vascular: 2+ radial pulses B/L  Neurological: AAOx3; no focal deficits  Skin: no rashes      LABS:                        10.0   13.56 )-----------( 333      ( 15 Mar 2022 10:48 )             31.4     03-15    x   |  101  |  x   ----------------------------<  170<H>  5.3   |  24  |  x     Ca    8.5      15 Mar 2022 10:48  Phos  2.9     03-15  Mg     2.0     03-15        Urinalysis Basic - ( 14 Mar 2022 09:06 )    Color: Yellow / Appearance: Clear / SG: >=1.030 / pH: x  Gluc: x / Ketone: NEGATIVE  / Bili: Negative / Urobili: 1.0 E.U./dL   Blood: x / Protein: >=300 mg/dL / Nitrite: NEGATIVE   Leuk Esterase: NEGATIVE / RBC: < 5 /HPF / WBC < 5 /HPF   Sq Epi: x / Non Sq Epi: 0-5 /HPF / Bacteria: None /HPF        MICROBIOLOGY:    RADIOLOGY & ADDITIONAL STUDIES:

## 2022-03-15 NOTE — PROGRESS NOTE ADULT - ASSESSMENT
IMPRESSION:  Osteomyelitis of the right first toe s/p amputation.  Cultures with Group B strep    Recommendations:  - if pt has residual infection per podiatry, then pt will need ceftriaxone 2 grams IV daily for 6 weeks with the first day being the day of the amputation (3/13)  - if pt has no residual infection per podiatry, then okay to d/c antibiotics  - if unclear whether or not there is residual infection, then would continue antibiotics as pt would be at high risk of worsening infection if left untreated  - Follow up wound cultures    ID team 1 will sign off, please reconsult with any questions    Note not finalized until attested by attending  IMPRESSION:  Osteomyelitis of the right first toe s/p amputation.  Cultures with Group B strep    Recommendations:  - if pt has residual infection per podiatry, then pt will need ceftriaxone 2 grams IV daily for 6 weeks with the first day being the day of the amputation (3/13)  - if pt has no residual infection per podiatry, then okay to d/c antibiotics  - if unclear whether or not there is residual infection, then would continue antibiotics as pt would be at high risk of worsening infection if left untreated  - Follow up wound cultures  - pt will need weekly labs sent to Dr. Rowley's office: ESR, CRP, CBC, CMP  - Dr. Rowley's office will reach out to patient to schedule appointment in 2-3 weeks    ID team 1 will sign off, please reconsult with any questions    Note not finalized until attested by attending  IMPRESSION:  Osteomyelitis of the right first toe s/p amputation.  Cultures with Group B strep    Recommendations:  - if pt has residual infection per podiatry, then pt will need ceftriaxone 2 grams IV daily for 6 weeks with the first day being the day of the amputation (3/13)  - if pt has no residual infection per podiatry, then okay to d/c antibiotics  - if unclear whether or not there is residual infection, then would continue antibiotics as pt would be at high risk of worsening infection if left untreated  - Follow up wound cultures  - pt will need weekly labs sent to Dr. Rowley's office (Fax: [phone]): ESR, CRP, CBC, CMP  - Dr. Rowley's office will reach out to patient to schedule appointment in 2-3 weeks    ID team 1 will sign off, please reconsult with any questions    Note not finalized until attested by attending

## 2022-03-15 NOTE — CONSULT NOTE ADULT - SUBJECTIVE AND OBJECTIVE BOX
Vascular Access Service Consult Note    [id number]M [facility]:         Diagnosis: diabetic foot infection    Indications for Vascular Access (Check all that apply)  [ x ]  Antibiotic Therapy       Antibiotic Prescribed:  ceftriaxone until 4/25                                                                                   [  ]  IV Hydration  [  ]  Total Parenteral Nutrition  [  ]  Chemotherapy  [  ]  Difficult Venous Access  [  ]  CVP monitoring  [  ]  Medications with high potential for tissue necrosis on extravasation  [  ]  Other    Screening (Check all that apply)  Previous Radiation to chest  [  ] Yes      [x  ]  No  Breast Cancer                          [  ] Left     [  ]  Right    [ x ]  No  Lymph Node Dissection         [  ] Left     [  ]  Right    [x  ]  No  Pacemaker or ICD                   [  ] Left     [  ]  Right    [ x ]  No  Upper Extremity DVT             [  ] Left     [  ]  Right    [x  ]  No  Chronic Kidney Disease         [  ]  Yes     [x  ]  No  Hemodialysis                           [  ]  Yes     [ x]  No  AV Fistula/ Graft                     [  ]  Left    [  ]  Right    [ x ]  No  Temp>101F in past 24 H       [  ]  Yes     [ x ]  No  H/O PICC/Midline                   [  ]  Yes     [x  ]  No    Lab data:                        10.0   13.56 )-----------( 333      ( 15 Mar 2022 10:48 )             31.4     03-15    133<L>  |  101  |  29<H>  ----------------------------<  170<H>  5.3   |  24  |  1.69<H>    Ca    8.5      15 Mar 2022 10:48  Phos  2.9     03-15  Mg     2.0     03-15                I have reviewed the chart, interviewed and examined the patient and determined that this patient:  [x  ] Is a candidate for a PICC line  [  ] Is a candidate for a Midline  [  ] Is not a candidate for vascular access device (reason)    Lumens:    [ x ] Single  [  ] Double

## 2022-03-15 NOTE — PROGRESS NOTE ADULT - PROBLEM SELECTOR PLAN 1
Patient presenting with worsening discoloration and swelling of toe. Patient with known discoloration of toe since 11/2021. PMH of diabetes and PVD. XR foot wet read negative for soft tissue emphysema or radiographic signs of OM.  Patient states that he had stent placed at [facility] in R leg on 3/7/22 which he thought would improve toe, however had increasing "blackness" of toe from tip of toe down to base  - X ray rt foot: Soft tissue swelling surrounding the great toe.  - deep wound culture from rt hallux positive for Strep agalactiae    S/P right hallux amputation at level of 1st MPJ partially closed and packed.    To do:  - F/U OR deep wound culture, dirty margin and clean margin   - c/w ceftriaxone IV for Strep Agalactiae x 6 weeks  - PICC today
Patient presenting with worsening discoloration and swelling of toe. Patient with known discoloration of toe since 11/2021. PMH of diabetes and PVD. XR foot wet read negative for soft tissue emphysema or radiographic signs of OM.     - podiatry consulted, appreciate recs - possible OR  - f/u official XR read  - f/u BCx  - c/w zosyn and vancomycin IV q daily  - vascular consulted was able to biphasic pulses
Patient presenting with worsening discoloration and swelling of toe. Patient with known discoloration of toe since 11/2021. PMH of diabetes and PVD. XR foot wet read negative for soft tissue emphysema or radiographic signs of OM.  Patient states that he had stent placed at [facility] in R leg on 3/7/22 which he thought would improve toe, however had increasing "blackness" of toe from tip of toe down to base   S/P right hallux amputation at level of 1st MPJ partially closed and packed.    To do:  - F/U OR deep wound culture, dirty margin and clean margin   - c/w ceftriaxone IV for Strep Agalactiae   - f/u Podiatry recs and ID rec
Patient presenting with worsening discoloration and swelling of toe. Patient with known discoloration of toe since 11/2021. PMH of diabetes and PVD. XR foot wet read negative for soft tissue emphysema or radiographic signs of OM.  Patient states that he had stent placed at [facility] in R leg on 3/7/22 which he thought would improve toe, however had increasing "blackness" of toe from tip of toe down to base   S/P right hallux amputation at level of 1st MPJ partially closed and packed.    To do:  - F/U OR deep wound culture, dirty margin and clean margin   - c/w zosyn and vancomycin IV q daily  - f/u Podiatry recs

## 2022-03-15 NOTE — PROGRESS NOTE ADULT - ATTENDING COMMENTS
Agree with above.  No pathology was sent from OR.  If Group B strep represents residual infection would recommend Ceftriaxone 2 grams IV daily x 6 weeks. If all the infection was removed, we can stop now.  If it's unclear based on current information, I would err on the side of caution and treat for 6 weeks as Group B strep can cause bacteremia, especially in a diabetic

## 2022-03-15 NOTE — PROGRESS NOTE ADULT - PROBLEM SELECTOR PLAN 7
Patient with normocytic anemia hgb of 11.7-->10.7. Trending laterally since last admission. No sign of obvious bleed.     To do:  - continue to monitor  - transfuse Hgb <8 given cardiac history  - maintain active T&S

## 2022-03-15 NOTE — PROGRESS NOTE ADULT - PROBLEM SELECTOR PLAN 4
Patient hypertensive on home losartan 50 mg q daily and toprol 50 mg q daily    To do:  - cw hydral 25mg TID  - c/w home toprol 50 mg q daily  - hold home losartan 50 mg q daily given HILARY   - c/w amlodipine 10 mg q daily

## 2022-03-15 NOTE — PROGRESS NOTE ADULT - PROBLEM SELECTOR PLAN 10
F: none  E: replete to K>4, Mg>2, Phos>2.5  N: diabetic diet  Ppx: aspirin 81   Code Status: FULL  Dispo: [facility]
F: per PO   E: replete to K>4, Mg>2, Phos>2.5  N: diabetic diet  Ppx: aspirin 81   Code Status: FULL  Dispo: [facility]

## 2022-03-15 NOTE — PROGRESS NOTE ADULT - PROBLEM SELECTOR PLAN 8
Patient with PAD/PVD s/p angio/stent 4 days ago at [facility] and history of thrombectomy.   Patient states that he had stent placed at [facility] in R leg on 3/7/22 which he thought would improve toe, however had increasing "blackness" of toe from tip of toe down to base  - vascular consulted - no acute interventions    To do:  - c/w atorvastatin 80 mg q daily  - c/w aspirin 81 mg q daily  - c/w clopidogrel 75 mg q daily

## 2022-03-15 NOTE — PROGRESS NOTE ADULT - SUBJECTIVE AND OBJECTIVE BOX
Patient is a 62y old  Male who presents with a chief complaint of foot gangrene      INTERVAL HPI/ OVERNIGHT EVENTS: Pt evaluated at bedside with chief resident. POD#2 right hallux amputation. No new pedal complaints at this time. Dressing site was C/D/I.       MICROBIOLOGY  Deep wound swab growing GBS      PHYSICAL EXAM  General: NAD, AA0x3  Lower Extremity Focused:  Vasc: non palpable pulses b/l 2/2 edema. Monophasic signals to DP &PT b/l. Erythema present to right hallux.   Derm: Necrotic tissue present to right distal hallux, erythematous borders with fibrotic edges. Negative for purulence, negative for probing or undermining. Positive for malodor. Hallux nail is partially detached at distal nail border  3/13 - pt is now s/p right hallux amputation with stitches intact and partial opening present. Sanguinous drainage. Negative for malodor, proximal streaking, or other signs of infection at this time.  Neuro: Protective sensation diminished  MSK: Pt denies tenderness to right hallux. MMT reveals 5/5 strength in all compartments.

## 2022-03-15 NOTE — PROGRESS NOTE ADULT - PROBLEM SELECTOR PLAN 3
Patient with HILARY on possible CKD?. Creatinine on prior admission is 1.26-1.36, today 1.69.  - s/p 1L NS     To do:  - hold home losartan   - continue to monitor  - trend BUN/creatinine

## 2022-03-15 NOTE — PROGRESS NOTE ADULT - ASSESSMENT
61 yo male with a hx of DM, PVD s/p angio/stent 4 days ago (at [facility]) p/w R big toe discoloration and swelling.  Podiatry consulted to evaluate for wet gangrene. Of note, pt is afebrile, VSS, with  WBC dowtrending. Clinical image of right hallux highly suspicious of wet gangrene. Pt now S/P right hallux amputation at level of 1st MPJ partially closed and packed (DOS 3/13/22).    Plan:  - Cont ABX per primary   - F/U OR deep wound culture, dirty margin and clean margin - deep wound swab growing GBS  - recc heel WBAT to RLE  - Local wound care: surgical site irrigated, dressed DSD, Kerlix and ACE  - rest of care per primary team    Podiatry following.

## 2022-03-15 NOTE — PROGRESS NOTE ADULT - SUBJECTIVE AND OBJECTIVE BOX
OVERNIGHT EVENTS:    SUBJECTIVE / INTERVAL HPI: Patient seen and examined at bedside.     VITAL SIGNS:  Vital Signs Last 24 Hrs  T(C): 37.2 (15 Mar 2022 05:05), Max: 37.5 (14 Mar 2022 10:11)  T(F): 99 (15 Mar 2022 05:05), Max: 99.5 (14 Mar 2022 10:11)  HR: 73 (15 Mar 2022 05:05) (68 - 73)  BP: 122/68 (15 Mar 2022 05:05) (122/68 - 177/71)  BP(mean): --  RR: 17 (15 Mar 2022 05:05) (16 - 17)  SpO2: 97% (15 Mar 2022 05:05) (96% - 98%)    PHYSICAL EXAM:    General: WDWN  HEENT: NC/AT; PERRL, anicteric sclera; MMM  Neck: supple  Cardiovascular: +S1/S2, RRR  Respiratory: CTA B/L; no W/R/R  Gastrointestinal: soft, NT/ND; +BSx4  Extremities: WWP; no edema, clubbing or cyanosis  Vascular: 2+ radial, DP/PT pulses B/L  Neurological: AAOx3; no focal deficits    MEDICATIONS:  MEDICATIONS  (STANDING):  amLODIPine   Tablet 10 milliGRAM(s) Oral every 24 hours  aspirin enteric coated 81 milliGRAM(s) Oral daily  atorvastatin 80 milliGRAM(s) Oral at bedtime  cefTRIAXone   IVPB 2000 milliGRAM(s) IV Intermittent every 24 hours  clopidogrel Tablet 75 milliGRAM(s) Oral daily  dextrose 40% Gel 15 Gram(s) Oral once  dextrose 5%. 1000 milliLiter(s) (50 mL/Hr) IV Continuous <Continuous>  dextrose 5%. 1000 milliLiter(s) (100 mL/Hr) IV Continuous <Continuous>  dextrose 50% Injectable 25 Gram(s) IV Push once  dextrose 50% Injectable 12.5 Gram(s) IV Push once  dextrose 50% Injectable 25 Gram(s) IV Push once  gabapentin 300 milliGRAM(s) Oral every 8 hours  glucagon  Injectable 1 milliGRAM(s) IntraMuscular once  heparin   Injectable 5000 Unit(s) SubCutaneous every 8 hours  hydrALAZINE 25 milliGRAM(s) Oral every 8 hours  insulin glargine Injectable (LANTUS) 32 Unit(s) SubCutaneous at bedtime  insulin lispro (ADMELOG) corrective regimen sliding scale   SubCutaneous Before meals and at bedtime  metoprolol succinate ER 50 milliGRAM(s) Oral daily  polyethylene glycol 3350 17 Gram(s) Oral daily  senna 2 Tablet(s) Oral at bedtime    MEDICATIONS  (PRN):  acetaminophen     Tablet .. 650 milliGRAM(s) Oral every 6 hours PRN Mild Pain (1 - 3)  traMADol 25 milliGRAM(s) Oral every 6 hours PRN Severe Pain (7 - 10)      ALLERGIES:  Allergies    iodinated radiocontrast agents (Hives)    Intolerances        LABS:                        10.7   11.58 )-----------( 359      ( 14 Mar 2022 07:17 )             33.7     03-14    137  |  105  |  28<H>  ----------------------------<  123<H>  5.1   |  22  |  1.68<H>    Ca    8.9      14 Mar 2022 07:17  Phos  3.7     03-14  Mg     1.9     03-14        Urinalysis Basic - ( 14 Mar 2022 09:06 )    Color: Yellow / Appearance: Clear / SG: >=1.030 / pH: x  Gluc: x / Ketone: NEGATIVE  / Bili: Negative / Urobili: 1.0 E.U./dL   Blood: x / Protein: >=300 mg/dL / Nitrite: NEGATIVE   Leuk Esterase: NEGATIVE / RBC: < 5 /HPF / WBC < 5 /HPF   Sq Epi: x / Non Sq Epi: 0-5 /HPF / Bacteria: None /HPF      CAPILLARY BLOOD GLUCOSE      POCT Blood Glucose.: 84 mg/dL (15 Mar 2022 08:51)      RADIOLOGY & ADDITIONAL TESTS: Reviewed. OVERNIGHT EVENTS: No acute events overnight    SUBJECTIVE / INTERVAL HPI: Patient seen and examined at bedside. He was resting comfortably and did not appear in acute distress. He mentions that he has on and off right foot pain which is not severe anymore. He denied any headache, vision disturbance, SOB, abd pain, N/V/C/D.    VITAL SIGNS:  Vital Signs Last 24 Hrs  T(C): 37.2 (15 Mar 2022 05:05), Max: 37.5 (14 Mar 2022 10:11)  T(F): 99 (15 Mar 2022 05:05), Max: 99.5 (14 Mar 2022 10:11)  HR: 73 (15 Mar 2022 05:05) (68 - 73)  BP: 122/68 (15 Mar 2022 05:05) (122/68 - 177/71)  BP(mean): --  RR: 17 (15 Mar 2022 05:05) (16 - 17)  SpO2: 97% (15 Mar 2022 05:05) (96% - 98%)    PHYSICAL EXAM:  General: WDWN  HEENT: NC/AT; PERRL, anicteric sclera; MMM  Neck: supple  Cardiovascular: +S1/S2, RRR  Respiratory: CTA B/L; no W/R/R  Gastrointestinal: soft, NT/ND; +BSx4  Extremities: WWP; no edema, clubbing or cyanosis, rt covered with dressing without any sign of bleeding   Vascular: 2+ radial, DP/PT pulses B/L  Neurological: AAOx3; no focal deficits    MEDICATIONS:  MEDICATIONS  (STANDING):  amLODIPine   Tablet 10 milliGRAM(s) Oral every 24 hours  aspirin enteric coated 81 milliGRAM(s) Oral daily  atorvastatin 80 milliGRAM(s) Oral at bedtime  cefTRIAXone   IVPB 2000 milliGRAM(s) IV Intermittent every 24 hours  clopidogrel Tablet 75 milliGRAM(s) Oral daily  dextrose 40% Gel 15 Gram(s) Oral once  dextrose 5%. 1000 milliLiter(s) (50 mL/Hr) IV Continuous <Continuous>  dextrose 5%. 1000 milliLiter(s) (100 mL/Hr) IV Continuous <Continuous>  dextrose 50% Injectable 25 Gram(s) IV Push once  dextrose 50% Injectable 12.5 Gram(s) IV Push once  dextrose 50% Injectable 25 Gram(s) IV Push once  gabapentin 300 milliGRAM(s) Oral every 8 hours  glucagon  Injectable 1 milliGRAM(s) IntraMuscular once  heparin   Injectable 5000 Unit(s) SubCutaneous every 8 hours  hydrALAZINE 25 milliGRAM(s) Oral every 8 hours  insulin glargine Injectable (LANTUS) 32 Unit(s) SubCutaneous at bedtime  insulin lispro (ADMELOG) corrective regimen sliding scale   SubCutaneous Before meals and at bedtime  metoprolol succinate ER 50 milliGRAM(s) Oral daily  polyethylene glycol 3350 17 Gram(s) Oral daily  senna 2 Tablet(s) Oral at bedtime    MEDICATIONS  (PRN):  acetaminophen     Tablet .. 650 milliGRAM(s) Oral every 6 hours PRN Mild Pain (1 - 3)  traMADol 25 milliGRAM(s) Oral every 6 hours PRN Severe Pain (7 - 10)      ALLERGIES:  Allergies    iodinated radiocontrast agents (Hives)    Intolerances        LABS:                        10.7   11.58 )-----------( 359      ( 14 Mar 2022 07:17 )             33.7     03-14    137  |  105  |  28<H>  ----------------------------<  123<H>  5.1   |  22  |  1.68<H>    Ca    8.9      14 Mar 2022 07:17  Phos  3.7     03-14  Mg     1.9     03-14        Urinalysis Basic - ( 14 Mar 2022 09:06 )    Color: Yellow / Appearance: Clear / SG: >=1.030 / pH: x  Gluc: x / Ketone: NEGATIVE  / Bili: Negative / Urobili: 1.0 E.U./dL   Blood: x / Protein: >=300 mg/dL / Nitrite: NEGATIVE   Leuk Esterase: NEGATIVE / RBC: < 5 /HPF / WBC < 5 /HPF   Sq Epi: x / Non Sq Epi: 0-5 /HPF / Bacteria: None /HPF      CAPILLARY BLOOD GLUCOSE      POCT Blood Glucose.: 84 mg/dL (15 Mar 2022 08:51)      RADIOLOGY & ADDITIONAL TESTS: Reviewed.

## 2022-03-15 NOTE — PROGRESS NOTE ADULT - PROBLEM SELECTOR PLAN 2
Patient presenting with worsening discoloration and swelling of toe. Patient with known discoloration of toe since 11/2021. PMH of diabetes and PVD.   Patient states that he had stent placed at [facility] in R leg on 3/7/22 which he thought would improve toe, however had increasing "blackness" of toe from tip of toe down to base   S/P right hallux amputation at level of 1st MPJ partially closed and packed.  - X ray rt foot: Soft tissue swelling surrounding the great toe.    To do:  - F/U OR deep wound culture, dirty margin and clean margin   - c/w ceftriaxone IV for Strep Agalactiae   - f/u Podiatry recs and ID rec

## 2022-03-15 NOTE — PROGRESS NOTE ADULT - PROBLEM SELECTOR PLAN 9
Patient with T2DM on metformin 1000 mg and Lantus 40 U at bedtime at home. Presenting with hyperglycemia likely in the setting of infection/inflammation vs missed Lantus/metformin doses over past few days. FSBG under control.    To do:  - c/w Lantus 32 U at bedtime (80% of home dose)   - readjust dosing as needed based on mISS requirements tomorrow.   - mISS   - f/u A1c

## 2022-03-15 NOTE — PROGRESS NOTE ADULT - ATTENDING COMMENTS
61 yo male with a hx of HTN, DM (on insulin and metformin), PVD s/p angio/stent 4 days ago, known CAD s/p 3vCABG, multiple PCIs with RAJENDRA, PAD w h/o acute limb ischemia w/thrombectomy p/w R big toe discoloration and swelling.     Patient was seen and examined today, pt was lying in bed comfortably. Denied any complaints.   labs, vitals and charts reviewed.     #Osteomyelitis of the right first toe s/p amputation.   Will continue Ceftriaxone 2 grams IV daily x 6 weeks.   PICC line was placed by IR.   Discharge planning.

## 2022-03-16 ENCOUNTER — TRANSCRIPTION ENCOUNTER (OUTPATIENT)
Age: 62
End: 2022-03-16

## 2022-03-16 VITALS
RESPIRATION RATE: 16 BRPM | OXYGEN SATURATION: 97 % | TEMPERATURE: 98 F | SYSTOLIC BLOOD PRESSURE: 156 MMHG | HEART RATE: 84 BPM | DIASTOLIC BLOOD PRESSURE: 54 MMHG

## 2022-03-16 LAB
ANION GAP SERPL CALC-SCNC: 10 MMOL/L — SIGNIFICANT CHANGE UP (ref 5–17)
BUN SERPL-MCNC: 30 MG/DL — HIGH (ref 7–23)
CALCIUM SERPL-MCNC: 8.7 MG/DL — SIGNIFICANT CHANGE UP (ref 8.4–10.5)
CHLORIDE SERPL-SCNC: 104 MMOL/L — SIGNIFICANT CHANGE UP (ref 96–108)
CO2 SERPL-SCNC: 21 MMOL/L — LOW (ref 22–31)
CREAT SERPL-MCNC: 1.67 MG/DL — HIGH (ref 0.5–1.3)
CULTURE RESULTS: SIGNIFICANT CHANGE UP
CULTURE RESULTS: SIGNIFICANT CHANGE UP
EGFR: 46 ML/MIN/1.73M2 — LOW
GLUCOSE BLDC GLUCOMTR-MCNC: 226 MG/DL — HIGH (ref 70–99)
GLUCOSE BLDC GLUCOMTR-MCNC: 70 MG/DL — SIGNIFICANT CHANGE UP (ref 70–99)
GLUCOSE SERPL-MCNC: 69 MG/DL — LOW (ref 70–99)
HCT VFR BLD CALC: 29 % — LOW (ref 39–50)
HGB BLD-MCNC: 9.4 G/DL — LOW (ref 13–17)
MAGNESIUM SERPL-MCNC: 2.2 MG/DL — SIGNIFICANT CHANGE UP (ref 1.6–2.6)
MCHC RBC-ENTMCNC: 28.5 PG — SIGNIFICANT CHANGE UP (ref 27–34)
MCHC RBC-ENTMCNC: 32.4 GM/DL — SIGNIFICANT CHANGE UP (ref 32–36)
MCV RBC AUTO: 87.9 FL — SIGNIFICANT CHANGE UP (ref 80–100)
NRBC # BLD: 0 /100 WBCS — SIGNIFICANT CHANGE UP (ref 0–0)
PHOSPHATE SERPL-MCNC: 4.5 MG/DL — SIGNIFICANT CHANGE UP (ref 2.5–4.5)
PLATELET # BLD AUTO: 297 K/UL — SIGNIFICANT CHANGE UP (ref 150–400)
POTASSIUM SERPL-MCNC: 4.7 MMOL/L — SIGNIFICANT CHANGE UP (ref 3.5–5.3)
POTASSIUM SERPL-SCNC: 4.7 MMOL/L — SIGNIFICANT CHANGE UP (ref 3.5–5.3)
RBC # BLD: 3.3 M/UL — LOW (ref 4.2–5.8)
RBC # FLD: 12.3 % — SIGNIFICANT CHANGE UP (ref 10.3–14.5)
SODIUM SERPL-SCNC: 135 MMOL/L — SIGNIFICANT CHANGE UP (ref 135–145)
SPECIMEN SOURCE: SIGNIFICANT CHANGE UP
SPECIMEN SOURCE: SIGNIFICANT CHANGE UP
WBC # BLD: 11.98 K/UL — HIGH (ref 3.8–10.5)
WBC # FLD AUTO: 11.98 K/UL — HIGH (ref 3.8–10.5)

## 2022-03-16 PROCEDURE — 73630 X-RAY EXAM OF FOOT: CPT

## 2022-03-16 PROCEDURE — 36573 INSJ PICC RS&I 5 YR+: CPT

## 2022-03-16 PROCEDURE — 85027 COMPLETE CBC AUTOMATED: CPT

## 2022-03-16 PROCEDURE — 86140 C-REACTIVE PROTEIN: CPT

## 2022-03-16 PROCEDURE — 87070 CULTURE OTHR SPECIMN AEROBIC: CPT

## 2022-03-16 PROCEDURE — 87184 SC STD DISK METHOD PER PLATE: CPT

## 2022-03-16 PROCEDURE — 83540 ASSAY OF IRON: CPT

## 2022-03-16 PROCEDURE — 83036 HEMOGLOBIN GLYCOSYLATED A1C: CPT

## 2022-03-16 PROCEDURE — 84100 ASSAY OF PHOSPHORUS: CPT

## 2022-03-16 PROCEDURE — 87015 SPECIMEN INFECT AGNT CONCNTJ: CPT

## 2022-03-16 PROCEDURE — 86901 BLOOD TYPING SEROLOGIC RH(D): CPT

## 2022-03-16 PROCEDURE — 73660 X-RAY EXAM OF TOE(S): CPT

## 2022-03-16 PROCEDURE — 85025 COMPLETE CBC W/AUTO DIFF WBC: CPT

## 2022-03-16 PROCEDURE — 80048 BASIC METABOLIC PNL TOTAL CA: CPT

## 2022-03-16 PROCEDURE — 82803 BLOOD GASES ANY COMBINATION: CPT

## 2022-03-16 PROCEDURE — 86900 BLOOD TYPING SEROLOGIC ABO: CPT

## 2022-03-16 PROCEDURE — 36415 COLL VENOUS BLD VENIPUNCTURE: CPT

## 2022-03-16 PROCEDURE — 87075 CULTR BACTERIA EXCEPT BLOOD: CPT

## 2022-03-16 PROCEDURE — 88311 DECALCIFY TISSUE: CPT

## 2022-03-16 PROCEDURE — 86850 RBC ANTIBODY SCREEN: CPT

## 2022-03-16 PROCEDURE — 71046 X-RAY EXAM CHEST 2 VIEWS: CPT

## 2022-03-16 PROCEDURE — 83735 ASSAY OF MAGNESIUM: CPT

## 2022-03-16 PROCEDURE — 99239 HOSP IP/OBS DSCHRG MGMT >30: CPT | Mod: GC

## 2022-03-16 PROCEDURE — 97116 GAIT TRAINING THERAPY: CPT

## 2022-03-16 PROCEDURE — 88305 TISSUE EXAM BY PATHOLOGIST: CPT

## 2022-03-16 PROCEDURE — 83550 IRON BINDING TEST: CPT

## 2022-03-16 PROCEDURE — 80202 ASSAY OF VANCOMYCIN: CPT

## 2022-03-16 PROCEDURE — 87040 BLOOD CULTURE FOR BACTERIA: CPT

## 2022-03-16 PROCEDURE — 99285 EMERGENCY DEPT VISIT HI MDM: CPT | Mod: 25

## 2022-03-16 PROCEDURE — 83605 ASSAY OF LACTIC ACID: CPT

## 2022-03-16 PROCEDURE — U0003: CPT

## 2022-03-16 PROCEDURE — 80053 COMPREHEN METABOLIC PANEL: CPT

## 2022-03-16 PROCEDURE — 82010 KETONE BODYS QUAN: CPT

## 2022-03-16 PROCEDURE — 85652 RBC SED RATE AUTOMATED: CPT

## 2022-03-16 PROCEDURE — 84466 ASSAY OF TRANSFERRIN: CPT

## 2022-03-16 PROCEDURE — 97161 PT EVAL LOW COMPLEX 20 MIN: CPT

## 2022-03-16 PROCEDURE — 87206 SMEAR FLUORESCENT/ACID STAI: CPT

## 2022-03-16 PROCEDURE — 84300 ASSAY OF URINE SODIUM: CPT

## 2022-03-16 PROCEDURE — 82962 GLUCOSE BLOOD TEST: CPT

## 2022-03-16 PROCEDURE — 85610 PROTHROMBIN TIME: CPT

## 2022-03-16 PROCEDURE — 81001 URINALYSIS AUTO W/SCOPE: CPT

## 2022-03-16 PROCEDURE — 85730 THROMBOPLASTIN TIME PARTIAL: CPT

## 2022-03-16 PROCEDURE — 82728 ASSAY OF FERRITIN: CPT

## 2022-03-16 PROCEDURE — 87102 FUNGUS ISOLATION CULTURE: CPT

## 2022-03-16 PROCEDURE — U0005: CPT

## 2022-03-16 PROCEDURE — 82570 ASSAY OF URINE CREATININE: CPT

## 2022-03-16 PROCEDURE — 87116 MYCOBACTERIA CULTURE: CPT

## 2022-03-16 RX ADMIN — GABAPENTIN 300 MILLIGRAM(S): 400 CAPSULE ORAL at 09:14

## 2022-03-16 RX ADMIN — CEFTRIAXONE 100 MILLIGRAM(S): 500 INJECTION, POWDER, FOR SOLUTION INTRAMUSCULAR; INTRAVENOUS at 13:55

## 2022-03-16 RX ADMIN — Medication 25 MILLIGRAM(S): at 11:43

## 2022-03-16 RX ADMIN — CHLORHEXIDINE GLUCONATE 1 APPLICATION(S): 213 SOLUTION TOPICAL at 06:11

## 2022-03-16 RX ADMIN — Medication 81 MILLIGRAM(S): at 11:43

## 2022-03-16 RX ADMIN — HEPARIN SODIUM 5000 UNIT(S): 5000 INJECTION INTRAVENOUS; SUBCUTANEOUS at 13:55

## 2022-03-16 RX ADMIN — HEPARIN SODIUM 5000 UNIT(S): 5000 INJECTION INTRAVENOUS; SUBCUTANEOUS at 06:13

## 2022-03-16 RX ADMIN — CLOPIDOGREL BISULFATE 75 MILLIGRAM(S): 75 TABLET, FILM COATED ORAL at 11:43

## 2022-03-16 RX ADMIN — GABAPENTIN 300 MILLIGRAM(S): 400 CAPSULE ORAL at 01:34

## 2022-03-16 RX ADMIN — Medication 25 MILLIGRAM(S): at 04:00

## 2022-03-16 NOTE — PROGRESS NOTE ADULT - PROVIDER SPECIALTY LIST ADULT
Internal Medicine
Podiatry
Vascular Surgery
Internal Medicine
Internal Medicine
Podiatry
Vascular Surgery
Vascular Surgery
Infectious Disease
Podiatry
Rehab Medicine
Podiatry
Hospitalist

## 2022-03-16 NOTE — PROGRESS NOTE ADULT - SUBJECTIVE AND OBJECTIVE BOX
Physical Medicine and Rehabilitation Progress Note:    Patient is a 62y old  Male who presents with a chief complaint of gangrene (14 Mar 2022 16:23)      HPI:  Patient is a 61 yo male with a hx of HTN, DM (on insulin and metformin), PVD s/p angio/stent R leg 4 days ago, known CAD s/p 3vCABG, multiple PCIs with RAJENDRA, PAD w h/o acute limb ischemia w/thrombectomy p/w R big toe discoloration and swelling. Patient was here in 11/2021 for angina and was seen by podiatry. Diagnosed with dry eschar and given 1 week follow up. Patient states that he had stent placed at [facility] in R leg on Monday (4 days ago) which he thought would improve toe, however had increasing "blackness" of toe from tip of toe down to base, malodor and throbbing since Monday.  Patient denies pain stating he has not been feeling in that toe since initial lesion formed. Patient has not been taking metformin or Lantus over past few days due to misunderstanding since stent placement, except for Lantus last night. Denies fevers, chills, fatigue, syncope, N/V/C/D. Endorses b/l tinging pain in the legs which he has had for a long time and workup has attributed to vascular disease. Reports compliance with all other medications.    In the ED,  Vitals: T 98.4 HR 75 /78 RR 18 Sa 99% RA   Labs: ESR 90 CRP 22 WBC 14.2 RBC 3.95 Hb 11.7 MCV 87.6 Plt 418 Na 134 BUN 25 Cr 1.52 Glu 249, Alb 3.2   Imaging:    CXR: WNL     XR foot: podiatry XR wet read right foot negative for soft tissue emphysema or radiographic signs of OM (osteopenia, cortical erosion, periosteal rxn)  ED Course:  s/p vanc and zosyn x1, 1L NS. podiatry consulted.    (11 Mar 2022 17:38)                            9.4    11.98 )-----------( 297      ( 16 Mar 2022 07:51 )             29.0       03-16    135  |  104  |  30<H>  ----------------------------<  69<L>  4.7   |  21<L>  |  1.67<H>    Ca    8.7      16 Mar 2022 07:51  Phos  4.5     03-16  Mg     2.2     03-16      Vital Signs Last 24 Hrs  T(C): 36.8 (16 Mar 2022 08:29), Max: 37.8 (15 Mar 2022 21:06)  T(F): 98.3 (16 Mar 2022 08:29), Max: 100 (15 Mar 2022 21:06)  HR: 84 (16 Mar 2022 08:29) (78 - 85)  BP: 156/54 (16 Mar 2022 08:29) (125/50 - 156/54)  BP(mean): 90 (15 Mar 2022 19:42) (90 - 90)  RR: 16 (16 Mar 2022 08:29) (16 - 19)  SpO2: 97% (16 Mar 2022 08:29) (97% - 98%)    MEDICATIONS  (STANDING):  amLODIPine   Tablet 10 milliGRAM(s) Oral every 24 hours  aspirin enteric coated 81 milliGRAM(s) Oral daily  atorvastatin 80 milliGRAM(s) Oral at bedtime  cefTRIAXone   IVPB 2000 milliGRAM(s) IV Intermittent every 24 hours  chlorhexidine 2% Cloths 1 Application(s) Topical <User Schedule>  clopidogrel Tablet 75 milliGRAM(s) Oral daily  dextrose 40% Gel 15 Gram(s) Oral once  dextrose 5%. 1000 milliLiter(s) (50 mL/Hr) IV Continuous <Continuous>  dextrose 5%. 1000 milliLiter(s) (100 mL/Hr) IV Continuous <Continuous>  dextrose 50% Injectable 25 Gram(s) IV Push once  dextrose 50% Injectable 12.5 Gram(s) IV Push once  dextrose 50% Injectable 25 Gram(s) IV Push once  gabapentin 300 milliGRAM(s) Oral every 8 hours  glucagon  Injectable 1 milliGRAM(s) IntraMuscular once  heparin   Injectable 5000 Unit(s) SubCutaneous every 8 hours  hydrALAZINE 25 milliGRAM(s) Oral every 8 hours  insulin glargine Injectable (LANTUS) 32 Unit(s) SubCutaneous at bedtime  insulin lispro (ADMELOG) corrective regimen sliding scale   SubCutaneous Before meals and at bedtime  metoprolol succinate ER 50 milliGRAM(s) Oral daily  polyethylene glycol 3350 17 Gram(s) Oral daily  senna 2 Tablet(s) Oral at bedtime    MEDICATIONS  (PRN):  acetaminophen     Tablet .. 650 milliGRAM(s) Oral every 6 hours PRN Mild Pain (1 - 3)  sodium chloride 0.9% lock flush 10 milliLiter(s) IV Push every 1 hour PRN Pre/post blood products, medications, blood draw, and to maintain line patency  traMADol 25 milliGRAM(s) Oral every 6 hours PRN Severe Pain (7 - 10)    Currently Undergoing Physical/ Occupational Therapy at bedside.    PT/OT Functional Status Assessment:   3/15/2022        Cognitive/Neuro/Behavioral  Cognitive/Neuro/Behavioral [WDL Definition: Alert; opens eyes spontaneously; arouses to voice or touch; oriented x 4; follows commands; speech spontaneous, logical; purposeful motor response; behavior appropriate to situation]: WDL    Language Assistance  Preferred Language to Address Healthcare Preferred Language to Address Healthcare: English    Therapeutic Interventions      Bed Mobility  Bed Mobility Training Rolling/Turning: independent  Bed Mobility Training Sit-to-Supine: independent  Bed Mobility Training Supine-to-Sit: independent    Sit-Stand Transfer Training  Transfer Training Sit-to-Stand Transfer: independent  Transfer Training Stand-to-Sit Transfer: independent    Gait Training  Gait Training: independent;  200 feet;  heel weight bearing   Gait Analysis: 2-point gait   200 feet          PM&R Impression: as above    Current Disposition Plan Recommendations:   d/c home, outpatient PT

## 2022-03-16 NOTE — DISCHARGE NOTE NURSING/CASE MANAGEMENT/SOCIAL WORK - NSDCPEFALRISK_GEN_ALL_CORE
For information on Fall & Injury Prevention, visit: [email or web address] OR  [email or web address] OR  https://www.cdc.gov/steadi/patient.html

## 2022-03-16 NOTE — PROGRESS NOTE ADULT - ASSESSMENT
per Internal Medicine    63 yo male with a hx of HTN, DM (on insulin and metformin), PVD s/p angio/stent 4 days ago, known CAD s/p 3vCABG, multiple PCIs with RAJENDRA, PAD w h/o acute limb ischemia w/thrombectomy p/w R big toe discoloration and swelling, found to have possible wet gangrene, admitted for IV antibiotic management and s/p right hallux amputation at level of 1st MPJ partially closed and packed.    Problem/Plan - 1:  ·  Problem: Type 2 diabetes mellitus with diabetic foot infection.   ·  Plan: Patient presenting with worsening discoloration and swelling of toe. Patient with known discoloration of toe since 11/2021. PMH of diabetes and PVD. XR foot wet read negative for soft tissue emphysema or radiographic signs of OM.  Patient states that he had stent placed at [facility] in R leg on 3/7/22 which he thought would improve toe, however had increasing "blackness" of toe from tip of toe down to base  - X ray rt foot: Soft tissue swelling surrounding the great toe.  - deep wound culture from rt hallux positive for Strep agalactiae    S/P right hallux amputation at level of 1st MPJ partially closed and packed.    To do:  - F/U OR deep wound culture, dirty margin and clean margin   - c/w ceftriaxone IV for Strep Agalactiae x 6 weeks  - PICC today.    Problem/Plan - 2:  ·  Problem: Wet gangrene.   ·  Plan: Patient presenting with worsening discoloration and swelling of toe. Patient with known discoloration of toe since 11/2021. PMH of diabetes and PVD.   Patient states that he had stent placed at [facility] in R leg on 3/7/22 which he thought would improve toe, however had increasing "blackness" of toe from tip of toe down to base   S/P right hallux amputation at level of 1st MPJ partially closed and packed.  - X ray rt foot: Soft tissue swelling surrounding the great toe.    To do:  - F/U OR deep wound culture, dirty margin and clean margin   - c/w ceftriaxone IV for Strep Agalactiae   - f/u Podiatry recs and ID rec.    Problem/Plan - 3:  ·  Problem: ATN (acute tubular necrosis).   ·  Plan: Patient with HILARY on possible CKD?. Creatinine on prior admission is 1.26-1.36, today 1.69.  - s/p 1L NS     To do:  - hold home losartan   - continue to monitor  - trend BUN/creatinine.    Problem/Plan - 4:  ·  Problem: HTN (hypertension).   ·  Plan: Patient hypertensive on home losartan 50 mg q daily and toprol 50 mg q daily    To do:  - cw hydral 25mg TID  - c/w home toprol 50 mg q daily  - hold home losartan 50 mg q daily given HILARY   - c/w amlodipine 10 mg q daily.    Problem/Plan - 5:  ·  Problem: CAD (coronary artery disease).   ·  Plan: Patient with extensive CAD s/p CAGB and multiple PCIs with RAJENDRA.       To do:  - c/w atorvastatin 80 mg q daily  - c/w aspirin 81 mg q daily  - c/w clopidogrel 75 mg q daily.    Problem/Plan - 6:  ·  Problem: Hyperlipidemia.   ·  Plan: Patient with h/o HLD     - c/w atorvastatin 80 mg q daily.    Problem/Plan - 7:  ·  Problem: Anemia.   ·  Plan: Patient with normocytic anemia hgb of 11.7-->10.7. Trending laterally since last admission. No sign of obvious bleed.     To do:  - continue to monitor  - transfuse Hgb <8 given cardiac history  - maintain active T&S.    Problem/Plan - 8:  ·  Problem: Peripheral artery disease.   ·  Plan: Patient with PAD/PVD s/p angio/stent 4 days ago at [facility] and history of thrombectomy.   Patient states that he had stent placed at [facility] in R leg on 3/7/22 which he thought would improve toe, however had increasing "blackness" of toe from tip of toe down to base  - vascular consulted - no acute interventions    To do:  - c/w atorvastatin 80 mg q daily  - c/w aspirin 81 mg q daily  - c/w clopidogrel 75 mg q daily.    Problem/Plan - 9:  ·  Problem: Type 2 diabetes mellitus with hyperglycemia.   ·  Plan: Patient with T2DM on metformin 1000 mg and Lantus 40 U at bedtime at home. Presenting with hyperglycemia likely in the setting of infection/inflammation vs missed Lantus/metformin doses over past few days. FSBG under control.    To do:  - c/w Lantus 32 U at bedtime (80% of home dose)   - readjust dosing as needed based on mISS requirements tomorrow.   - mISS   - f/u A1c.    Problem/Plan - 10:  ·  Problem: Nutrition, metabolism, and development symptoms.   ·  Plan; F: none  E: replete to K>4, Mg>2, Phos>2.5  N: diabetic diet  Ppx: aspirin 81   Code Status: FULL  Dispo: [facility].

## 2022-03-16 NOTE — PROGRESS NOTE ADULT - SUBJECTIVE AND OBJECTIVE BOX
Patient is a 62y old  Male who presents with a chief complaint of gangrene (14 Mar 2022 16:23)      INTERVAL HPI/ OVERNIGHT EVENTS: Pt evaluated at bedside w/ chief resident. Pt is now POD#3 s/p right hallux amp. Dressings are C/D/I, pt has no new pedal complaints at this time.       LABS                        9.4    11.98 )-----------( 297      ( 16 Mar 2022 07:51 )             29.0     03-16    135  |  104  |  30<H>  ----------------------------<  69<L>  4.7   |  21<L>  |  1.67<H>    Ca    8.7      16 Mar 2022 07:51  Phos  4.5     03-16  Mg     2.2     03-16          ICU Vital Signs Last 24 Hrs  T(C): 36.8 (16 Mar 2022 08:29), Max: 37.8 (15 Mar 2022 21:06)  T(F): 98.3 (16 Mar 2022 08:29), Max: 100 (15 Mar 2022 21:06)  HR: 84 (16 Mar 2022 08:29) (77 - 85)  BP: 156/54 (16 Mar 2022 08:29) (125/50 - 156/54)  BP(mean): 90 (15 Mar 2022 19:42) (90 - 90)  ABP: --  ABP(mean): --  RR: 16 (16 Mar 2022 08:29) (16 - 19)  SpO2: 97% (16 Mar 2022 08:29) (97% - 98%)      RADIOLOGY    MICROBIOLOGY    PHYSICAL EXAM  General: NAD, AA0x3  Lower Extremity Focused:  Vasc: non palpable pulses b/l 2/2 edema. Monophasic signals to DP &PT b/l. Erythema present to right hallux.   Derm: Necrotic tissue present to right distal hallux, erythematous borders with fibrotic edges. Negative for purulence, negative for probing or undermining. Positive for malodor. Hallux nail is partially detached at distal nail border  3/13 - pt is now s/p right hallux amputation with stitches intact and partial opening present. Sanguinous drainage. Negative for malodor, proximal streaking, or other signs of infection at this time.  Neuro: Protective sensation diminished  MSK: Pt denies tenderness to right hallux. MMT reveals 5/5 strength in all compartments.  Patient is a 62y old  Male who presents with a chief complaint of gangrene (14 Mar 2022 16:23)      INTERVAL HPI/ OVERNIGHT EVENTS: Pt evaluated at bedside w/ chief resident. Pt is now POD#3 s/p right hallux amp. Dressings are C/D/I, pt has no new pedal complaints at this time.       LABS                        9.4    11.98 )-----------( 297      ( 16 Mar 2022 07:51 )             29.0     03-16    135  |  104  |  30<H>  ----------------------------<  69<L>  4.7   |  21<L>  |  1.67<H>    Ca    8.7      16 Mar 2022 07:51  Phos  4.5     03-16  Mg     2.2     03-16          ICU Vital Signs Last 24 Hrs  T(C): 36.8 (16 Mar 2022 08:29), Max: 37.8 (15 Mar 2022 21:06)  T(F): 98.3 (16 Mar 2022 08:29), Max: 100 (15 Mar 2022 21:06)  HR: 84 (16 Mar 2022 08:29) (77 - 85)  BP: 156/54 (16 Mar 2022 08:29) (125/50 - 156/54)  BP(mean): 90 (15 Mar 2022 19:42) (90 - 90)  ABP: --  ABP(mean): --  RR: 16 (16 Mar 2022 08:29) (16 - 19)  SpO2: 97% (16 Mar 2022 08:29) (97% - 98%)      RADIOLOGY    MICROBIOLOGY    PHYSICAL EXAM  General: NAD, AA0x3  Lower Extremity Focused:  Vasc: non palpable pulses b/l 2/2 edema. Monophasic signals to DP &PT b/l. Erythema present to right hallux.   Derm: Necrotic tissue present to right distal hallux, erythematous borders with fibrotic edges. Negative for purulence, negative for probing or undermining. Positive for malodor. Hallux nail is partially detached at distal nail border  3/13 - pt is now s/p right hallux amputation with stitches intact and partial opening present. Sanguinous drainage. Negative for malodor, proximal streaking, or other signs of infection at this time.  Neuro: Protective sensation diminished  MSK: Pt denies tenderness to right hallux. MMT reveals 5/5 strength in all compartments.     MRN: [id number]  Age: 62y    Hip Internal ROM R: 40  L: 45    Hip External ROM R:  40 L:    40      Sagittal Knee Position:  Right: [x ] Normal, [ ] Flexed, [ ] Recurvatum  Left: [x ] Normal, [ ] Flexed, [ ] Recurvatum    Frontal Plane Leg:  Right: [x ] Normal, [ ] Varum, [ ] Valgum  Left: [x ] Normal, [ ] Varum, [ ] Valgum    Malleolar Position:  Right: [x ] External, [ ] Internal  Left: [ x] External, [ ] Internal    Limb Length Discrepancy  Right: [ ] Longer, [ ] Plaza  _0__ cm  Left: [ ] Longer, [ ] Plaza  _0__ cm    Ankle, knee extended  Right: [ x] Normal, [ ] Limited, [ ] Crepitus  Left: [ x] Normal, [ ] Limited, [ ] Crepitus    Ankle, knee flexed  Right: [x ] Normal, [ ] Limited, [ ] Crepitus  Left: [ x] Normal, [ ] Limited, [ ] Crepitus    RCSP  Right: [ ] Vertical, [ ] Varus, [x ] Valgus  Left: [ ] Vertical, [ ] Varus, [ x] Valgus    NCSP:  Right: [ ] Vertical, [ ] Varus, [ x] Valgus  Left: [ ] Vertical, [ ] Varus, [x ] Valgus    STJ ROM:  Right: [x ] Normal, [ ] Limited, [ ] Crepitus  Left: [x ] Normal, [ ] Limited, [ ] Crepitus    MTJ ROM:  Right: [ ] Normal, [x ] Limited, [ ] Crepitus  Left: [ ] Normal, [x ] Limited, [ ] Crepitus    FF to RF Relationship  Right: [x ] Normal, [ ] Varus, [ ] Valgus  Left: [x ] Normal, [ ] Varus, [ ] Valgus    1st Ray Position  Right: [x ] Neutral, [ ] Dorsiflexed, [ ] Plantarflexed, [ ] Hypermobile  Left: [x ] Neutral, [ ] Dorsiflexed, [ ] Plantarflexed, [ ] Hypermobile    1st MTP ROM, loaded  Right: [ ] Normal, [x ] Limited, [ ] Crepitus  Left: [ ] Normal, [x ] Limited, [ ] Crepitus    1st MTP ROM, unloaded  Right: [x ] Normal, [ ] Limited, [ ] Crepitus  Left: [x ] Normal, [ ] Limited, [ ] Crepitus    Muscle Strength  Posterior Group  R: [x ] 5, [ ] 4, [ ] 3, [ ] 2, [ ] 1  L: [x ] 5, [ ] 4, [ ] 3, [ ] 2, [ ] 1  Anterior Group  R: [ x] 5, [ ] 4, [ ] 3, [ ] 2, [ ] 1  L: [x ] 5, [ ] 4, [ ] 3, [ ] 2, [ ] 1  Medial Group  R: [x ] 5, [ ] 4, [ ] 3, [ ] 2, [ ] 1  L: [x ] 5, [ ] 4, [ ] 3, [ ] 2, [ ] 1  Lateral Group  R: [x ] 5, [ ] 4, [ ] 3, [ ] 2, [ ] 1  L: [x ] 5, [ ] 4, [ ] 3, [ ] 2, [ ] 1    Gait Examination: Neutral gait & stance  Treatment: Per plan

## 2022-03-16 NOTE — DISCHARGE NOTE NURSING/CASE MANAGEMENT/SOCIAL WORK - PATIENT PORTAL LINK FT
You can access the FollowMyHealth Patient Portal offered by [facility] by registering at the following website: [email or web address]. By joining [employer]’s FollowMyHealth portal, you will also be able to view your health information using other applications (apps) compatible with our system.

## 2022-03-16 NOTE — PROGRESS NOTE ADULT - ASSESSMENT
61 yo male with a hx of DM, PVD s/p angio/stent 4 days ago (at [facility]) p/w R big toe discoloration and swelling.  Podiatry consulted to evaluate for wet gangrene. Of note, pt is afebrile, VSS, with  WBC dowtrending. Clinical image of right hallux highly suspicious of wet gangrene. Pt now S/P right hallux amputation at level of 1st MPJ partially closed and packed (DOS 3/13/22).    Plan:  - ABX per ID  - F/U OR deep wound culture, dirty margin and clean margin - deep wound swab growing GBS  - heel WBAT to RLE in surgical shoe  - Local wound care: surgical site irrigated, dressed DSD, Kerlix and ACE  - rest of care per primary team    Podiatry following. Plan d/w attending.    Dressings: Keep dressings Clean, Dry, and intact. Do not remove until 1st post operative follow up appointment. Heel weight bearing in surgical shoe.     Patient should follow up with Dr. Henrry Tamez within 1 week of discharge.    Office information:          [place] Address- [address] [address] Phone: [phone]         [place] Address- [address], [place] [address] Phone: [phone]

## 2022-03-17 LAB
CULTURE RESULTS: SIGNIFICANT CHANGE UP
ORGANISM # SPEC MICROSCOPIC CNT: SIGNIFICANT CHANGE UP
ORGANISM # SPEC MICROSCOPIC CNT: SIGNIFICANT CHANGE UP
SPECIMEN SOURCE: SIGNIFICANT CHANGE UP

## 2022-03-18 DIAGNOSIS — I10 ESSENTIAL (PRIMARY) HYPERTENSION: ICD-10-CM

## 2022-03-18 DIAGNOSIS — E11.621 TYPE 2 DIABETES MELLITUS WITH FOOT ULCER: ICD-10-CM

## 2022-03-18 DIAGNOSIS — Z79.84 LONG TERM (CURRENT) USE OF ORAL HYPOGLYCEMIC DRUGS: ICD-10-CM

## 2022-03-18 DIAGNOSIS — Z79.82 LONG TERM (CURRENT) USE OF ASPIRIN: ICD-10-CM

## 2022-03-18 DIAGNOSIS — Z79.4 LONG TERM (CURRENT) USE OF INSULIN: ICD-10-CM

## 2022-03-18 DIAGNOSIS — Z79.01 LONG TERM (CURRENT) USE OF ANTICOAGULANTS: ICD-10-CM

## 2022-03-18 DIAGNOSIS — E11.52 TYPE 2 DIABETES MELLITUS WITH DIABETIC PERIPHERAL ANGIOPATHY WITH GANGRENE: ICD-10-CM

## 2022-03-18 DIAGNOSIS — E11.65 TYPE 2 DIABETES MELLITUS WITH HYPERGLYCEMIA: ICD-10-CM

## 2022-03-18 DIAGNOSIS — E78.5 HYPERLIPIDEMIA, UNSPECIFIED: ICD-10-CM

## 2022-03-18 DIAGNOSIS — N17.0 ACUTE KIDNEY FAILURE WITH TUBULAR NECROSIS: ICD-10-CM

## 2022-03-18 DIAGNOSIS — M15.9 POLYOSTEOARTHRITIS, UNSPECIFIED: ICD-10-CM

## 2022-03-18 DIAGNOSIS — L97.518 NON-PRESSURE CHRONIC ULCER OF OTHER PART OF RIGHT FOOT WITH OTHER SPECIFIED SEVERITY: ICD-10-CM

## 2022-03-18 DIAGNOSIS — K21.9 GASTRO-ESOPHAGEAL REFLUX DISEASE WITHOUT ESOPHAGITIS: ICD-10-CM

## 2022-03-18 DIAGNOSIS — B95.1 STREPTOCOCCUS, GROUP B, AS THE CAUSE OF DISEASES CLASSIFIED ELSEWHERE: ICD-10-CM

## 2022-03-18 DIAGNOSIS — M86.9 OSTEOMYELITIS, UNSPECIFIED: ICD-10-CM

## 2022-03-18 DIAGNOSIS — Z95.5 PRESENCE OF CORONARY ANGIOPLASTY IMPLANT AND GRAFT: ICD-10-CM

## 2022-03-18 DIAGNOSIS — D64.9 ANEMIA, UNSPECIFIED: ICD-10-CM

## 2022-03-18 DIAGNOSIS — I25.10 ATHEROSCLEROTIC HEART DISEASE OF NATIVE CORONARY ARTERY WITHOUT ANGINA PECTORIS: ICD-10-CM

## 2022-03-18 DIAGNOSIS — Z91.041 RADIOGRAPHIC DYE ALLERGY STATUS: ICD-10-CM

## 2022-03-18 DIAGNOSIS — Z95.1 PRESENCE OF AORTOCORONARY BYPASS GRAFT: ICD-10-CM

## 2022-03-18 DIAGNOSIS — Z87.891 PERSONAL HISTORY OF NICOTINE DEPENDENCE: ICD-10-CM

## 2022-03-18 DIAGNOSIS — E11.69 TYPE 2 DIABETES MELLITUS WITH OTHER SPECIFIED COMPLICATION: ICD-10-CM

## 2022-03-18 DIAGNOSIS — E11.40 TYPE 2 DIABETES MELLITUS WITH DIABETIC NEUROPATHY, UNSPECIFIED: ICD-10-CM

## 2022-03-18 DIAGNOSIS — I70.261 ATHEROSCLEROSIS OF NATIVE ARTERIES OF EXTREMITIES WITH GANGRENE, RIGHT LEG: ICD-10-CM

## 2022-03-18 LAB
CULTURE RESULTS: NO GROWTH — SIGNIFICANT CHANGE UP
SPECIMEN SOURCE: SIGNIFICANT CHANGE UP

## 2022-03-23 ENCOUNTER — NON-APPOINTMENT (OUTPATIENT)
Age: 62
End: 2022-03-23

## 2022-03-24 LAB — SURGICAL PATHOLOGY STUDY: SIGNIFICANT CHANGE UP

## 2022-03-28 ENCOUNTER — APPOINTMENT (OUTPATIENT)
Dept: VASCULAR SURGERY | Facility: CLINIC | Age: 62
End: 2022-03-28
Payer: COMMERCIAL

## 2022-03-28 VITALS
WEIGHT: 158 LBS | BODY MASS INDEX: 26.98 KG/M2 | HEIGHT: 64 IN | HEART RATE: 66 BPM | SYSTOLIC BLOOD PRESSURE: 188 MMHG | DIASTOLIC BLOOD PRESSURE: 62 MMHG

## 2022-03-28 PROCEDURE — 99213 OFFICE O/P EST LOW 20 MIN: CPT

## 2022-03-28 PROCEDURE — 93926 LOWER EXTREMITY STUDY: CPT

## 2022-03-30 NOTE — DISCUSSION/SUMMARY
[FreeTextEntry1] : 61 yo male with a hx of HTN, DM (on insulin and metformin), PVD s/p angio/stent 4 days ago, known CAD s/p 3vCABG, multiple PCIs with RAJENDRA, PAD w h/o acute limb ischemia w/R femoral artery exploration,thrombectomy, patch repair, completion angiogram on 6/16/18 who was recently admitted with R big toe discoloration and  swelling, found to have possible wet gangrene, admitted for IV antibiotic management and s/p right hallux amputation at level of 1st MPJ by Dr. Tamez. Patient is on IV Ceftriaxone for 6 weeks till 4/24 for Strep Agalactiae. Prior to this admission patient was seen by another vascular surgeon, Dr. Diane at [facility] who performed RLE Angio/ stent, patient is not sure which artery. He is doing well, his amputation site is healing. He states that he is waiting for his PCP to prove a referral to see another podiatrist since Dr. Tamez is no longer participates with his insurance. He denies fever, chills, pain.\par RLE: well healing great toe amputation site, sutures in place, no signs of infection.\par Arterial duplex was done in the office demonstrating good flow throughout, patent pop artery stent.\par JASMYN/PVRs were reviewed (3/24/22 at another facility) demonstrating RLE 1.08, LLE 0.91.\par Patient was recommended to c/w local wound care and f/u with a podiatrist for R great toe amputation.\par F/u here in 6 months or sooner if needed.\par

## 2022-03-30 NOTE — ADDENDUM
[FreeTextEntry1] : I, Dr. [place] Martínez, personally performed the evaluation and management (E/M) services for this established patient who presents today with (a) new problem(s)/exacerbation of (an) existing condition(s).  That E/M includes conducting the examination, assessing all new/exacerbated conditions, and establishing a new plan of care.  Today, my ACP, Shoshana HONG, was here to observe my evaluation and management services for this new problem/exacerbated condition to be followed going forward.\par \par \par

## 2022-03-30 NOTE — REASON FOR VISIT
[de-identified] : Amputation of right hallux [de-identified] : 3/13/22 [de-identified] : 61 yo male with a hx of HTN, DM (on insulin and metformin), PVD s/p angio/stent 4 days ago, known CAD s/p 3vCABG, multiple PCIs with RAJENDRA, PAD w h/o acute limb ischemia w/R femoral artery exploration,thrombectomy, patch repair, completion angiogram on 6/16/18 who was recently admitted with R big toe discoloration and  swelling, found to have possible wet gangrene, admitted for IV antibiotic management and s/p right hallux amputation at level of 1st MPJ by Dr. Tamez. Patient is on IV Ceftriaxone for 6 weeks till 4/24 for Strep Agalactiae. Prior to this admission patient was seen by another vascular surgeon, Dr. Diane at [facility] who performed RLE Angio/ stent, patient is not sure which artery. He is doing well, his amputation site is healing. He states that he is waiting for his PCP to prove a referral to see another podiatrist since Dr. Tamez is no longer participates with his insurance. He denies fever, chills, pain.

## 2022-03-30 NOTE — PHYSICAL EXAM
[2+] : right 2+ [1+] : right 1+ [Calm] : calm [Ankle Swelling (On Exam)] : not present [Varicose Veins Of Lower Extremities] : not present [Abdomen Tenderness] : ~T ~M No abdominal tenderness [de-identified] : WN/WD, NAD [de-identified] : NC/AT [de-identified] : supple [de-identified] : +FROm 5/5x4 [de-identified] : RLE: well healing great toe amputation site, sutures in place, no signs of infection [de-identified] : grossly intact

## 2022-04-13 LAB
CULTURE RESULTS: SIGNIFICANT CHANGE UP
CULTURE RESULTS: SIGNIFICANT CHANGE UP
SPECIMEN SOURCE: SIGNIFICANT CHANGE UP
SPECIMEN SOURCE: SIGNIFICANT CHANGE UP

## 2022-04-21 ENCOUNTER — NON-APPOINTMENT (OUTPATIENT)
Age: 62
End: 2022-04-21

## 2022-05-16 ENCOUNTER — APPOINTMENT (OUTPATIENT)
Dept: INFECTIOUS DISEASE | Facility: CLINIC | Age: 62
End: 2022-05-16
Payer: COMMERCIAL

## 2022-05-16 VITALS
DIASTOLIC BLOOD PRESSURE: 62 MMHG | OXYGEN SATURATION: 98 % | HEART RATE: 64 BPM | SYSTOLIC BLOOD PRESSURE: 120 MMHG | TEMPERATURE: 97.8 F | BODY MASS INDEX: 26.67 KG/M2 | HEIGHT: 64 IN | WEIGHT: 156.25 LBS

## 2022-05-16 DIAGNOSIS — M86.9 OSTEOMYELITIS, UNSPECIFIED: ICD-10-CM

## 2022-05-16 PROCEDURE — 99213 OFFICE O/P EST LOW 20 MIN: CPT

## 2022-05-16 RX ORDER — LOSARTAN POTASSIUM 50 MG/1
50 TABLET, FILM COATED ORAL
Refills: 0 | Status: COMPLETED | COMMUNITY
End: 2022-05-16

## 2022-05-16 RX ORDER — POLYETHYLENE GLYCOL 3350 17 G/17G
17 POWDER, FOR SOLUTION ORAL DAILY
Qty: 30 | Refills: 3 | Status: COMPLETED | COMMUNITY
Start: 2019-07-30 | End: 2022-05-16

## 2022-05-16 RX ORDER — METFORMIN HYDROCHLORIDE 1000 MG/1
1000 TABLET, COATED ORAL
Refills: 0 | Status: COMPLETED | COMMUNITY
End: 2022-05-16

## 2022-05-16 NOTE — HISTORY OF PRESENT ILLNESS
[FreeTextEntry1] : 62 year old male with HTN, DM (on insulin and metformin), PVD s/p angio/stent R leg, known CAD s/p 3vCABG, multiple PCIs with RAJENDRA, PAD w h/o acute limb ischemia w/thrombectomy. He was admitted to [facility] 3/11 - 3/16 R big toe discoloration and swelling, found to have OM. He underwent right hallux amputation at level of 1st MPJ on 3/13. OR cultures grew Group B strep. He was treated with ceftriaxone x 6 weeks (3/13 - 4/25). He's been following up with podiatry. No pain, redness, drainage, swelling.

## 2022-05-16 NOTE — PHYSICAL EXAM
[General Appearance - Alert] : alert [Sclera] : the sclera and conjunctiva were normal [Outer Ear] : the ears and nose were normal in appearance [Neck Appearance] : the appearance of the neck was normal [Heart Rate And Rhythm] : heart rate was normal and rhythm regular [Edema] : there was no peripheral edema [Bowel Sounds] : normal bowel sounds [No Palpable Adenopathy] : no palpable adenopathy [] : no rash [Motor Exam] : the motor exam was normal [Oriented To Time, Place, And Person] : oriented to person, place, and time [FreeTextEntry1] : s/p right first toe amputation; no discharge, erythema

## 2022-05-17 ENCOUNTER — NON-APPOINTMENT (OUTPATIENT)
Age: 62
End: 2022-05-17

## 2022-05-17 LAB
ALBUMIN SERPL ELPH-MCNC: 3.8 G/DL
ALP BLD-CCNC: 102 U/L
ALT SERPL-CCNC: 21 U/L
ANION GAP SERPL CALC-SCNC: 12 MMOL/L
AST SERPL-CCNC: 19 U/L
BASOPHILS # BLD AUTO: 0.09 K/UL
BASOPHILS NFR BLD AUTO: 0.9 %
BILIRUB SERPL-MCNC: <0.2 MG/DL
BUN SERPL-MCNC: 29 MG/DL
CALCIUM SERPL-MCNC: 8.7 MG/DL
CHLORIDE SERPL-SCNC: 111 MMOL/L
CO2 SERPL-SCNC: 19 MMOL/L
CREAT SERPL-MCNC: 1.65 MG/DL
CRP SERPL-MCNC: <3 MG/L
EGFR: 46 ML/MIN/1.73M2
EOSINOPHIL # BLD AUTO: 0.43 K/UL
EOSINOPHIL NFR BLD AUTO: 4.5 %
ERYTHROCYTE [SEDIMENTATION RATE] IN BLOOD BY WESTERGREN METHOD: 47 MM/HR
GLUCOSE SERPL-MCNC: 88 MG/DL
HCT VFR BLD CALC: 31.8 %
HGB BLD-MCNC: 10.1 G/DL
IMM GRANULOCYTES NFR BLD AUTO: 0.3 %
LYMPHOCYTES # BLD AUTO: 1.71 K/UL
LYMPHOCYTES NFR BLD AUTO: 17.8 %
MAN DIFF?: NORMAL
MCHC RBC-ENTMCNC: 28.6 PG
MCHC RBC-ENTMCNC: 31.8 GM/DL
MCV RBC AUTO: 90.1 FL
MONOCYTES # BLD AUTO: 0.92 K/UL
MONOCYTES NFR BLD AUTO: 9.6 %
NEUTROPHILS # BLD AUTO: 6.44 K/UL
NEUTROPHILS NFR BLD AUTO: 66.9 %
PLATELET # BLD AUTO: 327 K/UL
POTASSIUM SERPL-SCNC: 5.3 MMOL/L
PROT SERPL-MCNC: 6.3 G/DL
RBC # BLD: 3.53 M/UL
RBC # FLD: 13.2 %
SODIUM SERPL-SCNC: 142 MMOL/L
WBC # FLD AUTO: 9.62 K/UL

## 2022-06-02 ENCOUNTER — RESULT REVIEW (OUTPATIENT)
Age: 62
End: 2022-06-02

## 2022-06-02 ENCOUNTER — APPOINTMENT (OUTPATIENT)
Age: 62
End: 2022-06-02
Payer: COMMERCIAL

## 2022-06-02 PROCEDURE — 45380 COLONOSCOPY AND BIOPSY: CPT

## 2022-07-11 ENCOUNTER — APPOINTMENT (OUTPATIENT)
Dept: INFECTIOUS DISEASE | Facility: CLINIC | Age: 62
End: 2022-07-11

## 2022-08-11 ENCOUNTER — APPOINTMENT (OUTPATIENT)
Dept: HEART AND VASCULAR | Facility: CLINIC | Age: 62
End: 2022-08-11

## 2022-09-09 NOTE — HISTORY OF PRESENT ILLNESS
[FreeTextEntry1] : 63 yo male with a hx of HTN, DM (on insulin and metformin), PVD s/p angio/stent at [facility], known CAD s/p 3vCABG, multiple PCIs with RAJENDRA, PAD w h/o acute limb ischemia w/R femoral artery exploration,thrombectomy, patch repair, completion angiogram on 6/16/18 who was admitted in March, 2022 with R big toe discoloration and swelling, found to have possible wet gangrene, s/p right hallux amputation at level of 1st MPJ by Dr. Tamez.  He is doing well, his amputation site healed.

## 2022-09-09 NOTE — ASSESSMENT
[FreeTextEntry1] : 61 yo male with a hx of HTN, DM (on insulin and metformin), PVD s/p angio/stent at [facility], known CAD s/p 3vCABG, multiple PCIs with RAJENDRA, PAD w h/o acute limb ischemia w/R femoral artery exploration,thrombectomy, patch repair, completion angiogram on 6/16/18 who was admitted in March, 2022 with R big toe discoloration and swelling, found to have possible wet gangrene, s/p right hallux amputation at level of 1st MPJ by Dr. Tamez.  He is doing well, his amputation site healed [Arterial/Venous Disease] : arterial/venous disease [Foot care/Footwear] : foot care/footwear [Ulcer Care] : ulcer care

## 2022-09-09 NOTE — DISCUSSION/SUMMARY
[FreeTextEntry1] : 61 yo male with a hx of HTN, DM (on insulin and metformin), PVD s/p angio/stent 4 days ago, known CAD s/p 3vCABG, multiple PCIs with RAJENDRA, PAD w h/o acute limb ischemia w/R femoral artery exploration,thrombectomy, patch repair, completion angiogram on 6/16/18 who was recently admitted with R big toe discoloration and  swelling, found to have possible wet gangrene, admitted for IV antibiotic management and s/p right hallux amputation at level of 1st MPJ by Dr. Tamez. Patient is on IV Ceftriaxone for 6 weeks till 4/24 for Strep Agalactiae. Prior to this admission patient was seen by another vascular surgeon, Dr. Diane at [facility] who performed RLE Angio/ stent, patient is not sure which artery. He is doing well, his amputation site is healing. He states that he is waiting for his PCP to prove a referral to see another podiatrist since Dr. Tamez is no longer participates with his insurance. He denies fever, chills, pain.\par RLE: well healing great toe amputation site, sutures in place, no signs of infection.\par Arterial duplex was done in the office demonstrating good flow throughout, patent pop artery stent.\par JASMYN/PVRs were reviewed (3/24/22 at another facility) demonstrating RLE 1.08, LLE 0.91.\par Patient was recommended to c/w local wound care and f/u with a podiatrist for R great toe amputation.\par F/u here in 6 months or sooner if needed.\par

## 2022-09-09 NOTE — PHYSICAL EXAM
[2+] : right 2+ [1+] : right 1+ [Ankle Swelling (On Exam)] : not present [Varicose Veins Of Lower Extremities] : not present [Abdomen Tenderness] : ~T ~M No abdominal tenderness [Calm] : calm [de-identified] : WN/WD, NAD [de-identified] : NC/AT [de-identified] : supple [de-identified] : +FROm 5/5x4 [de-identified] : RLE: well healing great toe amputation site, sutures in place, no signs of infection [de-identified] : grossly intact

## 2022-09-09 NOTE — ADDENDUM
[FreeTextEntry1] : I, Dr. South Martínez, personally performed the evaluation and management (E/M) services for this established patient who presents today with (an) existing condition(s).  That E/M includes conducting the examination, assessing all conditions, and (re)establishing/reinforcing a plan of care.  Today, my ACP, Shoshana HONG, was here to observe my evaluation and management services for this condition to be followed going forward.\par \par \par

## 2022-09-19 ENCOUNTER — APPOINTMENT (OUTPATIENT)
Dept: VASCULAR SURGERY | Facility: CLINIC | Age: 62
End: 2022-09-19

## 2022-09-19 ENCOUNTER — NON-APPOINTMENT (OUTPATIENT)
Age: 62
End: 2022-09-19

## 2022-10-14 NOTE — DISCHARGE NOTE PROVIDER - PROVIDER RX CONTACT NUMBER
Assessment: Patient is a 62 year old male with a PMHx of HTN, DM2, HLD, BPH, PAD and right BKA secondary to unhealed DM2 ulcer who presented to [facility] for left lower extremity peripheral angiogram.  Patient is now S/P left lower extremity BKA on 10/7/22.      PLAN:  - Pain control PRN   - NPO for OR   - Cardiology clearance  - VTE prophylaxis with Heparin subcutaneous  ***OR Today         Vascular Surgery, C Team Surgery  P# [phone]

## 2022-10-21 NOTE — DISCHARGE NOTE ADULT - MEDICATION SUMMARY - MEDICATIONS TO STOP TAKING
Pt educated on handwashing and infection control. Pt verbalized understanding;      I will STOP taking the medications listed below when I get home from the hospital:  None

## 2023-02-28 NOTE — H&P ADULT - NSTOBACCOSCREENHP_GEN_A_NCS
Patient calling again asking to speak to nurse. She has to go out and is asking for call after 2   No

## 2023-03-13 NOTE — DISCHARGE NOTE ADULT - NS DC STATIN PRESCRIBED YN
yes Low Dose Naltrexone Pregnancy And Lactation Text: Naltrexone is pregnancy category C.  There have been no adequate and well-controlled studies in pregnant women.  It should be used in pregnancy only if the potential benefit justifies the potential risk to the fetus.   Limited data indicates that naltrexone is minimally excreted into breastmilk.

## 2023-03-29 NOTE — PATIENT PROFILE ADULT - FUNCTIONAL ASSESSMENT - DAILY ACTIVITY 1.
Lab order faxed to [place] for vori level to be drawn along with routine standing labs on Friday April 7th.   
4 = No assist / stand by assistance

## 2023-12-18 NOTE — ED ADULT NURSE NOTE - CHIEF COMPLAINT QUOTE
We will plan for an EGD (upper endoscopy, esophagogastroduodenoscopy) with EUS (endoscopic ultrasound) as soon as our schedule allows, to evaluate the abnormality on the pancreas noted on your CT scan.   
pt having upper abdominal pain x 1 week with nausea

## 2024-01-16 NOTE — ED PROVIDER NOTE - CPE EDP CARDIAC NORM
normal...

## 2024-03-12 NOTE — BRIEF OPERATIVE NOTE - TYPE OF ANESTHESIA
MAC Quality 226: Preventive Care And Screening: Tobacco Use: Screening And Cessation Intervention: Patient screened for tobacco use and is an ex/non-smoker Quality 130: Documentation Of Current Medications In The Medical Record: Current Medications Documented Detail Level: Detailed

## 2024-04-29 NOTE — DISCHARGE NOTE PROVIDER - CARE PROVIDERS DIRECT ADDRESSES
[email or web address]

## 2024-11-04 NOTE — ED PROVIDER NOTE - NS ED MD TWO NIGHTS YN
CONSTITUTIONAL: No fever, no chills, no fatigue  EYES: No visual changes  ENT: No ear pain, no sore throat  CARDIOVASCULAR: No chest pain, no palpitations  RESPIRATORY: No cough, no SOB  GI: no constipation.   GENITOURINARY: No dysuria, no frequency, no hematuria  MUSKULOSKELETAL: No backpain, no joint pain, no myalgias  SKIN: No rash  NEURO: No headache    ALL OTHER SYSTEMS NEGATIVE.
Yes

## 2025-04-28 NOTE — H&P ADULT - NSHPPOAPULMEMBOLUS_GEN_A_CORE
Glasses Prescription given to patient. Monitor. Stable. cataracts are visually significant, however patient declines surgical consult at this time. suspected 124

## (undated) DEVICE — WARMING BLANKET UPPER ADULT

## (undated) DEVICE — PACK ORTHO FOOT ANKLE

## (undated) DEVICE — VENODYNE/SCD SLEEVE CALF MEDIUM

## (undated) DEVICE — GLV 7.5 PROTEXIS (WHITE)